# Patient Record
Sex: MALE | Race: BLACK OR AFRICAN AMERICAN | Employment: FULL TIME | URBAN - METROPOLITAN AREA
[De-identification: names, ages, dates, MRNs, and addresses within clinical notes are randomized per-mention and may not be internally consistent; named-entity substitution may affect disease eponyms.]

---

## 2018-09-20 ENCOUNTER — ANESTHESIA EVENT (OUTPATIENT)
Dept: SURGERY | Age: 48
End: 2018-09-20
Payer: COMMERCIAL

## 2018-09-21 ENCOUNTER — ANESTHESIA (OUTPATIENT)
Dept: SURGERY | Age: 48
End: 2018-09-21
Payer: COMMERCIAL

## 2018-09-21 ENCOUNTER — HOSPITAL ENCOUNTER (OUTPATIENT)
Age: 48
Setting detail: OUTPATIENT SURGERY
Discharge: HOME | End: 2018-09-21
Attending: ORTHOPAEDIC SURGERY | Admitting: ORTHOPAEDIC SURGERY
Payer: COMMERCIAL

## 2018-09-21 VITALS
BODY MASS INDEX: 34.59 KG/M2 | SYSTOLIC BLOOD PRESSURE: 119 MMHG | DIASTOLIC BLOOD PRESSURE: 65 MMHG | RESPIRATION RATE: 14 BRPM | OXYGEN SATURATION: 93 % | HEART RATE: 68 BPM | WEIGHT: 248 LBS | TEMPERATURE: 97.6 F

## 2018-09-21 DIAGNOSIS — L76.82 PAIN AT SURGICAL INCISION: Primary | ICD-10-CM

## 2018-09-21 LAB — POTASSIUM BLD-SCNC: 3.4 MMOL/L (ref 3.5–5.1)

## 2018-09-21 PROCEDURE — 77030027384 HC PRB ARTHSCP SERFAS STRY -C: Performed by: ORTHOPAEDIC SURGERY

## 2018-09-21 PROCEDURE — 77030006868 HC BLD SHV AUG STRY -B: Performed by: ORTHOPAEDIC SURGERY

## 2018-09-21 PROCEDURE — 76060000033 HC ANESTHESIA 1 TO 1.5 HR: Performed by: ORTHOPAEDIC SURGERY

## 2018-09-21 PROCEDURE — 77030035253 HC BIT DRL ICONIX DISP STRY -B: Performed by: ORTHOPAEDIC SURGERY

## 2018-09-21 PROCEDURE — 77030019908 HC STETH ESOPH SIMS -A: Performed by: ANESTHESIOLOGY

## 2018-09-21 PROCEDURE — 74011250636 HC RX REV CODE- 250/636: Performed by: ORTHOPAEDIC SURGERY

## 2018-09-21 PROCEDURE — 77030020143 HC AIRWY LARYN INTUB CGAS -A: Performed by: ANESTHESIOLOGY

## 2018-09-21 PROCEDURE — 77030006891 HC BLD SHV RESECT STRY -B: Performed by: ORTHOPAEDIC SURGERY

## 2018-09-21 PROCEDURE — 77030003666 HC NDL SPINAL BD -A: Performed by: ORTHOPAEDIC SURGERY

## 2018-09-21 PROCEDURE — 77030002916 HC SUT ETHLN J&J -A: Performed by: ORTHOPAEDIC SURGERY

## 2018-09-21 PROCEDURE — 74011000250 HC RX REV CODE- 250

## 2018-09-21 PROCEDURE — 76942 ECHO GUIDE FOR BIOPSY: CPT | Performed by: ORTHOPAEDIC SURGERY

## 2018-09-21 PROCEDURE — 77030032490 HC SLV COMPR SCD KNE COVD -B: Performed by: ORTHOPAEDIC SURGERY

## 2018-09-21 PROCEDURE — 74011250636 HC RX REV CODE- 250/636

## 2018-09-21 PROCEDURE — 77030033073 HC TBNG ARTHSC PMP OUTFLO STRY -B: Performed by: ORTHOPAEDIC SURGERY

## 2018-09-21 PROCEDURE — 76010000161 HC OR TIME 1 TO 1.5 HR INTENSV-TIER 1: Performed by: ORTHOPAEDIC SURGERY

## 2018-09-21 PROCEDURE — 77030018836 HC SOL IRR NACL ICUM -A: Performed by: ORTHOPAEDIC SURGERY

## 2018-09-21 PROCEDURE — 76210000006 HC OR PH I REC 0.5 TO 1 HR: Performed by: ORTHOPAEDIC SURGERY

## 2018-09-21 PROCEDURE — 74011250636 HC RX REV CODE- 250/636: Performed by: ANESTHESIOLOGY

## 2018-09-21 PROCEDURE — 76210000020 HC REC RM PH II FIRST 0.5 HR: Performed by: ORTHOPAEDIC SURGERY

## 2018-09-21 PROCEDURE — 77030003602 HC NDL NRV BLK BBMI -B: Performed by: ANESTHESIOLOGY

## 2018-09-21 PROCEDURE — 76010010054 HC POST OP PAIN BLOCK: Performed by: ORTHOPAEDIC SURGERY

## 2018-09-21 PROCEDURE — 84132 ASSAY OF SERUM POTASSIUM: CPT

## 2018-09-21 PROCEDURE — 77030033005 HC TBNG ARTHSC PMP STRY -B: Performed by: ORTHOPAEDIC SURGERY

## 2018-09-21 RX ORDER — EPINEPHRINE 1 MG/ML
INJECTION, SOLUTION, CONCENTRATE INTRAVENOUS AS NEEDED
Status: DISCONTINUED | OUTPATIENT
Start: 2018-09-21 | End: 2018-09-21 | Stop reason: HOSPADM

## 2018-09-21 RX ORDER — MIDAZOLAM HYDROCHLORIDE 1 MG/ML
2 INJECTION, SOLUTION INTRAMUSCULAR; INTRAVENOUS ONCE
Status: COMPLETED | OUTPATIENT
Start: 2018-09-21 | End: 2018-09-21

## 2018-09-21 RX ORDER — GLYCOPYRROLATE 0.2 MG/ML
INJECTION INTRAMUSCULAR; INTRAVENOUS AS NEEDED
Status: DISCONTINUED | OUTPATIENT
Start: 2018-09-21 | End: 2018-09-21 | Stop reason: HOSPADM

## 2018-09-21 RX ORDER — OXYCODONE HYDROCHLORIDE 5 MG/1
5 TABLET ORAL
Status: DISCONTINUED | OUTPATIENT
Start: 2018-09-21 | End: 2018-09-21 | Stop reason: HOSPADM

## 2018-09-21 RX ORDER — FLUMAZENIL 0.1 MG/ML
0.2 INJECTION INTRAVENOUS AS NEEDED
Status: DISCONTINUED | OUTPATIENT
Start: 2018-09-21 | End: 2018-09-21 | Stop reason: HOSPADM

## 2018-09-21 RX ORDER — FENTANYL CITRATE 50 UG/ML
100 INJECTION, SOLUTION INTRAMUSCULAR; INTRAVENOUS ONCE
Status: COMPLETED | OUTPATIENT
Start: 2018-09-21 | End: 2018-09-21

## 2018-09-21 RX ORDER — ONDANSETRON 2 MG/ML
INJECTION INTRAMUSCULAR; INTRAVENOUS AS NEEDED
Status: DISCONTINUED | OUTPATIENT
Start: 2018-09-21 | End: 2018-09-21 | Stop reason: HOSPADM

## 2018-09-21 RX ORDER — MIDAZOLAM HYDROCHLORIDE 1 MG/ML
2 INJECTION, SOLUTION INTRAMUSCULAR; INTRAVENOUS
Status: DISCONTINUED | OUTPATIENT
Start: 2018-09-21 | End: 2018-09-21 | Stop reason: HOSPADM

## 2018-09-21 RX ORDER — OXYCODONE HYDROCHLORIDE 5 MG/1
10 TABLET ORAL
Status: DISCONTINUED | OUTPATIENT
Start: 2018-09-21 | End: 2018-09-21 | Stop reason: HOSPADM

## 2018-09-21 RX ORDER — SODIUM CHLORIDE, SODIUM LACTATE, POTASSIUM CHLORIDE, CALCIUM CHLORIDE 600; 310; 30; 20 MG/100ML; MG/100ML; MG/100ML; MG/100ML
75 INJECTION, SOLUTION INTRAVENOUS CONTINUOUS
Status: DISCONTINUED | OUTPATIENT
Start: 2018-09-21 | End: 2018-09-21 | Stop reason: HOSPADM

## 2018-09-21 RX ORDER — PROPOFOL 10 MG/ML
INJECTION, EMULSION INTRAVENOUS AS NEEDED
Status: DISCONTINUED | OUTPATIENT
Start: 2018-09-21 | End: 2018-09-21 | Stop reason: HOSPADM

## 2018-09-21 RX ORDER — OXYCODONE AND ACETAMINOPHEN 5; 325 MG/1; MG/1
1 TABLET ORAL
Qty: 24 TAB | Refills: 0 | Status: SHIPPED | OUTPATIENT
Start: 2018-09-21 | End: 2019-06-04

## 2018-09-21 RX ORDER — DEXAMETHASONE SODIUM PHOSPHATE 4 MG/ML
INJECTION, SOLUTION INTRA-ARTICULAR; INTRALESIONAL; INTRAMUSCULAR; INTRAVENOUS; SOFT TISSUE AS NEEDED
Status: DISCONTINUED | OUTPATIENT
Start: 2018-09-21 | End: 2018-09-21 | Stop reason: HOSPADM

## 2018-09-21 RX ORDER — HYDROMORPHONE HYDROCHLORIDE 2 MG/ML
0.5 INJECTION, SOLUTION INTRAMUSCULAR; INTRAVENOUS; SUBCUTANEOUS
Status: DISCONTINUED | OUTPATIENT
Start: 2018-09-21 | End: 2018-09-21 | Stop reason: HOSPADM

## 2018-09-21 RX ORDER — DIPHENHYDRAMINE HYDROCHLORIDE 50 MG/ML
12.5 INJECTION, SOLUTION INTRAMUSCULAR; INTRAVENOUS
Status: DISCONTINUED | OUTPATIENT
Start: 2018-09-21 | End: 2018-09-21 | Stop reason: HOSPADM

## 2018-09-21 RX ORDER — NALOXONE HYDROCHLORIDE 0.4 MG/ML
0.1 INJECTION, SOLUTION INTRAMUSCULAR; INTRAVENOUS; SUBCUTANEOUS
Status: DISCONTINUED | OUTPATIENT
Start: 2018-09-21 | End: 2018-09-21 | Stop reason: HOSPADM

## 2018-09-21 RX ORDER — FENTANYL CITRATE 50 UG/ML
INJECTION, SOLUTION INTRAMUSCULAR; INTRAVENOUS AS NEEDED
Status: DISCONTINUED | OUTPATIENT
Start: 2018-09-21 | End: 2018-09-21 | Stop reason: HOSPADM

## 2018-09-21 RX ORDER — LIDOCAINE HYDROCHLORIDE 10 MG/ML
0.1 INJECTION INFILTRATION; PERINEURAL AS NEEDED
Status: DISCONTINUED | OUTPATIENT
Start: 2018-09-21 | End: 2018-09-21 | Stop reason: HOSPADM

## 2018-09-21 RX ORDER — LIDOCAINE HYDROCHLORIDE 20 MG/ML
INJECTION, SOLUTION EPIDURAL; INFILTRATION; INTRACAUDAL; PERINEURAL AS NEEDED
Status: DISCONTINUED | OUTPATIENT
Start: 2018-09-21 | End: 2018-09-21 | Stop reason: HOSPADM

## 2018-09-21 RX ORDER — CEFAZOLIN SODIUM/WATER 2 G/20 ML
2 SYRINGE (ML) INTRAVENOUS
Status: COMPLETED | OUTPATIENT
Start: 2018-09-21 | End: 2018-09-21

## 2018-09-21 RX ADMIN — GLYCOPYRROLATE 0.2 MG: 0.2 INJECTION INTRAMUSCULAR; INTRAVENOUS at 09:06

## 2018-09-21 RX ADMIN — MIDAZOLAM HYDROCHLORIDE 2 MG: 1 INJECTION, SOLUTION INTRAMUSCULAR; INTRAVENOUS at 06:58

## 2018-09-21 RX ADMIN — Medication 2 G: at 08:09

## 2018-09-21 RX ADMIN — DEXAMETHASONE SODIUM PHOSPHATE 4 MG: 4 INJECTION, SOLUTION INTRA-ARTICULAR; INTRALESIONAL; INTRAMUSCULAR; INTRAVENOUS; SOFT TISSUE at 08:43

## 2018-09-21 RX ADMIN — LIDOCAINE HYDROCHLORIDE 60 MG: 20 INJECTION, SOLUTION EPIDURAL; INFILTRATION; INTRACAUDAL; PERINEURAL at 08:13

## 2018-09-21 RX ADMIN — LIDOCAINE HYDROCHLORIDE 20 MG: 20 INJECTION, SOLUTION EPIDURAL; INFILTRATION; INTRACAUDAL; PERINEURAL at 08:58

## 2018-09-21 RX ADMIN — FENTANYL CITRATE 50 MCG: 50 INJECTION INTRAMUSCULAR; INTRAVENOUS at 06:58

## 2018-09-21 RX ADMIN — PROPOFOL 50 MG: 10 INJECTION, EMULSION INTRAVENOUS at 08:23

## 2018-09-21 RX ADMIN — SODIUM CHLORIDE, SODIUM LACTATE, POTASSIUM CHLORIDE, AND CALCIUM CHLORIDE 75 ML/HR: 600; 310; 30; 20 INJECTION, SOLUTION INTRAVENOUS at 07:00

## 2018-09-21 RX ADMIN — PROPOFOL 50 MG: 10 INJECTION, EMULSION INTRAVENOUS at 08:21

## 2018-09-21 RX ADMIN — PROPOFOL 200 MG: 10 INJECTION, EMULSION INTRAVENOUS at 08:13

## 2018-09-21 RX ADMIN — FENTANYL CITRATE 50 MCG: 50 INJECTION, SOLUTION INTRAMUSCULAR; INTRAVENOUS at 08:25

## 2018-09-21 RX ADMIN — ONDANSETRON 4 MG: 2 INJECTION INTRAMUSCULAR; INTRAVENOUS at 08:43

## 2018-09-21 NOTE — ANESTHESIA POSTPROCEDURE EVALUATION
Post-Anesthesia Evaluation and Assessment Patient: Janice Rojo MRN: [id number]  SSN: [id number] YOB: 1970  Age: 53 y.o. Sex: male Cardiovascular Function/Vital Signs Visit Vitals  /62  Pulse 63  Temp 36.4 °C (97.6 °F)  Resp 14  Wt 112.5 kg (248 lb)  SpO2 93%  BMI 34.59 kg/m2 Patient is status post general anesthesia for Procedure(s): LEFT SHOULDER ARTHROSCOPY SUBACROMIAL DECOMPRESSION  / DISTAL CLAVICEL RESECTION /. 
 
Nausea/Vomiting: None Postoperative hydration reviewed and adequate. Pain: 
Pain Scale 1: Numeric (0 - 10) (09/21/18 0911) Pain Intensity 1: 0 (09/21/18 0911) Managed Neurological Status:  
Neuro (WDL): Exceptions to WDL (09/21/18 0911) Neuro Neurologic State: Drowsy (09/21/18 1396) LUE Motor Response: Numbness; Pharmacologically paralyzed (09/21/18 0911) LLE Motor Response: Purposeful (09/21/18 0911) RUE Motor Response: Purposeful (09/21/18 0911) RLE Motor Response: Purposeful (09/21/18 0911) At baseline Mental Status and Level of Consciousness: Arousable Pulmonary Status:  
O2 Device: Nasal cannula (09/21/18 0929) Adequate oxygenation and airway patent Complications related to anesthesia: None Post-anesthesia assessment completed. No concerns Signed By: Anton Arce MD   
 September 21, 2018

## 2018-09-21 NOTE — IP AVS SNAPSHOT
Agustín Shipley 
 
 
 [address] [facility] [address] [facility] 
[phone] Patient: Veronica Raman MRN: [id number] :1970 About your hospitalization You were admitted on:  2018 You last received care in the:  [facility] OP PACU You were discharged on:  2018 Why you were hospitalized Your primary diagnosis was:  Not on File Follow-up Information Follow up With Details Comments Contact Info Kassi Hemphill MD  Keep your follow up appointment as scheduled. [address] [facility], [place] [address] 
[phone] MD Sully Godinez [phone]  Manuel Tavarez 
[phone] Discharge Orders None A check judith indicates which time of day the medication should be taken. My Medications START taking these medications Instructions Each Dose to Equal  
 Morning Noon Evening Bedtime  
 oxyCODONE-acetaminophen 5-325 mg per tablet Commonly known as:  PERCOCET Your last dose was: Your next dose is: Take 1 Tab by mouth every four (4) hours as needed for Pain. Max Daily Amount: 6 Tabs. Indications: Pain 1 Tab CHANGE how you take these medications Instructions Each Dose to Equal  
 Morning Noon Evening Bedtime  
 cyclobenzaprine 10 mg tablet Commonly known as:  FLEXERIL What changed:  additional instructions Your last dose was: Your next dose is: Take 1 Tab by mouth nightly. 10 mg  
    
   
   
   
  
 pravastatin 40 mg tablet Commonly known as:  PRAVACHOL What changed:  when to take this Your last dose was: Your next dose is: Take 1 Tab by mouth nightly. 40 mg CONTINUE taking these medications Instructions Each Dose to Equal  
 Morning Noon Evening Bedtime  
 amLODIPine-benazepril 10-20 mg per capsule Commonly known as:  Trinidad Guerrero Your last dose was: Your next dose is: Take 1 Cap by mouth daily. 1 Cap  
    
   
   
   
  
 chlorthalidone 25 mg tablet Commonly known as:  Mariela Jones Your last dose was: Your next dose is: Take 1 Tab by mouth daily. 25 mg DULoxetine 30 mg capsule Commonly known as:  CYMBALTA Your last dose was: Your next dose is: Take 30 mg by mouth daily. 30 mg  
    
   
   
   
  
 lansoprazole 30 mg capsule Commonly known as:  PREVACID Your last dose was: Your next dose is:    
   
   
 Takes every morning. Indications: gastroesophageal reflux disease  
     
   
   
   
  
 meloxicam 15 mg tablet Commonly known as:  MOBIC Your last dose was: Your next dose is: Take 15 mg by mouth daily. Last dose end of August  
 15 mg VIAGRA PO Your last dose was: Your next dose is: Take  by mouth. Where to Get Your Medications Information on where to get these meds will be given to you by the nurse or doctor. ! Ask your nurse or doctor about these medications  
  oxyCODONE-acetaminophen 5-325 mg per tablet Opioid Education Prescription Opioids: What You Need to Know: 
 
Prescription opioids can be used to help relieve moderate-to-severe pain and are often prescribed following a surgery or injury, or for certain health conditions. These medications can be an important part of treatment but also come with serious risks. Opioids are strong pain medicines. Examples include hydrocodone, oxycodone, fentanyl, and morphine. Heroin is an example of an illegal opioid. It is important to work with your health care provider to make sure you are getting the safest, most effective care. WHAT ARE THE RISKS AND SIDE EFFECTS OF OPIOID USE? Prescription opioids carry serious risks of addiction and overdose, especially with prolonged use. An opioid overdose, often marked by slow breathing, can cause sudden death. The use of prescription opioids can have a number of side effects as well, even when taken as directed. · Tolerance-meaning you might need to take more of a medication for the same pain relief · Physical dependence-meaning you have symptoms of withdrawal when the medication is stopped. Withdrawal symptoms can include nausea, sweating, chills, diarrhea, stomach cramps, and muscle aches. Withdrawal can last up to several weeks, depending on which drug you took and how long you took it. · Increased sensitivity to pain · Constipation · Nausea, vomiting, and dry mouth · Sleepiness and dizziness · Confusion · Depression · Low levels of testosterone that can result in lower sex drive, energy, and strength · Itching and sweating RISKS ARE GREATER WITH:      
· History of drug misuse, substance use disorder, or overdose · Mental health conditions (such as depression or anxiety) · Sleep apnea · Older age (72 years or older) · Pregnancy Avoid alcohol while taking prescription opioids. Also, unless specifically advised by your health care provider, medications to avoid include: · Benzodiazepines (such as Xanax or Valium) · Muscle relaxants (such as Soma or Flexeril) · Hypnotics (such as Ambien or Lunesta) · Other prescription opioids KNOW YOUR OPTIONS Talk to your health care provider about ways to manage your pain that don't involve prescription opioids. Some of these options may actually work better and have fewer risks and side effects. Consult your physician before adding or stopping any medications, treatments, or physical activity. Options may include: 
· Pain relievers such as acetaminophen, ibuprofen, and naproxen · Some medications that are also used for depression or seizures · Physical therapy and exercise · Counseling to help patients learn how to cope better with triggers of pain and stress. · Application of heat or cold compress · Massage therapy · Relaxation techniques Be Informed Make sure you know the name of your medication, how much and how often to take it, and its potential risks & side effects. IF YOU ARE PRESCRIBED OPIOIDS FOR PAIN: 
· Never take opioids in greater amounts or more often than prescribed. Remember the goal is not to be pain-free but to manage your pain at a tolerable level. · Follow up with your primary care provider to: · Work together to create a plan on how to manage your pain. · Talk about ways to help manage your pain that don't involve prescription opioids. · Talk about any and all concerns and side effects. · Help prevent misuse and abuse. · Never sell or share prescription opioids · Help prevent misuse and abuse. · Store prescription opioids in a secure place and out of reach of others (this may include visitors, children, friends, and family). · Safely dispose of unused/unwanted prescription opioids: Find your community drug take-back program or your pharmacy mail-back program, or flush them down the toilet, following guidance from the Food and Drug Administration (www.fda.gov/Drugs/ResourcesForYou). · Visit www.cdc.gov/drugoverdose to learn about the risks of opioid abuse and overdose. · If you believe you may be struggling with addiction, tell your health care provider and ask for guidance or call [address] [place], [place] [address] at [phone]-KXMO. Discharge Instructions Post-Operative Instructions For   Gabby Shape Shoulder Arthroscopy Phone:  [phone] 1. Apply ice to the shoulder as needed. 2.   Keep dressings in place for the first two days.    After two days you may remove the dressings. Keep the stab wounds clean. The small stitches will be removed when you come to the office. You can cover them with small band aids. 3. You may discontinue use of your sling and begin active range of motion of the shoulder as tolerated by pain, unless you are instructed otherwise. 4. You may shower after the dressing comes off. Cover the small wounds with  waterproof band aids. 5. Use  pain medication as instructed on the bottle. If you have pain medication from another physcian do not use it with this medication. 6. You may have some side effects from your pain medication. If you have nausea, try taking your medication with food. For itching, you may take over the counter Benadryl. 7. If you have a problem, please call [address] [place] [address] at [phone] Nitish Jim M.D. [employer] ACTIVITY · As tolerated and as directed by your doctor. · Bathe or shower as directed by your doctor. DIET · Clear liquids until no nausea or vomiting; then light diet for the first day. · Advance to regular diet on second day, unless your doctor orders otherwise. · If nausea and vomiting continues, call your doctor. PAIN 
· Take pain medication as directed by your doctor. · Call your doctor if pain is NOT relieved by medication. · DO NOT take aspirin of blood thinners unless directed by your doctor. DRESSING CARE  
 
 
 
APPOINTMENT DATE/ TIME 
 
YOUR DOCTOR'S PHONE NUMBER  
 
 
 DISCHARGE SUMMARY from Nurse PATIENT INSTRUCTIONS: 
 
After general anesthesia or intravenous sedation, for 24 hours or while taking prescription Narcotics: · Limit your activities · Do not drive and operate hazardous machinery · Do not make important personal or business decisions · Do  not drink alcoholic beverages · If you have not urinated within 8 hours after discharge, please contact your surgeon on call. *  Please give a list of your current medications to your Primary Care Provider. *  Please update this list whenever your medications are discontinued, doses are 
    changed, or new medications (including over-the-counter products) are added. *  Please carry medication information at all times in case of emergency situations. These are general instructions for a healthy lifestyle: No smoking/ No tobacco products/ Avoid exposure to second hand smoke Surgeon General's Warning:  Quitting smoking now greatly reduces serious risk to your health. Obesity, smoking, and sedentary lifestyle greatly increases your risk for illness A healthy diet, regular physical exercise & weight monitoring are important for maintaining a healthy lifestyle You may be retaining fluid if you have a history of heart failure or if you experience any of the following symptoms:  Weight gain of 3 pounds or more overnight or 5 pounds in a week, increased swelling in our hands or feet or shortness of breath while lying flat in bed. Please call your doctor as soon as you notice any of these symptoms; do not wait until your next office visit. Recognize signs and symptoms of STROKE: 
 
F-face looks uneven A-arms unable to move or move unevenly S-speech slurred or non-existent T-time-call 911 as soon as signs and symptoms begin-DO NOT go Back to bed or wait to see if you get better-TIME IS BRAIN. Introducing [facility]! Dear Misha Sanchez: Thank you for requesting a [employer] account. Our records indicate that you already have an active [employer] account. You can access your account anytime at [email or web address] Did you know that you can access your hospital and ER discharge instructions at any time in [employer]? You can also review all of your test results from your hospital stay or ER visit. Additional Information If you have questions, please visit the Frequently Asked Questions section of the [employer] website at [email or web address] Remember, [employer] is NOT to be used for urgent needs. For medical emergencies, dial 911. Now available from your iPhone and Android! Introducing Luis Green As a [employer] patient, I wanted to make you aware of our electronic visit tool called Luis Green. [employer] 24/7 allows you to connect within minutes with a medical provider 24 hours a day, seven days a week via a mobile device or tablet or logging into a secure website from your computer. You can access Luis Jacksonfin from anywhere in the [place]. A virtual visit might be right for you when you have a simple condition and feel like you just dont want to get out of bed, or cant get away from work for an appointment, when your regular [employer] provider is not available (evenings, weekends or holidays), or when youre out of town and need minor care. Electronic visits cost only $49 and if the [employer] 24/7 provider determines a prescription is needed to treat your condition, one can be electronically transmitted to a nearby pharmacy*. Please take a moment to enroll today if you have not already done so. The enrollment process is free and takes just a few minutes. To enroll, please download the [employer] 24/7 avelina to your tablet or phone, or visit [email or web address] to enroll on your computer. And, as an [address] [place] [address] patient with a [employer] account, the results of your visits will be scanned into your electronic medical record and your primary care provider will be able to view the scanned results. We urge you to continue to see your regular Lili Guzman provider for your ongoing medical care. And while your primary care provider may not be the one available when you seek a Luis Talbotsamantafin virtual visit, the peace of mind you get from getting a real diagnosis real time can be priceless. For more information on Luis Jacksonfin, view our Frequently Asked Questions (FAQs) at [email or web address]. Sincerely, 
 
Alee Cardozo MD 
Chief Medical Officer Laura Urbano *:  certain medications cannot be prescribed via Luis Talbotjessica Providers Seen During Your Hospitalization Provider Specialty Primary office phone Jojo Moralez MD Orthopedic Surgery [phone] Your Primary Care Physician (PCP) Primary Care Physician Office Phone Office Fax [address] [facility], [address] [place] [address] [phone] You are allergic to the following No active allergies Recent Documentation Weight BMI Smoking Status 112.5 kg 34.59 kg/m2 Never Smoker Emergency Contacts Name Discharge Info Relation Home Work Mobile Mateo Isaac  Spouse [3] [phone] Patient Belongings The following personal items are in your possession at time of discharge: 
  Dental Appliances: None         Home Medications: None   Jewelry: None  Clothing: Footwear, Pants, Shirt    Other Valuables: None Please provide this summary of care documentation to your next provider. Signatures-by signing, you are acknowledging that this After Visit Summary has been reviewed with you and you have received a copy. Patient Signature:  ____________________________________________________________ Date:  ____________________________________________________________  
  
Josph Perfect Provider Signature:  ____________________________________________________________ Date:  ____________________________________________________________

## 2018-09-21 NOTE — ANESTHESIA PREPROCEDURE EVALUATION
Anesthetic History No history of anesthetic complications Review of Systems / Medical History Patient summary reviewed and pertinent labs reviewed Pulmonary Sleep apnea: CPAP Neuro/Psych Psychiatric history Cardiovascular Hypertension: well controlled Exercise tolerance: >4 METS 
  
GI/Hepatic/Renal 
  
GERD: well controlled Endo/Other Obesity Other Findings Physical Exam 
 
Airway Mallampati: II 
TM Distance: > 6 cm Neck ROM: normal range of motion Mouth opening: Normal 
 
 Cardiovascular Rhythm: regular Rate: normal 
 
 
 
 Dental 
No notable dental hx Pulmonary Breath sounds clear to auscultation Abdominal 
 
 
 
 Other Findings Anesthetic Plan ASA: 2 Anesthesia type: general 
 
 
Post-op pain plan if not by surgeon: peripheral nerve block single Anesthetic plan and risks discussed with: Patient

## 2018-09-21 NOTE — H&P
Outpatient Surgery History and Physical: 
Thang Lutz was seen and examined. CHIEF COMPLAINT:   Left shoulder. PE: 
  
Visit Vitals  /72 (BP 1 Location: Right arm, BP Patient Position: At rest)  Pulse 77  Temp 98.9 °F (37.2 °C)  Resp 16  Wt 112.5 kg (248 lb)  SpO2 98%  BMI 34.59 kg/m2 Heart:   Regular rhythm Lungs:  Are clear Past Medical History:   
Patient Active Problem List  
 Diagnosis  Hypertension, uncontrolled  Hyperlipemia, mixed  Chronic lower back pain  Chronic mid back pain  Depression, major, in remission ([facility] [address]  Erectile dysfunction  Hypertension  
  controlled with medication  GERD (gastroesophageal reflux disease)  
  controlled with medication Surgical History:  
Past Surgical History:  
Procedure Laterality Date  ABDOMEN SURGERY PROC UNLISTED  2012  
 umb hernia Social History: Patient  reports that he has never smoked. He has never used smokeless tobacco. He reports that he does not drink alcohol or use illicit drugs. Family History:  
Family History Problem Relation Age of Onset  Hypertension Mother  Hypertension Father  Hypertension Sister  Malignant Hyperthermia Neg Hx  Pseudocholinesterase Deficiency Neg Hx  Delayed Awakening Neg Hx  Post-op Nausea/Vomiting Neg Hx  Emergence Delirium Neg Hx  Post-op Cognitive Dysfunction Neg Hx   
 Other Neg Hx Allergies: Reviewed per EMR No Known Allergies Medications: No current facility-administered medications on file prior to encounter. Current Outpatient Prescriptions on File Prior to Encounter Medication Sig  
 lansoprazole (PREVACID) 30 mg capsule Takes every morning. Indications: gastroesophageal reflux disease  pravastatin (PRAVACHOL) 40 mg tablet Take 1 Tab by mouth nightly. (Patient taking differently: Take 40 mg by mouth daily.)  chlorthalidone (HYGROTEN) 25 mg tablet Take 1 Tab by mouth daily. (Patient taking differently: Take 25 mg by mouth daily. Indications: hypertension)  cyclobenzaprine (FLEXERIL) 10 mg tablet Take 1 Tab by mouth nightly. (Patient taking differently: Take 10 mg by mouth nightly. Pt takes prn) The surgery is planned for the left shoulder. The patient is here today for outpatient surgery. I have examined the patient, no changes are noted in the patient's medical status. Necessity for the procedure/care is still present and the history and physical above is current. See the office notes for the full long term history of the problem. Please see the recent office notes for the musculoskeletal examination.  
 
Signed By: Brittany Nance MD   
 September 21, 2018 7:49 AM

## 2018-09-21 NOTE — BRIEF OP NOTE
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 
Surgeon(s) and Role: * Alma Maria III, MD - Primary Surgical Assistant:  
 
Surgical Staff: 
Circ-1: Bryon Reese RN Scrub Tech-1: Idalmis Mejia Event Time In Incision Start 6828 Incision Close 0724 Anesthesia: General  
Estimated Blood Loss:  
Specimens: * No specimens in log * Findings:   
Complications:  
Implants: * No implants in log *

## 2018-09-21 NOTE — OP NOTES
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 The arthroscope was inserted through a posterior portal  and the interior of the joint was examined. The labrum was intact. The biceps tendon was normal as well. The articular surfaces were normal. There was not a tear of the rotator cuff. The scope was then removed from the shoulder joint and then placed into the subacromial space. The bursal tissue was removed and visualization was obtained. The anterior acromion was prominent. A subacromial decompression was performed through the posterior portal using a cutting block technique. This provided a good decompression of the subacromial space. The rotator cuff was examined and was normal.          The distal clavicle was very degenerative. A distal clavicle resection was done through the anterior portal. The arthroscopic aron was used to remove approximately 1 centimeter of the bone. The stab wounds were then closed with simple nylon sutures. A sterile dressing was applied. A sling  was placed as well. The patient was then taken to the recovery room in satisfactory condition.           Alberto Wade M.D.

## 2018-09-21 NOTE — ANESTHESIA PROCEDURE NOTES
Peripheral Block Start time: 9/21/2018 6:59 AM 
End time: 9/21/2018 7:03 AM 
Performed by: Mario Maciel Authorized by: Gurdeep Macario: Indications: at surgeon's request and post-op pain management Preanesthetic Checklist: patient identified, risks and benefits discussed, site marked, timeout performed, anesthesia consent given and patient being monitored Timeout Time: 06:58 Block Type:  
Block Type: Interscalene Laterality:  Left Monitoring:  Standard ASA monitoring, responsive to questions, oxygen, continuous pulse ox, frequent vital sign checks and heart rate Injection Technique:  Single shot Procedures: ultrasound guided and nerve stimulator Patient Position: supine Prep: chlorhexidine Location:  Interscalene Needle Type:  Stimuplex Needle Gauge:  22 G Needle Localization:  Nerve stimulator and ultrasound guidance Motor Response: minimal motor response >0.4 mA Medication Injected:  0.375% 
ropivacaine Adds:  Epi 1:200K Volume (mL):  35 
 
Assessment: 
Number of attempts:  1 Injection Assessment:  Incremental injection every 5 mL, negative aspiration for CSF, no paresthesia, ultrasound image on chart, local visualized surrounding nerve on ultrasound, negative aspiration for blood and no intravascular symptoms Patient tolerance:  Patient tolerated the procedure well with no immediate complications

## 2018-10-01 ENCOUNTER — HOSPITAL ENCOUNTER (OUTPATIENT)
Dept: PHYSICAL THERAPY | Age: 48
Discharge: HOME | End: 2018-10-01
Payer: COMMERCIAL

## 2018-10-01 PROCEDURE — 97110 THERAPEUTIC EXERCISES: CPT

## 2018-10-01 PROCEDURE — 97162 PT EVAL MOD COMPLEX 30 MIN: CPT

## 2018-10-01 NOTE — THERAPY EVALUATION
Mick Stinson  : 1970      Payor: Gregoria Smith / Plan: [facility] / Product Type: PPO /    [address] at [address] [facility]. [address] [place] [address], [facility], [address] [place] [address]  [phone]   [phone]              OUTPATIENT PHYSICAL THERAPY:Initial Assessment 10/1/2018  Visit # 1   ICD-10: Treatment Diagnosis: Pain in left shoulder (M25.512)     Bursitis of left shoulder (M75.52)      Stiffness of the left shoulder (M25.512)     Effusion, left shoulder (M25.412)              Precautions/Allergies:   Review of patient's allergies indicates no known allergies. Fall Risk Score: 1 (? 5 = High Risk)  MD Orders: Eval and Treat  MEDICAL/REFERRING DIAGNOSIS:  S/p left shoulder acromioplasty   DATE OF ONSET: 18  REFERRING PHYSICIAN: Jnuior Rosa MD  RETURN PHYSICIAN APPOINTMENT: TBD by patient      INITIAL ASSESSMENT:  Mr. Mick Stinson has attended 1 physical therapy session including initial evaluation. Mick Stinson presents with S/S of s/p acromioplasty, presents with post-op swelling, pain and stiffness. Mick Stinson will benefit from skilled PT (medically necessary) to address above deficits affecting participation in basic ADLs and overall functional tolerance. PROBLEM LIST (Impacting functional limitations):  1. Decreased Strength  2. Decreased ADL/Functional Activities  3. Increased Pain  4. Decreased Activity Tolerance  5. Decreased Flexibility/Joint Mobility  6. Decreased [place] with Home Exercise Program INTERVENTIONS PLANNED:  1. Cryotherapy  2. Family Education  3. Gait Training  4. Home Exercise Program (HEP)  5. Manual Therapy  6. Neuromuscular Re-education/Strengthening  7. Range of Motion (ROM)  8. Therapeutic Activites  9. Therapeutic Exercise/Strengthening     TREATMENT PLAN:  Effective Dates: 10/1/18 TO 10/31/2018 (30 days).   Frequency/Duration: 2 times a week for 30 Days  GOALS: (Goals have been discussed and agreed upon with patient.)  SHORT-TERM FUNCTIONAL GOALS: Time Frame: 4 weeks  1. Sascha Epstein will report <=3/10 pain with don/doffing clothing as well as minimal/no difficulty. 2. Sascha Epstein will demonstrate improvement in active shoulder flexionto >130 degrees to increase UE function and participation in ADLs. 3. Sascha Epstein will demonstrate demonstrate improvement in active shoulder external roation to >70 degrees to increase UE function and participation in ADLs. [address] will show a greater than 8 point decrease on the DASH in order to show an increase in upper extremity function. [address] will be independent in all HEP    DISCHARGE GOALS: Time Frame: 8 weeks    1. Sascha Epstein will show full ROM of the UE in order to return to full functional mobility   2. Sascha Epstein will show a greater than 15 point decrease on the DASH in order to show an increase in upper extremity function  3. Sascha Epstein will report doing hair/bathing without difficulty and <=2/10 pain in order to be independent with ADL's  4. Sascha Epstein will be independent in all advanced HEP     Rehabilitation Potential For Stated Goals: Good  Regarding Melisa Isaac's therapy, I certify that the treatment plan above will be carried out by a therapist or under their direction. Thank you for this referral,  RT Simon     Referring Physician Signature: Rock Irma MD              Date                    HISTORY:   History of Present Injury/Illness (Reason for Referral):  Patient reports chronic left shoulder pain starting in March 2018, no specific injury. According to Mr Faiza Long an MRI showed bones spurs. Acromioplasty performed 10 days ago. He states that he wore a sling for comfort for 5 days. Has not been doing any post -op ex at home yet. Using Motrin, ice and heat for sx control.  Having difficulty sleeping at night wakes up 3-4 times and sleeps for a total of 5 hours. Patient is currently out of work due to post-op status. He also states that he is currently having similar sx in his right shoulder. · Aggravating factors: lifting arm, bathing, dressing   · Relieving factors: rest, ice, heat       Past Medical History/Comorbidities:   Mr. Shellie Hurt  has a past medical history of Borderline high cholesterol; Chronic pain; Depression, major, in remission ([facility] [address] (8/25/2015); GERD (gastroesophageal reflux disease); GERD (gastroesophageal reflux disease); Hypercholesterolemia; Hypertension; Morbid obesity ([address] and Sleep apnea. He also has no past medical history of Aneurysm ([address] Arrhythmia; Arthritis; Asthma; Autoimmune disease ([address] CAD (coronary artery disease); Cancer ([address] Chronic kidney disease; Coagulation defects; COPD; DEMENTIA; Diabetes ([address] Difficult intubation; Endocrine disease; Heart failure ([address] Infectious disease; Liver disease; Malignant hyperthermia due to anesthesia; Nausea & vomiting; Neurological disorder; Other ill-defined conditions(799.89); Pseudocholinesterase deficiency; PUD (peptic ulcer disease); Seizures ([facility] [address] Sleep disorder; Stroke [facility]); Thromboembolus ([address] Thyroid disease; or Unspecified adverse effect of anesthesia. Mr. Shellie Hurt  has a past surgical history that includes pr abdomen surgery proc unlisted (2012). 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 Plans to return to work end of October. Prior Level of Function/Work/Activity:  Full time work    Current Medications:    Current Outpatient Prescriptions:     oxyCODONE-acetaminophen (PERCOCET) 5-325 mg per tablet, Take 1 Tab by mouth every four (4) hours as needed for Pain. Max Daily Amount: 6 Tabs. Indications: Pain, Disp: 24 Tab, Rfl: 0    amLODIPine-benazepril (LOTREL) 10-20 mg per capsule, Take 1 Cap by mouth daily. , Disp: , Rfl:     DULoxetine (CYMBALTA) 30 mg capsule, Take 30 mg by mouth daily. , Disp: , Rfl:     meloxicam (MOBIC) 15 mg tablet, Take 15 mg by mouth daily. Last dose end of August, Disp: , Rfl:     sildenafil citrate (VIAGRA PO), Take  by mouth., Disp: , Rfl:     cyclobenzaprine (FLEXERIL) 10 mg tablet, Take 1 Tab by mouth nightly. (Patient taking differently: Take 10 mg by mouth nightly. Pt takes prn), Disp: 20 Tab, Rfl: 0    lansoprazole (PREVACID) 30 mg capsule, Takes every morning. Indications: gastroesophageal reflux disease, Disp: 90 Cap, Rfl: 1    pravastatin (PRAVACHOL) 40 mg tablet, Take 1 Tab by mouth nightly. (Patient taking differently: Take 40 mg by mouth daily.), Disp: 90 Tab, Rfl: 1    chlorthalidone (HYGROTEN) 25 mg tablet, Take 1 Tab by mouth daily. (Patient taking differently: Take 25 mg by mouth daily.  Indications: hypertension), Disp: 30 Tab, Rfl: 6     Date Last Reviewed:  10/1/2018   Number of Personal Factors/Comorbidities that affect the Plan of Care: 1-2: MODERATE COMPLEXITY   EXAMINATION:   Observation/Orthostatic Postural Assessment:          Rounded shoulders, FHRS  Palpation:          Subacromial space, distal clavicle tenderness  ROM:    AROM/PROM         Joint: Eval Date: 10/2/18 Re-Assess Date:  Re-Assess Date:    ACTIVE ROM (standing) RIGHT LEFT RIGHT LEFT RIGHT LEFT   Shoulder Flexion WNL 60       Shoulder Abduction WNL 40       Shoulder Internal Rotation (Apley's) WNL Mid buttock       Shoulder External Rotation (Apley's) WNL To cheek       Elbow ROM WNL WNL       PASSIVE ROM (supine)         Shoulder Flexion  90       Shoulder Abduction  70       Shoulder Internal Rotation  35       Shoulder External Rotation  40                                      Strength:    Joint: Eval Date: 10/2/18  Re-Assess Date:  Re-Assess Date:     RIGHT LEFT RIGHT LEFT RIGHT LEFT   Shoulder Flexion 5/5 3-/5       Shoulder Abduction  (C5) 5/5 3-/5       Shoulder Internal Rotation 5/5 3-/5       Shoulder External Rotation 5/5 3-/5       Elbow Flexion  (C6) 5/5 4/5       Elbow Extension (C7)         Wrist Flexion (C7)         Wrist Extension (C6)         Resisted Thumb Extension/Finger Abduction (C8/T1)         Resisted Cervical Rotation (C1):         Resisted Shoulder Shrug (C2, 3, 4):  5- 4        Strength                                      Neurological Screen: Assessed @ Initial Visit    Radiating symptoms? no  Functional Mobility:  Assessed @ Initial Visit   Balance: NA     Body Structures Involved:  1. Bones  2. Joints  3. Muscles  4. Ligaments Body Functions Affected:  1. Sensory/Pain  2. Neuromusculoskeletal  3. Movement Related Activities and Participation Affected:  1. Mobility  2. Self Care   Number of elements that affect the Plan of Care: 3: MODERATE COMPLEXITY   CLINICAL PRESENTATION:   Presentation: Evolving clinical presentation with changing clinical characteristics: MODERATE COMPLEXITY   CLINICAL DECISION MAKING:   Outcome Measure: Tool Used: Disabilities of the Arm, Shoulder and Hand (DASH) Questionnaire - Quick Version  Score:  Initial: 41/55  Most Recent: X/55 (Date: -- )   Interpretation of Score: The DASH is designed to measure the activities of daily living in person's with upper extremity dysfunction or pain. Each section is scored on a 1-5 scale, 5 representing the greatest disability. The scores of each section are added together for a total score of 55. Score 11 12-19 20-28 29-37 38-45 46-54 55   Modifier CH CI CJ CK CL CM CN     ? Carrying, Moving, and Handling Objects:     - CURRENT STATUS: CL - 60%-79% impaired, limited or restricted    - GOAL STATUS: CI - 1%-19% impaired, limited or restricted    - D/C STATUS:  ---------------To be determined---------------    Medical Necessity:   · Skilled intervention continues to be required due to deficits and impairments seen upon initial evaluation affecting patient's participation in ADLs and functional tasks.   *  ·   Reason for Services/Other Comments:  · Patient continues to require skilled intervention due to deficits and impairments seen upon initial evaluation affecting patient's participation in ADLs and functional tasks. Use of outcome tool(s) and clinical judgement create a POC that gives a: Questionable prediction of patient's progress: MODERATE COMPLEXITY   TREATMENT:   (In addition to Assessment/Re-Assessment sessions the following treatments were rendered)  THERAPEUTIC EXERCISE: (15 minutes):  Exercises per grid below to improve mobility, strength and balance. Required minimal visual and verbal cues to promote proper body alignment, promote proper body posture and promote proper body mechanics. Progressed resistance, range and repetitions as indicated. Date:  10/1/18 Date:   Date:     Activity/Exercise Parameters Parameters Parameters   pendulum 20 x 2     Table slide 10 x flexion     ER supine with cane 20 x     Shoulder shrugs 10 x     Scapular retraction 10 x     Patient education Treatment rational, expectations, pain control, HEP             Manual Therapy Interventions: (5 minutes): Manual interventions performed to improve joint/soft tissue mobility and ROM to improve ability to perform ADLs. Patient responded well to all manual interventions with no significant increase in pain/symptoms. Improved pain reported below. Technique Used Grade  Level # Time(s) Effect while being performed   occilations 1-2 GHJ 30 x  Pain relief          (Used abbreviations: MET - muscle energy technique; PNF - proprioceptive neuromuscular facilitation; NMR - neuromuscular re-education; AP - anterior to posterior; PA - posterior to anterior)      Treatment/Session Assessment: Patient verbalized and demonstrated understanding of HEP. · Pain/ Symptoms: Initial:   3/10 Post Session:  2/10 ·   Compliance with Program/Exercises: noncompliant some of the time. · Recommendations/Intent for next treatment session:  \"Next visit will focus on advancements to more challenging activities\".     Future Appointments  Date Time Provider Paramjit Oly   10/4/2018 8:15 AM Alanna Tovar, RT [facility]   10/8/2018 8:15 AM Christian[facility] Gore D Papenfuss[facility]   10/11/2018 8:15 AM Christiano Schooling D Papenfuss, [facility]   10/15/2018 8:15 AM Christiano Schooling D Papenfuss, [facility]   10/18/2018 8:15 AM Christiano Schooling D Papenfuss[facility]   10/22/2018 8:15 AM Christiano Schooling D Papenfuss, [facility]   12/4/2018 10:00 AM Viki Rueda MD [facility]       Total Treatment Duration: 15 min therapeutic ex, 30 min evaluation  PT Patient Time In/Time Out  Time In: 0815  Time Out: 0900    Alanna Tovar, PT

## 2018-10-01 NOTE — PROGRESS NOTES
Ambulatory/Rehab Services H2 Model Falls Risk Assessment    Risk Factor Pts. ·   Confusion/Disorientation/Impulsivity  []    4 ·   Symptomatic Depression  []   2 ·   Altered Elimination  []   1 ·   Dizziness/Vertigo  []   1 ·   Gender (Male)  [x]   1 ·   Any administered antiepileptics (anticonvulsants):  []   2 ·   Any administered benzodiazepines:  []   1 ·   Visual Impairment (specify):  []   1 ·   Portable Oxygen Use  []   1 ·   Orthostatic ? BP  []   1 ·   History of Recent Falls (within 3 mos.)  []   5     Ability to Rise from Chair (choose one) Pts. ·   Ability to rise in a single movement  []   0 ·   Pushes up, successful in one attempt  []   1 ·   Multiple attempts, but successful  []   3 ·   Unable to rise without assistance  []   4   Total: (5 or greater = High Risk) 1     Falls Prevention Plan:   []                Physical Limitations to Exercise (specify):   []                Mobility Assistance Device (type):   []                Exercise/Equipment Adaptation (specify):    ©2010 [facility] [address] Patent #[id number].  Federal Law prohibits the replication, distribution or use without written permission from [facility] [employer]

## 2018-10-04 ENCOUNTER — HOSPITAL ENCOUNTER (OUTPATIENT)
Dept: PHYSICAL THERAPY | Age: 48
Discharge: HOME | End: 2018-10-04
Payer: COMMERCIAL

## 2018-10-04 PROCEDURE — 97110 THERAPEUTIC EXERCISES: CPT

## 2018-10-04 PROCEDURE — 97140 MANUAL THERAPY 1/> REGIONS: CPT

## 2018-10-04 NOTE — PROGRESS NOTES
Dhara Adler : 1970 Payor: Essence Wei / Plan: [place] [employer] / Product Type: PPO /  
 [address] at [facility] [address] [facility], [address] [place] [address], [facility], [address] [place] [address] [phone]   [phone] OUTPATIENT PHYSICAL THERAPY:Initial Assessment 10/4/2018  Visit # 2 ICD-10: Treatment Diagnosis: Pain in left shoulder (M25.512) Bursitis of left shoulder (M75.52) Stiffness of the left shoulder (M25.512) Effusion, left shoulder (M25.412) Precautions/Allergies:  
Review of patient's allergies indicates no known allergies. Fall Risk Score: 1 (? 5 = High Risk) MD Orders: Eval and Treat  MEDICAL/REFERRING DIAGNOSIS: 
S/p left shoulder acromioplasty DATE OF ONSET: 18 REFERRING PHYSICIAN: Terence Rosa MD 
RETURN PHYSICIAN APPOINTMENT: TBD by patient INITIAL ASSESSMENT:  Mr. Dhara Adler has attended 1 physical therapy session including initial evaluation. Dhara Adler presents with S/S of s/p acromioplasty, presents with post-op swelling, pain and stiffness. Dhara Adler will benefit from skilled PT (medically necessary) to address above deficits affecting participation in basic ADLs and overall functional tolerance. PROBLEM LIST (Impacting functional limitations): 1. Decreased Strength 2. Decreased ADL/Functional Activities 3. Increased Pain 4. Decreased Activity Tolerance 5. Decreased Flexibility/Joint Mobility 6. Decreased [place] with Home Exercise Program INTERVENTIONS PLANNED: 
1. Cryotherapy 2. Family Education 3. Gait Training 4. Home Exercise Program (HEP) 5. Manual Therapy 6. Neuromuscular Re-education/Strengthening 7. Range of Motion (ROM) 8. Therapeutic Activites 9. Therapeutic Exercise/Strengthening TREATMENT PLAN: 
Effective Dates: 10/1/18 TO 10/31/2018 (30 days). Frequency/Duration: 2 times a week for 30 Days GOALS: (Goals have been discussed and agreed upon with patient.) SHORT-TERM FUNCTIONAL GOALS: Time Frame: 4 weeks 1. Noris Easton will report <=3/10 pain with don/doffing clothing as well as minimal/no difficulty. 2. Noris Easton will demonstrate improvement in active shoulder flexionto >130 degrees to increase UE function and participation in ADLs. 3. Noris Eastno will demonstrate demonstrate improvement in active shoulder external roation to >70 degrees to increase UE function and participation in ADLs. Vianey Narayanan will show a greater than 8 point decrease on the DASH in order to show an increase in upper extremity function. Vianey Narayanan will be independent in all HEP DISCHARGE GOALS: Time Frame: 8 weeks 1. Noris Easton will show full ROM of the UE in order to return to full functional mobility 2. Noris Easton will show a greater than 15 point decrease on the DASH in order to show an increase in upper extremity function 3. Noris Easton will report doing hair/bathing without difficulty and <=2/10 pain in order to be independent with ADL's 
4. Noris Easton will be independent in all advanced HEP Rehabilitation Potential For Stated Goals: Good Regarding Verenice Isaac's therapy, I certify that the treatment plan above will be carried out by a therapist or under their direction. Thank you for this referral, 
RT Darryn Referring Physician Signature: Panfilo Villarreal MD              Date HISTORY:  
History of Present Injury/Illness (Reason for Referral): 
Patient reports chronic left shoulder pain starting in March 2018, no specific injury. According to Mr Fady Peralta an MRI showed bones spurs. Acromioplasty performed 10 days ago. He states that he wore a sling for comfort for 5 days. Has not been doing any post -op ex at home yet.  Using Motrin, ice and heat for sx control. Having difficulty sleeping at night wakes up 3-4 times and sleeps for a total of 5 hours. Patient is currently out of work due to post-op status. He also states that he is currently having similar sx in his right shoulder. · Aggravating factors: lifting arm, bathing, dressing · Relieving factors: rest, ice, heat Past Medical History/Comorbidities:  
Mr. Liliane Luo  has a past medical history of Borderline high cholesterol; Chronic pain; Depression, major, in remission ([address] (8/25/2015); GERD (gastroesophageal reflux disease); GERD (gastroesophageal reflux disease); Hypercholesterolemia; Hypertension; Morbid obesity ([address] and Sleep apnea. He also has no past medical history of Aneurysm ([address] Arrhythmia; Arthritis; Asthma; Autoimmune disease ([address] CAD (coronary artery disease); Cancer ([address] Chronic kidney disease; Coagulation defects; COPD; DEMENTIA; Diabetes ([address] Difficult intubation; Endocrine disease; Heart failure ([address] Infectious disease; Liver disease; Malignant hyperthermia due to anesthesia; Nausea & vomiting; Neurological disorder; Other ill-defined conditions(799.89); Pseudocholinesterase deficiency; PUD (peptic ulcer disease); Seizures ([address] Sleep disorder; Stroke [facility]); Thromboembolus ([address] Thyroid disease; or Unspecified adverse effect of anesthesia. Mr. Liliane Luo  has a past surgical history that includes pr abdomen surgery proc unlisted (2012). Social History/Living Environment:  
  , works for [employer], assembly line and [address]. Plans to return to work end of October. Prior Level of Function/Work/Activity: 
Full time work Current Medications:   
Current Outpatient Prescriptions:  
  oxyCODONE-acetaminophen (PERCOCET) 5-325 mg per tablet, Take 1 Tab by mouth every four (4) hours as needed for Pain. Max Daily Amount: 6 Tabs.  Indications: Pain, Disp: 24 Tab, Rfl: 0 
  amLODIPine-benazepril (LOTREL) 10-20 mg per capsule, Take 1 Cap by mouth daily. , Disp: , Rfl:  
  DULoxetine (CYMBALTA) 30 mg capsule, Take 30 mg by mouth daily. , Disp: , Rfl:  
  meloxicam (MOBIC) 15 mg tablet, Take 15 mg by mouth daily. Last dose end of August, Disp: , Rfl:  
  sildenafil citrate (VIAGRA PO), Take  by mouth., Disp: , Rfl:  
  cyclobenzaprine (FLEXERIL) 10 mg tablet, Take 1 Tab by mouth nightly. (Patient taking differently: Take 10 mg by mouth nightly. Pt takes prn), Disp: 20 Tab, Rfl: 0 
  lansoprazole (PREVACID) 30 mg capsule, Takes every morning. Indications: gastroesophageal reflux disease, Disp: 90 Cap, Rfl: 1   pravastatin (PRAVACHOL) 40 mg tablet, Take 1 Tab by mouth nightly. (Patient taking differently: Take 40 mg by mouth daily.), Disp: 90 Tab, Rfl: 1   chlorthalidone (HYGROTEN) 25 mg tablet, Take 1 Tab by mouth daily. (Patient taking differently: Take 25 mg by mouth daily. Indications: hypertension), Disp: 30 Tab, Rfl: 6 Date Last Reviewed:  10/4/2018 Number of Personal Factors/Comorbidities that affect the Plan of Care: 1-2: MODERATE COMPLEXITY EXAMINATION:  
Observation/Orthostatic Postural Assessment:   
      Rounded shoulders, FHRS Palpation:   
      Subacromial space, distal clavicle tenderness ROM:   
AROM/PROM Joint: Eval Date: 10/2/18 Re-Assess Date:  Re-Assess Date:   
ACTIVE ROM (standing) RIGHT LEFT RIGHT LEFT RIGHT LEFT Shoulder Flexion WNL 60 Shoulder Abduction WNL 40 Shoulder Internal Rotation (Apley's) WNL Mid buttock Shoulder External Rotation (Apley's) WNL To cheek Elbow ROM WNL WNL PASSIVE ROM (supine) Shoulder Flexion  90 Shoulder Abduction  70 Shoulder Internal Rotation  35 Shoulder External Rotation  40 Strength:   
Joint: Eval Date: 10/2/18  Re-Assess Date:  Re-Assess Date:   
 RIGHT LEFT RIGHT LEFT RIGHT LEFT Shoulder Flexion 5/5 3-/5 Shoulder Abduction  (C5) 5/5 3-/5 Shoulder Internal Rotation 5/5 3-/5 Shoulder External Rotation 5/5 3-/5 Elbow Flexion  (C6) 5/5 4/5 Elbow Extension (C7) Wrist Flexion (C7) Wrist Extension (C6) Resisted Thumb Extension/Finger Abduction (C8/T1) Resisted Cervical Rotation (C1):        
Resisted Shoulder Shrug (C2, 3, 4):  5- 4  Strength Neurological Screen: Assessed @ Initial Visit Radiating symptoms? no 
Functional Mobility:  Assessed @ Initial Visit Balance: NA Body Structures Involved: 1. Bones 2. Joints 3. Muscles 4. Ligaments Body Functions Affected: 1. Sensory/Pain 2. Neuromusculoskeletal 
3. Movement Related Activities and Participation Affected: 1. Mobility 2. Self Care Number of elements that affect the Plan of Care: 3: MODERATE COMPLEXITY CLINICAL PRESENTATION:  
Presentation: Evolving clinical presentation with changing clinical characteristics: MODERATE COMPLEXITY CLINICAL DECISION MAKING:  
Outcome Measure: Tool Used: Disabilities of the Arm, Shoulder and Hand (DASH) Questionnaire - Quick Version Score:  Initial: 41/55  Most Recent: X/55 (Date: -- ) Interpretation of Score: The DASH is designed to measure the activities of daily living in person's with upper extremity dysfunction or pain. Each section is scored on a 1-5 scale, 5 representing the greatest disability. The scores of each section are added together for a total score of 55. Score 11 12-19 20-28 29-37 38-45 46-54 55 Modifier CH CI CJ CK CL CM CN ? Carrying, Moving, and Handling Objects:  
  - CURRENT STATUS: CL - 60%-79% impaired, limited or restricted  - GOAL STATUS: CI - 1%-19% impaired, limited or restricted  - D/C STATUS:  ---------------To be determined--------------- Medical Necessity:  
· Skilled intervention continues to be required due to deficits and impairments seen upon initial evaluation affecting patient's participation in ADLs and functional tasks. * 
· Reason for Services/Other Comments: 
· Patient continues to require skilled intervention due to deficits and impairments seen upon initial evaluation affecting patient's participation in ADLs and functional tasks. Use of outcome tool(s) and clinical judgement create a POC that gives a: Questionable prediction of patient's progress: MODERATE COMPLEXITY  
TREATMENT:  
(In addition to Assessment/Re-Assessment sessions the following treatments were rendered) THERAPEUTIC EXERCISE: (15 minutes):  Exercises per grid below to improve mobility, strength and balance. Required minimal visual and verbal cues to promote proper body alignment, promote proper body posture and promote proper body mechanics. Progressed resistance, range and repetitions as indicated. Date: 
10/1/18 Date: 
 Date: Activity/Exercise Parameters Parameters Parameters  
pendulum 20 x 2 Table slide 10 x flexion ER supine with cane 20 x Shoulder shrugs 10 x Scapular retraction 10 x Patient education Treatment rational, expectations, pain control, HEP Manual Therapy Interventions: (5 minutes): Manual interventions performed to improve joint/soft tissue mobility and ROM to improve ability to perform ADLs. Patient responded well to all manual interventions with no significant increase in pain/symptoms. Improved pain reported below. Technique Used Grade  Level # Time(s) Effect while being performed  
occilations 1-2 GHJ 30 x  Pain relief  
      
(Used abbreviations: MET - muscle energy technique; PNF - proprioceptive neuromuscular facilitation; NMR - neuromuscular re-education; AP - anterior to posterior; PA - posterior to anterior) Treatment/Session Assessment: Patient verbalized and demonstrated understanding of HEP. · Pain/ Symptoms: Initial:   3/10 Post Session:  2/10 · Compliance with Program/Exercises: noncompliant some of the time. · Recommendations/Intent for next treatment session: \"Next visit will focus on advancements to more challenging activities\". Future Appointments Date Time Provider Paramjit Aldridge 10/4/2018 8:15 AM Abrahan Liter D [place]  
10/8/2018 8:15 AM Paulene Liter D Papenfuss, [facility]  
10/11/2018 8:15 AM Paulene Liter D Papenfuss, [facility]  
10/15/2018 8:15 AM Paulene Liter D Papenfuss, [facility]  
10/18/2018 8:15 AM Paulene Liter D Papenfuss, [facility]  
10/22/2018 8:15 AM Paulene Liter D Papenfuss, RT [facility]  
12/4/2018 10:00 AM Chaz Han MD [facility] Total Treatment Duration: 15 min therapeutic ex, 30 min evaluation PT Patient Time In/Time Out Time In: [address], PT

## 2018-10-04 NOTE — THERAPY EVALUATION
Jamin Olivo  : 1970      Payor: Emma John / Plan: [employer] / Product Type: PPO /    [facility] at [address] [place] [address] [address] [place] [address], [facility], [address] [place] [address]  [phone]   [phone]              OUTPATIENT PHYSICAL THERAPY:Daily Note 10/4/2018  Visit # 2   ICD-10: Treatment Diagnosis: Pain in left shoulder (M25.512)     Bursitis of left shoulder (M75.52)      Stiffness of the left shoulder (M25.512)     Effusion, left shoulder (M25.412)              Precautions/Allergies:   Review of patient's allergies indicates no known allergies. Fall Risk Score: 1 (? 5 = High Risk)  MD Orders: Eval and Treat  MEDICAL/REFERRING DIAGNOSIS:  S/p left shoulder acromioplasty   DATE OF ONSET: 18  REFERRING PHYSICIAN: Leeanna Rosa MD  RETURN PHYSICIAN APPOINTMENT: TBD by patient      INITIAL ASSESSMENT:  Mr. Jamin Olivo has attended 1 physical therapy session including initial evaluation. Jamin Olivo presents with S/S of s/p acromioplasty, presents with post-op swelling, pain and stiffness. Jamin Olivo will benefit from skilled PT (medically necessary) to address above deficits affecting participation in basic ADLs and overall functional tolerance. PROBLEM LIST (Impacting functional limitations):  1. Decreased Strength  2. Decreased ADL/Functional Activities  3. Increased Pain  4. Decreased Activity Tolerance  5. Decreased Flexibility/Joint Mobility  6. Decreased [place] with Home Exercise Program INTERVENTIONS PLANNED:  1. Cryotherapy  2. Family Education  3. Gait Training  4. Home Exercise Program (HEP)  5. Manual Therapy  6. Neuromuscular Re-education/Strengthening  7. Range of Motion (ROM)  8. Therapeutic Activites  9. Therapeutic Exercise/Strengthening     TREATMENT PLAN:  Effective Dates: 10/1/18 TO 10/31/2018 (30 days).   Frequency/Duration: 2 times a week for 30 Days  GOALS: (Goals have been discussed and agreed upon with patient.)  SHORT-TERM FUNCTIONAL GOALS: Time Frame: 4 weeks  1. Jamin Olivo will report <=3/10 pain with don/doffing clothing as well as minimal/no difficulty. 2. Jamin Olivo will demonstrate improvement in active shoulder flexionto >130 degrees to increase UE function and participation in ADLs. 3. Jamin Olivo will demonstrate demonstrate improvement in active shoulder external roation to >70 degrees to increase UE function and participation in ADLs. [address] will show a greater than 8 point decrease on the DASH in order to show an increase in upper extremity function. [address] will be independent in all HEP    DISCHARGE GOALS: Time Frame: 8 weeks    1. Jamin Olivo will show full ROM of the UE in order to return to full functional mobility   2. Jamin Olivo will show a greater than 15 point decrease on the DASH in order to show an increase in upper extremity function  3. Jamin Olivo will report doing hair/bathing without difficulty and <=2/10 pain in order to be independent with ADL's  4. Jamin Olivo will be independent in all advanced HEP     Rehabilitation Potential For Stated Goals: Good  Regarding Carlitosroberto Tyrone Isaac's therapy, I certify that the treatment plan above will be carried out by a therapist or under their direction. Thank you for this referral,  Anabelle Camarena, RT     Referring Physician Signature: Jessy Brasher MD              Date                    HISTORY:   History of Present Injury/Illness (Reason for Referral):  Patient reports chronic left shoulder pain starting in March 2018, no specific injury. According to Mr Suleman Muir an MRI showed bones spurs. Acromioplasty performed 10 days ago. He states that he wore a sling for comfort for 5 days. Has not been doing any post -op ex at home yet. Using Motrin, ice and heat for sx control.  Having difficulty sleeping at night wakes up 3-4 times and sleeps for a total of 5 hours. Patient is currently out of work due to post-op status. He also states that he is currently having similar sx in his right shoulder. · Aggravating factors: lifting arm, bathing, dressing   · Relieving factors: rest, ice, heat       Past Medical History/Comorbidities:   Mr. Alcira Hernández  has a past medical history of Borderline high cholesterol; Chronic pain; Depression, major, in remission ([facility] [address] (8/25/2015); GERD (gastroesophageal reflux disease); GERD (gastroesophageal reflux disease); Hypercholesterolemia; Hypertension; Morbid obesity ([address] and Sleep apnea. He also has no past medical history of Aneurysm ([address] Arrhythmia; Arthritis; Asthma; Autoimmune disease ([address] CAD (coronary artery disease); Cancer ([address] Chronic kidney disease; Coagulation defects; COPD; DEMENTIA; Diabetes ([address] Difficult intubation; Endocrine disease; Heart failure ([address] Infectious disease; Liver disease; Malignant hyperthermia due to anesthesia; Nausea & vomiting; Neurological disorder; Other ill-defined conditions(799.89); Pseudocholinesterase deficiency; PUD (peptic ulcer disease); Seizures ([facility] [address] Sleep disorder; Stroke [facility]); Thromboembolus ([address] Thyroid disease; or Unspecified adverse effect of anesthesia. Mr. Alcira Hernández  has a past surgical history that includes pr abdomen surgery proc unlisted (2012). 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 Plans to return to work end of October. Prior Level of Function/Work/Activity:  Full time work    Current Medications:    Current Outpatient Prescriptions:     oxyCODONE-acetaminophen (PERCOCET) 5-325 mg per tablet, Take 1 Tab by mouth every four (4) hours as needed for Pain. Max Daily Amount: 6 Tabs. Indications: Pain, Disp: 24 Tab, Rfl: 0    amLODIPine-benazepril (LOTREL) 10-20 mg per capsule, Take 1 Cap by mouth daily. , Disp: , Rfl:     DULoxetine (CYMBALTA) 30 mg capsule, Take 30 mg by mouth daily., Disp: , Rfl:     meloxicam (MOBIC) 15 mg tablet, Take 15 mg by mouth daily. Last dose end of August, Disp: , Rfl:     sildenafil citrate (VIAGRA PO), Take  by mouth., Disp: , Rfl:     cyclobenzaprine (FLEXERIL) 10 mg tablet, Take 1 Tab by mouth nightly. (Patient taking differently: Take 10 mg by mouth nightly. Pt takes prn), Disp: 20 Tab, Rfl: 0    lansoprazole (PREVACID) 30 mg capsule, Takes every morning. Indications: gastroesophageal reflux disease, Disp: 90 Cap, Rfl: 1    pravastatin (PRAVACHOL) 40 mg tablet, Take 1 Tab by mouth nightly. (Patient taking differently: Take 40 mg by mouth daily.), Disp: 90 Tab, Rfl: 1    chlorthalidone (HYGROTEN) 25 mg tablet, Take 1 Tab by mouth daily. (Patient taking differently: Take 25 mg by mouth daily.  Indications: hypertension), Disp: 30 Tab, Rfl: 6     Date Last Reviewed:  10/4/2018   Number of Personal Factors/Comorbidities that affect the Plan of Care: 1-2: MODERATE COMPLEXITY   EXAMINATION:   Observation/Orthostatic Postural Assessment:          Rounded shoulders, FHRS  Palpation:          Subacromial space, distal clavicle tenderness  ROM:    AROM/PROM         Joint: Eval Date: 10/2/18 Re-Assess Date:  Re-Assess Date:    ACTIVE ROM (standing) RIGHT LEFT RIGHT LEFT RIGHT LEFT   Shoulder Flexion WNL 60       Shoulder Abduction WNL 40       Shoulder Internal Rotation (Apley's) WNL Mid buttock       Shoulder External Rotation (Apley's) WNL To cheek       Elbow ROM WNL WNL       PASSIVE ROM (supine)         Shoulder Flexion  90       Shoulder Abduction  70       Shoulder Internal Rotation  35       Shoulder External Rotation  40                                      Strength:    Joint: Eval Date: 10/2/18  Re-Assess Date:  Re-Assess Date:     RIGHT LEFT RIGHT LEFT RIGHT LEFT   Shoulder Flexion 5/5 3-/5       Shoulder Abduction  (C5) 5/5 3-/5       Shoulder Internal Rotation 5/5 3-/5       Shoulder External Rotation 5/5 3-/5       Elbow Flexion  (C6) 5/5 4/5 Elbow Extension (C7)         Wrist Flexion (C7)         Wrist Extension (C6)         Resisted Thumb Extension/Finger Abduction (C8/T1)         Resisted Cervical Rotation (C1):         Resisted Shoulder Shrug (C2, 3, 4):  5- 4        Strength                                      Neurological Screen: Assessed @ Initial Visit    Radiating symptoms? no  Functional Mobility:  Assessed @ Initial Visit   Balance: NA     Body Structures Involved:  1. Bones  2. Joints  3. Muscles  4. Ligaments Body Functions Affected:  1. Sensory/Pain  2. Neuromusculoskeletal  3. Movement Related Activities and Participation Affected:  1. Mobility  2. Self Care   Number of elements that affect the Plan of Care: 3: MODERATE COMPLEXITY   CLINICAL PRESENTATION:   Presentation: Evolving clinical presentation with changing clinical characteristics: MODERATE COMPLEXITY   CLINICAL DECISION MAKING:   Outcome Measure: Tool Used: Disabilities of the Arm, Shoulder and Hand (DASH) Questionnaire - Quick Version  Score:  Initial: 41/55  Most Recent: X/55 (Date: -- )   Interpretation of Score: The DASH is designed to measure the activities of daily living in person's with upper extremity dysfunction or pain. Each section is scored on a 1-5 scale, 5 representing the greatest disability. The scores of each section are added together for a total score of 55. Score 11 12-19 20-28 29-37 38-45 46-54 55   Modifier CH CI CJ CK CL CM CN     ? Carrying, Moving, and Handling Objects:     - CURRENT STATUS: CL - 60%-79% impaired, limited or restricted    - GOAL STATUS: CI - 1%-19% impaired, limited or restricted    - D/C STATUS:  ---------------To be determined---------------    Medical Necessity:   · Skilled intervention continues to be required due to deficits and impairments seen upon initial evaluation affecting patient's participation in ADLs and functional tasks.   *  ·   Reason for Services/Other Comments:  · Patient continues to require skilled intervention due to deficits and impairments seen upon initial evaluation affecting patient's participation in ADLs and functional tasks. TREATMENT:   (In addition to Assessment/Re-Assessment sessions the following treatments were rendered)  THERAPEUTIC EXERCISE: (30 minutes):  Exercises per grid below to improve mobility, strength and balance. Required minimal visual and verbal cues to promote proper body alignment, promote proper body posture and promote proper body mechanics. Progressed resistance, range and repetitions as indicated. Date:  10/1/18 Date:  10/4/18 Date:     Activity/Exercise Parameters Parameters Parameters   pendulum 20 x 2     Table slide 10 x flexion 10 x focus on breathing pattern    ER supine with cane 20 x 30 x    Shoulder shrugs 10 x 2 lbs 2     Scapular retraction 10 x 10 x    Patient education Treatment rational, expectations, pain control, HEP Pain management    Isometric ER with yellow band  5 x 30 sec hold 30 sec relax    Therapy ball rolling standing  5 min    Diaphragmatic breathing  15 x       Manual Therapy Interventions: (15 minutes): Manual interventions performed to improve joint/soft tissue mobility and ROM to improve ability to perform ADLs. Patient responded well to all manual interventions with no significant increase in pain/symptoms. Improved pain reported below. Technique Used Grade  Level # Time(s) Effect while being performed   occilations 1-2 GHJ 30 x 3 Pain relief   Passive physiologic flex/ER   30 x ea Pain relief increase movement   (Used abbreviations: MET - muscle energy technique; PNF - proprioceptive neuromuscular facilitation; NMR - neuromuscular re-education; AP - anterior to posterior; PA - posterior to anterior)        Treatment/Session Assessment: Patient verbalized and demonstrated understanding of HEP.   · Pain/ Symptoms: Initial:   3/10 patient reported difficulty sleeping at night, has been using pillow under his upper arm as instructed. Post Session:  3/10, complaints of \"catching sensation\" with movement ·   Compliance with Program/Exercises: performing as directed. · Recommendations/Intent for next treatment session: \"Next visit will focus on advancements to more challenging activities\".     Future Appointments  Date Time Provider Paramjit Aldridge   10/8/2018 8:15 AM Juana Tovar, RT [facility]   10/11/2018 8:15 AM [facility] D [facility]enfuss[facility]   10/15/2018 8:15 AM Nayla Laughter D Papenfuss[facility]   10/18/2018 8:15 AM Nayla Laughter D Papenfuss, [facility]   10/22/2018 8:15 AM Nay[facility] D Papenfuss[facility]   12/4/2018 10:00 AM Gina Mullen MD [facility]       Total Treatment Duration: 15 min therapeutic ex, 30 min evaluation  PT Patient Time In/Time Out  Time In: 0812  Time Out: 0901    Juana Tovar, PT

## 2018-10-08 ENCOUNTER — HOSPITAL ENCOUNTER (OUTPATIENT)
Dept: PHYSICAL THERAPY | Age: 48
Discharge: HOME | End: 2018-10-08
Payer: COMMERCIAL

## 2018-10-08 PROCEDURE — 97140 MANUAL THERAPY 1/> REGIONS: CPT

## 2018-10-08 PROCEDURE — 97110 THERAPEUTIC EXERCISES: CPT

## 2018-10-08 NOTE — THERAPY EVALUATION
Dhruv Dominguez  : 1970      Payor: Alvaro Sicard / Plan: [facility] [employer] [facility] / Product Type: PPO /    [address] at [address] [facility]. [address] [place] [address], [facility], [address] [place] [address]  [phone]   [phone]              OUTPATIENT PHYSICAL THERAPY:Daily Note 10/8/2018  Visit # 3   ICD-10: Treatment Diagnosis: Pain in left shoulder (M25.512)     Bursitis of left shoulder (M75.52)      Stiffness of the left shoulder (M25.512)     Effusion, left shoulder (M25.412)              Precautions/Allergies:   Review of patient's allergies indicates no known allergies. Fall Risk Score: 1 (? 5 = High Risk)  MD Orders: Eval and Treat  MEDICAL/REFERRING DIAGNOSIS:  S/p left shoulder acromioplasty   DATE OF ONSET: 18  REFERRING PHYSICIAN: Paylor, Emmett Barthel, MD  RETURN PHYSICIAN APPOINTMENT: TBD by patient      INITIAL ASSESSMENT:  Mr. Dhruv Dominguez has attended 1 physical therapy session including initial evaluation. Dhruv Dominguez presents with S/S of s/p acromioplasty, presents with post-op swelling, pain and stiffness. Dhruv Dominguez will benefit from skilled PT (medically necessary) to address above deficits affecting participation in basic ADLs and overall functional tolerance. PROBLEM LIST (Impacting functional limitations):  1. Decreased Strength  2. Decreased ADL/Functional Activities  3. Increased Pain  4. Decreased Activity Tolerance  5. Decreased Flexibility/Joint Mobility  6. Decreased [place] with Home Exercise Program INTERVENTIONS PLANNED:  1. Cryotherapy  2. Family Education  3. Gait Training  4. Home Exercise Program (HEP)  5. Manual Therapy  6. Neuromuscular Re-education/Strengthening  7. Range of Motion (ROM)  8. Therapeutic Activites  9. Therapeutic Exercise/Strengthening     TREATMENT PLAN:  Effective Dates: 10/1/18 TO 10/31/2018 (30 days).   Frequency/Duration: 2 times a week for 30 Days  GOALS: (Goals have been discussed and agreed upon with patient.)  SHORT-TERM FUNCTIONAL GOALS: Time Frame: 4 weeks  1. Mick Stinson will report <=3/10 pain with don/doffing clothing as well as minimal/no difficulty. 2. Mick Stinson will demonstrate improvement in active shoulder flexionto >130 degrees to increase UE function and participation in ADLs. 3. Mick Stinson will demonstrate demonstrate improvement in active shoulder external roation to >70 degrees to increase UE function and participation in ADLs. [address] will show a greater than 8 point decrease on the DASH in order to show an increase in upper extremity function. [address] will be independent in all HEP    DISCHARGE GOALS: Time Frame: 8 weeks    1. Mick Stinson will show full ROM of the UE in order to return to full functional mobility   2. Mick Stinson will show a greater than 15 point decrease on the DASH in order to show an increase in upper extremity function  3. Mick Stinson will report doing hair/bathing without difficulty and <=2/10 pain in order to be independent with ADL's  4. Mick Stinson will be independent in all advanced HEP     Rehabilitation Potential For Stated Goals: Good  Regarding Sabrina Isaac's therapy, I certify that the treatment plan above will be carried out by a therapist or under their direction. Thank you for this referral,  RT Min     Referring Physician Signature: Fidel Orlando MD              Date                    HISTORY:   History of Present Injury/Illness (Reason for Referral):  Patient reports chronic left shoulder pain starting in March 2018, no specific injury. According to Mr Marshall Askew an MRI showed bones spurs. Acromioplasty performed 10 days ago. He states that he wore a sling for comfort for 5 days. Has not been doing any post -op ex at home yet. Using Motrin, ice and heat for sx control.  Having difficulty sleeping at night wakes up 3-4 times and sleeps for a total of 5 hours. Patient is currently out of work due to post-op status. He also states that he is currently having similar sx in his right shoulder. · Aggravating factors: lifting arm, bathing, dressing   · Relieving factors: rest, ice, heat       Past Medical History/Comorbidities:   Mr. Radha Knott  has a past medical history of Borderline high cholesterol; Chronic pain; Depression, major, in remission ([facility] [address] (8/25/2015); GERD (gastroesophageal reflux disease); GERD (gastroesophageal reflux disease); Hypercholesterolemia; Hypertension; Morbid obesity ([address] and Sleep apnea. He also has no past medical history of Aneurysm ([facility] [address] Arrhythmia; Arthritis; Asthma; Autoimmune disease ([facility] [address] CAD (coronary artery disease); Cancer ([address] Chronic kidney disease; Coagulation defects; COPD; DEMENTIA; Diabetes ([facility] [address] Difficult intubation; Endocrine disease; Heart failure ([address] Infectious disease; Liver disease; Malignant hyperthermia due to anesthesia; Nausea & vomiting; Neurological disorder; Other ill-defined conditions(799.89); Pseudocholinesterase deficiency; PUD (peptic ulcer disease); Seizures ([facility] [address] [facility] Sleep disorder; Stroke [facility]); Thromboembolus ([facility] [address] [facility] Thyroid disease; or Unspecified adverse effect of anesthesia. Mr. Radha Knott  has a past surgical history that includes pr abdomen surgery proc unlisted (2012). 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 Plans to return to work end of October. Prior Level of Function/Work/Activity:  Full time work    Current Medications:    Current Outpatient Prescriptions:     oxyCODONE-acetaminophen (PERCOCET) 5-325 mg per tablet, Take 1 Tab by mouth every four (4) hours as needed for Pain. Max Daily Amount: 6 Tabs. Indications: Pain, Disp: 24 Tab, Rfl: 0    amLODIPine-benazepril (LOTREL) 10-20 mg per capsule, Take 1 Cap by mouth daily. , Disp: , Rfl:     DULoxetine (CYMBALTA) 30 mg capsule, Take 30 mg by mouth daily., Disp: , Rfl:     meloxicam (MOBIC) 15 mg tablet, Take 15 mg by mouth daily. Last dose end of August, Disp: , Rfl:     sildenafil citrate (VIAGRA PO), Take  by mouth., Disp: , Rfl:     cyclobenzaprine (FLEXERIL) 10 mg tablet, Take 1 Tab by mouth nightly. (Patient taking differently: Take 10 mg by mouth nightly. Pt takes prn), Disp: 20 Tab, Rfl: 0    lansoprazole (PREVACID) 30 mg capsule, Takes every morning. Indications: gastroesophageal reflux disease, Disp: 90 Cap, Rfl: 1    pravastatin (PRAVACHOL) 40 mg tablet, Take 1 Tab by mouth nightly. (Patient taking differently: Take 40 mg by mouth daily.), Disp: 90 Tab, Rfl: 1    chlorthalidone (HYGROTEN) 25 mg tablet, Take 1 Tab by mouth daily. (Patient taking differently: Take 25 mg by mouth daily.  Indications: hypertension), Disp: 30 Tab, Rfl: 6     Date Last Reviewed:  10/8/2018   Number of Personal Factors/Comorbidities that affect the Plan of Care: 1-2: MODERATE COMPLEXITY   EXAMINATION:   Observation/Orthostatic Postural Assessment:          Rounded shoulders, FHRS  Palpation:          Subacromial space, distal clavicle tenderness  ROM:    AROM/PROM         Joint: Eval Date: 10/2/18 Re-Assess Date:  Re-Assess Date:    ACTIVE ROM (standing) RIGHT LEFT RIGHT LEFT RIGHT LEFT   Shoulder Flexion WNL 60       Shoulder Abduction WNL 40       Shoulder Internal Rotation (Apley's) WNL Mid buttock       Shoulder External Rotation (Apley's) WNL To cheek       Elbow ROM WNL WNL       PASSIVE ROM (supine)         Shoulder Flexion  90       Shoulder Abduction  70       Shoulder Internal Rotation  35       Shoulder External Rotation  40                                      Strength:    Joint: Eval Date: 10/2/18  Re-Assess Date:  Re-Assess Date:     RIGHT LEFT RIGHT LEFT RIGHT LEFT   Shoulder Flexion 5/5 3-/5       Shoulder Abduction  (C5) 5/5 3-/5       Shoulder Internal Rotation 5/5 3-/5       Shoulder External Rotation 5/5 3-/5       Elbow Flexion  (C6) 5/5 4/5 Elbow Extension (C7)         Wrist Flexion (C7)         Wrist Extension (C6)         Resisted Thumb Extension/Finger Abduction (C8/T1)         Resisted Cervical Rotation (C1):         Resisted Shoulder Shrug (C2, 3, 4):  5- 4        Strength                                      Neurological Screen: Assessed @ Initial Visit    Radiating symptoms? no  Functional Mobility:  Assessed @ Initial Visit   Balance: NA     Body Structures Involved:  1. Bones  2. Joints  3. Muscles  4. Ligaments Body Functions Affected:  1. Sensory/Pain  2. Neuromusculoskeletal  3. Movement Related Activities and Participation Affected:  1. Mobility  2. Self Care   Number of elements that affect the Plan of Care: 3: MODERATE COMPLEXITY   CLINICAL PRESENTATION:   Presentation: Evolving clinical presentation with changing clinical characteristics: MODERATE COMPLEXITY   CLINICAL DECISION MAKING:   Outcome Measure: Tool Used: Disabilities of the Arm, Shoulder and Hand (DASH) Questionnaire - Quick Version  Score:  Initial: 41/55  Most Recent: X/55 (Date: -- )   Interpretation of Score: The DASH is designed to measure the activities of daily living in person's with upper extremity dysfunction or pain. Each section is scored on a 1-5 scale, 5 representing the greatest disability. The scores of each section are added together for a total score of 55. Score 11 12-19 20-28 29-37 38-45 46-54 55   Modifier CH CI CJ CK CL CM CN     ? Carrying, Moving, and Handling Objects:     - CURRENT STATUS: CL - 60%-79% impaired, limited or restricted    - GOAL STATUS: CI - 1%-19% impaired, limited or restricted    - D/C STATUS:  ---------------To be determined---------------    Medical Necessity:   · Skilled intervention continues to be required due to deficits and impairments seen upon initial evaluation affecting patient's participation in ADLs and functional tasks.   *  ·   Reason for Services/Other Comments:  · Patient continues to require skilled intervention due to deficits and impairments seen upon initial evaluation affecting patient's participation in ADLs and functional tasks. TREATMENT:   (In addition to Assessment/Re-Assessment sessions the following treatments were rendered)  THERAPEUTIC EXERCISE: (30 minutes):  Exercises per grid below to improve mobility, strength and balance. Required minimal visual and verbal cues to promote proper body alignment, promote proper body posture and promote proper body mechanics. Progressed resistance, range and repetitions as indicated. Date:  10/1/18 Date:  10/4/18 Date:  10/8/18   Activity/Exercise Parameters Parameters Parameters   pendulum 20 x 2     Table slide 10 x flexion 10 x focus on breathing pattern    ER supine with cane 20 x 30 x    Shoulder shrugs 10 x 2 lbs 2  3lbs 2 x 10   Scapular retraction 10 x 10 x 10 x   Patient education Treatment rational, expectations, pain control, HEP Pain management Progression of HEP   Isometric ER with  band  5 x 30 sec hold 30 sec relax 5 x 30 sec hol    Therapy ball rolling standing  5 min    Diaphragmatic breathing  15 x    Wall slide   Assisted by slider and right hand 10 x   Rows    Heavy red 30 lb band 3 x 10   Hip hinge stretch at bars   5 x   Shoulder extension with pole standing   10 x 2   Shoulder IR with strap    6 x      Manual Therapy Interventions: (15 minutes): Manual interventions performed to improve joint/soft tissue mobility and ROM to improve ability to perform ADLs. Patient responded well to all manual interventions with no significant increase in pain/symptoms. Improved pain reported below.        Technique Used Grade  Level # Time(s) Effect while being performed   occilations 2-3 GHJ 30 x 3 Pain relief increase in ROM   Passive physiologic flex/ER   30 x ea Pain relief increase movement   (Used abbreviations: MET - muscle energy technique; PNF - proprioceptive neuromuscular facilitation; NMR - neuromuscular re-education; AP - anterior to posterior; PA - posterior to anterior)        Treatment/Session Assessment: Patient verbalized and demonstrated understanding of HEP. AROM beginning of treatment 125 flexion in standing, passive flexion supine 140. · Pain/ Symptoms: Initial:   3/10 patient reported sleeping through night. Post Session:  2/10, complaints of \"catching sensation\" with movement ·   Compliance with Program/Exercises: performing as directed. · Recommendations/Intent for next treatment session: \"Next visit will focus on advancements to more challenging activities\".     Future Appointments  Date Time Provider Paramjit Aldridge   10/8/2018 8:15 AM Leeanna Tovar, RT [facility]   10/11/2018 8:15 AM La Yordan Tovar, [facility]   10/15/2018 8:15 AM [facility]   10/18/2018 8:15 AM La Yordan Tovar[facility]   10/22/2018 8:15 AM La Yordan Tovar, RT [facility]   12/4/2018 10:00 AM Aniya Raymond MD [facility]       Total Treatment Duration: 15 min therapeutic ex, 30 min evaluation  PT Patient Time In/Time Out  Time In: 0811  Time Out: 0900    Jennifer Tovar, PT

## 2018-10-11 ENCOUNTER — APPOINTMENT (OUTPATIENT)
Dept: PHYSICAL THERAPY | Age: 48
End: 2018-10-11
Payer: COMMERCIAL

## 2018-10-15 ENCOUNTER — HOSPITAL ENCOUNTER (OUTPATIENT)
Dept: PHYSICAL THERAPY | Age: 48
Discharge: HOME | End: 2018-10-15
Payer: COMMERCIAL

## 2018-10-15 PROCEDURE — 97140 MANUAL THERAPY 1/> REGIONS: CPT

## 2018-10-15 PROCEDURE — 97110 THERAPEUTIC EXERCISES: CPT

## 2018-10-15 NOTE — THERAPY EVALUATION
Dhara Adler  : 1970      Payor: Essence Wei / Plan: [employer] 2000 [facility] / Product Type: PPO /    Ana Javed at [facility]. [address] [place] [address] [address] [place] [address], [facility], [address] [place] [address]  [phone]   [phone]              OUTPATIENT PHYSICAL THERAPY:Daily Note 10/15/2018  Visit # 4   ICD-10: Treatment Diagnosis: Pain in left shoulder (M25.512)     Bursitis of left shoulder (M75.52)      Stiffness of the left shoulder (M25.512)     Effusion, left shoulder (M25.412)              Precautions/Allergies:   Review of patient's allergies indicates no known allergies. Fall Risk Score: 1 (? 5 = High Risk)  MD Orders: Eval and Treat  MEDICAL/REFERRING DIAGNOSIS:  S/p left shoulder acromioplasty   DATE OF ONSET: 18  REFERRING PHYSICIAN: Terence Rosa MD  RETURN PHYSICIAN APPOINTMENT: TBD by patient      INITIAL ASSESSMENT:  Mr. Dhara Adler has attended 1 physical therapy session including initial evaluation. Dhara Adler presents with S/S of s/p acromioplasty, presents with post-op swelling, pain and stiffness. Dhara Adler will benefit from skilled PT (medically necessary) to address above deficits affecting participation in basic ADLs and overall functional tolerance. PROBLEM LIST (Impacting functional limitations):  1. Decreased Strength  2. Decreased ADL/Functional Activities  3. Increased Pain  4. Decreased Activity Tolerance  5. Decreased Flexibility/Joint Mobility  6. Decreased Graham with Home Exercise Program INTERVENTIONS PLANNED:  1. Cryotherapy  2. Family Education  3. Gait Training  4. Home Exercise Program (HEP)  5. Manual Therapy  6. Neuromuscular Re-education/Strengthening  7. Range of Motion (ROM)  8. Therapeutic Activites  9. Therapeutic Exercise/Strengthening     TREATMENT PLAN:  Effective Dates: 10/1/18 TO 10/31/2018 (30 days).   Frequency/Duration: 2 times a week for 30 Days  GOALS: (Goals have been discussed and agreed upon with patient.)  SHORT-TERM FUNCTIONAL GOALS: Time Frame: 4 weeks  1. Joselyn Wu will report <=3/10 pain with don/doffing clothing as well as minimal/no difficulty. 2. Joselyn Wu will demonstrate improvement in active shoulder flexionto >130 degrees to increase UE function and participation in ADLs. 3. Joselyn Wu will demonstrate demonstrate improvement in active shoulder external roation to >70 degrees to increase UE function and participation in ADLs. Kattarmäe Oracio will show a greater than 8 point decrease on the DASH in order to show an increase in upper extremity function. Kattarmämandeep 6 will be independent in all HEP    DISCHARGE GOALS: Time Frame: 8 weeks    1. Joselyn Wu will show full ROM of the UE in order to return to full functional mobility   2. Joselyn Wu will show a greater than 15 point decrease on the DASH in order to show an increase in upper extremity function  3. Joselyn Wu will report doing hair/bathing without difficulty and <=2/10 pain in order to be independent with ADL's  4. Joselyn Wu will be independent in all advanced HEP     Rehabilitation Potential For Stated Goals: Good  Regarding Ashley Isaac's therapy, I certify that the treatment plan above will be carried out by a therapist or under their direction. Thank you for this referral,  Faby Shen,      Referring Physician Signature: Letty Lujan MD              Date                    HISTORY:   History of Present Injury/Illness (Reason for Referral):  Patient reports chronic left shoulder pain starting in March 2018, no specific injury. According to Mr Lachelle iLnda an MRI showed bones spurs. Acromioplasty performed 10 days ago. He states that he wore a sling for comfort for 5 days. Has not been doing any post -op ex at home yet. Using Motrin, ice and heat for sx control.  Having difficulty sleeping at night wakes up 3-4 times and sleeps for a total of 5 hours. Patient is currently out of work due to post-op status. He also states that he is currently having similar sx in his right shoulder. · Aggravating factors: lifting arm, bathing, dressing   · Relieving factors: rest, ice, heat       Past Medical History/Comorbidities:   Mr. Christian Vivas  has a past medical history of Borderline high cholesterol; Chronic pain; Depression, major, in remission ([facility] [address] (8/25/2015); GERD (gastroesophageal reflux disease); GERD (gastroesophageal reflux disease); Hypercholesterolemia; Hypertension; Morbid obesity ([address] and Sleep apnea. He also has no past medical history of Aneurysm ([address] Arrhythmia; Arthritis; Asthma; Autoimmune disease ([address] CAD (coronary artery disease); Cancer ([address] Chronic kidney disease; Coagulation defects; COPD; DEMENTIA; Diabetes ([address] Difficult intubation; Endocrine disease; Heart failure ([address] Infectious disease; Liver disease; Malignant hyperthermia due to anesthesia; Nausea & vomiting; Neurological disorder; Other ill-defined conditions(799.89); Pseudocholinesterase deficiency; PUD (peptic ulcer disease); Seizures ([facility] [address] Sleep disorder; Stroke [facility]); Thromboembolus ([address] Thyroid disease; or Unspecified adverse effect of anesthesia. Mr. Christian Vivas  has a past surgical history that includes pr abdomen surgery proc unlisted (2012). Social History/Living Environment:     , works for [employer], assembly line and TRA. Plans to return to work end of October. Prior Level of Function/Work/Activity:  Full time work    Current Medications:    Current Outpatient Prescriptions:     oxyCODONE-acetaminophen (PERCOCET) 5-325 mg per tablet, Take 1 Tab by mouth every four (4) hours as needed for Pain. Max Daily Amount: 6 Tabs. Indications: Pain, Disp: 24 Tab, Rfl: 0    amLODIPine-benazepril (LOTREL) 10-20 mg per capsule, Take 1 Cap by mouth daily. , Disp: , Rfl:     DULoxetine (CYMBALTA) 30 mg capsule, Take 30 mg by mouth daily., Disp: , Rfl:     meloxicam (MOBIC) 15 mg tablet, Take 15 mg by mouth daily. Last dose end of August, Disp: , Rfl:     sildenafil citrate (VIAGRA PO), Take  by mouth., Disp: , Rfl:     cyclobenzaprine (FLEXERIL) 10 mg tablet, Take 1 Tab by mouth nightly. (Patient taking differently: Take 10 mg by mouth nightly. Pt takes prn), Disp: 20 Tab, Rfl: 0    lansoprazole (PREVACID) 30 mg capsule, Takes every morning. Indications: gastroesophageal reflux disease, Disp: 90 Cap, Rfl: 1    pravastatin (PRAVACHOL) 40 mg tablet, Take 1 Tab by mouth nightly. (Patient taking differently: Take 40 mg by mouth daily.), Disp: 90 Tab, Rfl: 1    chlorthalidone (HYGROTEN) 25 mg tablet, Take 1 Tab by mouth daily. (Patient taking differently: Take 25 mg by mouth daily.  Indications: hypertension), Disp: 30 Tab, Rfl: 6     Date Last Reviewed:  10/15/2018   Number of Personal Factors/Comorbidities that affect the Plan of Care: 1-2: MODERATE COMPLEXITY   EXAMINATION:   Observation/Orthostatic Postural Assessment:          Rounded shoulders, FHRS  Palpation:          Subacromial space, distal clavicle tenderness  ROM:    AROM/PROM         Joint: Eval Date: 10/2/18 Re-Assess Date:  Re-Assess Date:    ACTIVE ROM (standing) RIGHT LEFT RIGHT LEFT RIGHT LEFT   Shoulder Flexion WNL 60       Shoulder Abduction WNL 40       Shoulder Internal Rotation (Apley's) WNL Mid buttock       Shoulder External Rotation (Apley's) WNL To cheek       Elbow ROM WNL WNL       PASSIVE ROM (supine)         Shoulder Flexion  90       Shoulder Abduction  70       Shoulder Internal Rotation  35       Shoulder External Rotation  40                                      Strength:    Joint: Eval Date: 10/2/18  Re-Assess Date:  Re-Assess Date:     RIGHT LEFT RIGHT LEFT RIGHT LEFT   Shoulder Flexion 5/5 3-/5       Shoulder Abduction  (C5) 5/5 3-/5       Shoulder Internal Rotation 5/5 3-/5       Shoulder External Rotation 5/5 3-/5       Elbow Flexion  (C6) 5/5 4/5 Elbow Extension (C7)         Wrist Flexion (C7)         Wrist Extension (C6)         Resisted Thumb Extension/Finger Abduction (C8/T1)         Resisted Cervical Rotation (C1):         Resisted Shoulder Shrug (C2, 3, 4):  5- 4        Strength                                      Neurological Screen: Assessed @ Initial Visit    Radiating symptoms? no  Functional Mobility:  Assessed @ Initial Visit   Balance: NA     Body Structures Involved:  1. Bones  2. Joints  3. Muscles  4. Ligaments Body Functions Affected:  1. Sensory/Pain  2. Neuromusculoskeletal  3. Movement Related Activities and Participation Affected:  1. Mobility  2. Self Care   Number of elements that affect the Plan of Care: 3: MODERATE COMPLEXITY   CLINICAL PRESENTATION:   Presentation: Evolving clinical presentation with changing clinical characteristics: MODERATE COMPLEXITY   CLINICAL DECISION MAKING:   Outcome Measure: Tool Used: Disabilities of the Arm, Shoulder and Hand (DASH) Questionnaire - Quick Version  Score:  Initial: 41/55  Most Recent: X/55 (Date: -- )   Interpretation of Score: The DASH is designed to measure the activities of daily living in person's with upper extremity dysfunction or pain. Each section is scored on a 1-5 scale, 5 representing the greatest disability. The scores of each section are added together for a total score of 55. Score 11 12-19 20-28 29-37 38-45 46-54 55   Modifier CH CI CJ CK CL CM CN     ? Carrying, Moving, and Handling Objects:     - CURRENT STATUS: CL - 60%-79% impaired, limited or restricted    - GOAL STATUS: CI - 1%-19% impaired, limited or restricted    - D/C STATUS:  ---------------To be determined---------------    Medical Necessity:   · Skilled intervention continues to be required due to deficits and impairments seen upon initial evaluation affecting patient's participation in ADLs and functional tasks.   *  ·   Reason for Services/Other Comments:  · Patient continues to require skilled intervention due to deficits and impairments seen upon initial evaluation affecting patient's participation in ADLs and functional tasks. TREATMENT:   (In addition to Assessment/Re-Assessment sessions the following treatments were rendered)  THERAPEUTIC EXERCISE: (30 minutes):  Exercises per grid below to improve mobility, strength and balance. Required minimal visual and verbal cues to promote proper body alignment, promote proper body posture and promote proper body mechanics. Progressed resistance, range and repetitions as indicated. Date:  10/1/18 Date:  10/4/18 Date:  10/8/18 Date  10/15/18   Activity/Exercise Parameters Parameters Parameters parameters   pendulum 20 x 2      Table slide 10 x flexion 10 x focus on breathing pattern     ER supine with cane 20 x 30 x     Shoulder shrugs 10 x 2 lbs 2  3lbs 2 x 10 2 x 10 5 lbs   Scapular retraction 10 x 10 x 10 x    Patient education Treatment rational, expectations, pain control, HEP Pain management Progression of HEP Progression of HE    Isometric ER with  band  5 x 30 sec hold 30 sec relax 5 x 30 sec hol  2 x 10  To 45 ER red band   Therapy ball rolling standing  5 min     Diaphragmatic breathing  15 x     Wall slide   Assisted by slider and right hand 10 x 2 x 10 with lift off end range flexion     Rows    Heavy red 30 lb band 3 x 10 Heavy black 50 lb band 2 x 10   Hip hinge stretch at bars   5 x 6 x   Shoulder extension with pole standing   10 x 2 10 x   Shoulder IR with strap    6 x Sleeper stretch   UBE    5 min level 3   Wall pushups    2 x 10    pect minor stretch in standing    10 x hold 3 sec ea      Manual Therapy Interventions: (15 minutes): Manual interventions performed to improve joint/soft tissue mobility and ROM to improve ability to perform ADLs. Patient responded well to all manual interventions with no significant increase in pain/symptoms. Improved pain reported below.        Technique Used Grade  Level # Time(s) Effect while being performed   occilations 2-3 GHJ 30 x 3 Pain relief increase in ROM   Passive physiologic flex/ER   30 x ea Pain relief increase movement   A/P distraction 3 GHJ  Inc movement   (Used abbreviations: MET - muscle energy technique; PNF - proprioceptive neuromuscular facilitation; NMR - neuromuscular re-education; AP - anterior to posterior; PA - posterior to anterior)        Treatment/Session Assessment: Patient verbalized and demonstrated understanding of HEP. AROM beginning of treatment 175 flexion in standing  · Pain/ Symptoms: Initial:   2/10 at worst Post Session:   complaints of \"catching sensation\" with movement ·   Compliance with Program/Exercises: performing as directed. · Recommendations/Intent for next treatment session: \"Next visit will focus on advancements to more challenging activities\".     Future Appointments  Date Time Provider Paramjit Aldridge   10/18/2018 8:15 AM Halina Tovar, RT [facility]   10/22/2018 8:15 AM Emir Tovar,  [facility]   12/4/2018 10:00 AM Nancy Serrato MD [facility]       Total Treatment Duration: 15 min therapeutic ex, 30 min evaluation  PT Patient Time In/Time Out  Time In: 0815  Time Out: 0900    Halina Tovar, PT

## 2018-10-18 ENCOUNTER — HOSPITAL ENCOUNTER (OUTPATIENT)
Dept: PHYSICAL THERAPY | Age: 48
Discharge: HOME | End: 2018-10-18
Payer: COMMERCIAL

## 2018-10-18 PROCEDURE — 97110 THERAPEUTIC EXERCISES: CPT

## 2018-10-18 PROCEDURE — 97140 MANUAL THERAPY 1/> REGIONS: CPT

## 2018-10-18 NOTE — THERAPY EVALUATION
Elisa Guillermo  : 1970      Payor: German Fernandez / Plan: [facility]ly [facility] / Product Type: PPO /    Ritchie Kee at [facility]. [address] [place] [address], [address]  [phone]   [phone]              OUTPATIENT PHYSICAL THERAPY:Daily Note 10/18/2018  Visit # 5   ICD-10: Treatment Diagnosis: Pain in left shoulder (M25.512)     Bursitis of left shoulder (M75.52)      Stiffness of the left shoulder (M25.512)     Effusion, left shoulder (M25.412)              Precautions/Allergies:   Patient has no known allergies. Fall Risk Score: 1 (? 5 = High Risk)  MD Orders: Eval and Treat  MEDICAL/REFERRING DIAGNOSIS:  S/p left shoulder acromioplasty   DATE OF ONSET: 18  REFERRING PHYSICIAN: Susan Rosa MD  RETURN PHYSICIAN APPOINTMENT: TBD by patient      INITIAL ASSESSMENT:  Mr. Elisa Guillermo has attended 1 physical therapy session including initial evaluation. Elisa Guillermo presents with S/S of s/p acromioplasty, presents with post-op swelling, pain and stiffness. Elisa Guillermo will benefit from skilled PT (medically necessary) to address above deficits affecting participation in basic ADLs and overall functional tolerance. PROBLEM LIST (Impacting functional limitations):  1. Decreased Strength  2. Decreased ADL/Functional Activities  3. Increased Pain  4. Decreased Activity Tolerance  5. Decreased Flexibility/Joint Mobility  6. Decreased [place] with Home Exercise Program INTERVENTIONS PLANNED:  1. Cryotherapy  2. Family Education  3. Gait Training  4. Home Exercise Program (HEP)  5. Manual Therapy  6. Neuromuscular Re-education/Strengthening  7. Range of Motion (ROM)  8. Therapeutic Activites  9. Therapeutic Exercise/Strengthening     TREATMENT PLAN:  Effective Dates: 10/1/18 TO 10/31/2018 (30 days).   Frequency/Duration: 2 times a week for 30 Days  GOALS: (Goals have been discussed and agreed upon with patient.)  SHORT-TERM FUNCTIONAL GOALS: Time Frame: 4 weeks  1. Moose Marie will report <=3/10 pain with don/doffing clothing as well as minimal/no difficulty. 2. Moose Marie will demonstrate improvement in active shoulder flexionto >130 degrees to increase UE function and participation in ADLs. 3. Moose Marie will demonstrate demonstrate improvement in active shoulder external roation to >70 degrees to increase UE function and participation in ADLs. [address] will show a greater than 8 point decrease on the DASH in order to show an increase in upper extremity function. [address] will be independent in all HEP    DISCHARGE GOALS: Time Frame: 8 weeks    1. Moose Marie will show full ROM of the UE in order to return to full functional mobility   2. Moose Marie will show a greater than 15 point decrease on the DASH in order to show an increase in upper extremity function  3. Moose Marie will report doing hair/bathing without difficulty and <=2/10 pain in order to be independent with ADL's  4. Moose Marie will be independent in all advanced HEP     Rehabilitation Potential For Stated Goals: Good  Regarding Hanna Isaac's therapy, I certify that the treatment plan above will be carried out by a therapist or under their direction. Thank you for this referral,  Meryle Pain, RT     Referring Physician Signature: Marta Hines MD              Date                    HISTORY:   History of Present Injury/Illness (Reason for Referral):  Patient reports chronic left shoulder pain starting in March 2018, no specific injury. According to Mr Vianca Whatley an MRI showed bones spurs. Acromioplasty performed 10 days ago. He states that he wore a sling for comfort for 5 days. Has not been doing any post -op ex at home yet. Using Motrin, ice and heat for sx control.  Having difficulty sleeping at night wakes up 3-4 times and sleeps for a total of 5 hours. Patient is currently out of work due to post-op status. He also states that he is currently having similar sx in his right shoulder. · Aggravating factors: lifting arm, bathing, dressing   · Relieving factors: rest, ice, heat       Past Medical History/Comorbidities:   Mr. Shirin Rutledge  has a past medical history of Borderline high cholesterol, Chronic pain, Depression, major, in remission ([facility] [address] GERD (gastroesophageal reflux disease), GERD (gastroesophageal reflux disease), Hypercholesterolemia, Hypertension, Morbid obesity ([address] and Sleep apnea. Mr. Shirin Rutledge  has a past surgical history that includes pr abdomen surgery proc unlisted (2012); LEFT SHOULDER ARTHROSCOPY SUBACROMIAL DECOMPRESSION  / DISTAL CLAVICEL RESECTION / (Left, 9/21/2018); and HERNIA VENTRAL REPAIR (N/A, 7/16/2012). 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 Plans to return to work end of October. Prior Level of Function/Work/Activity:  Full time work    Current Medications:    Current Outpatient Medications:     oxyCODONE-acetaminophen (PERCOCET) 5-325 mg per tablet, Take 1 Tab by mouth every four (4) hours as needed for Pain. Max Daily Amount: 6 Tabs. Indications: Pain, Disp: 24 Tab, Rfl: 0    amLODIPine-benazepril (LOTREL) 10-20 mg per capsule, Take 1 Cap by mouth daily. , Disp: , Rfl:     DULoxetine (CYMBALTA) 30 mg capsule, Take 30 mg by mouth daily. , Disp: , Rfl:     meloxicam (MOBIC) 15 mg tablet, Take 15 mg by mouth daily. Last dose end of August, Disp: , Rfl:     sildenafil citrate (VIAGRA PO), Take  by mouth., Disp: , Rfl:     cyclobenzaprine (FLEXERIL) 10 mg tablet, Take 1 Tab by mouth nightly. (Patient taking differently: Take 10 mg by mouth nightly. Pt takes prn), Disp: 20 Tab, Rfl: 0    lansoprazole (PREVACID) 30 mg capsule, Takes every morning.   Indications: gastroesophageal reflux disease, Disp: 90 Cap, Rfl: 1    pravastatin (PRAVACHOL) 40 mg tablet, Take 1 Tab by mouth nightly. (Patient taking differently: Take 40 mg by mouth daily.), Disp: 90 Tab, Rfl: 1    chlorthalidone (HYGROTEN) 25 mg tablet, Take 1 Tab by mouth daily. (Patient taking differently: Take 25 mg by mouth daily. Indications: hypertension), Disp: 30 Tab, Rfl: 6     Date Last Reviewed:  10/18/2018   Number of Personal Factors/Comorbidities that affect the Plan of Care: 1-2: MODERATE COMPLEXITY   EXAMINATION:   Observation/Orthostatic Postural Assessment:          Rounded shoulders, FHRS  Palpation:          Subacromial space, distal clavicle tenderness  ROM:    AROM/PROM         Joint: Eval Date: 10/2/18 Re-Assess Date:  Re-Assess Date:    ACTIVE ROM (standing) RIGHT LEFT RIGHT LEFT RIGHT LEFT   Shoulder Flexion WNL 60       Shoulder Abduction WNL 40       Shoulder Internal Rotation (Apley's) WNL Mid buttock       Shoulder External Rotation (Apley's) WNL To cheek       Elbow ROM WNL WNL       PASSIVE ROM (supine)         Shoulder Flexion  90       Shoulder Abduction  70       Shoulder Internal Rotation  35       Shoulder External Rotation  40                                      Strength:    Joint: Eval Date: 10/2/18  Re-Assess Date:  Re-Assess Date:     RIGHT LEFT RIGHT LEFT RIGHT LEFT   Shoulder Flexion 5/5 3-/5       Shoulder Abduction  (C5) 5/5 3-/5       Shoulder Internal Rotation 5/5 3-/5       Shoulder External Rotation 5/5 3-/5       Elbow Flexion  (C6) 5/5 4/5       Elbow Extension (C7)         Wrist Flexion (C7)         Wrist Extension (C6)         Resisted Thumb Extension/Finger Abduction (C8/T1)         Resisted Cervical Rotation (C1):         Resisted Shoulder Shrug (C2, 3, 4):  5- 4        Strength                                      Neurological Screen: Assessed @ Initial Visit    Radiating symptoms? no  Functional Mobility:  Assessed @ Initial Visit   Balance: NA     Body Structures Involved:  1. Bones  2. Joints  3. Muscles  4.  Ligaments Body Functions Affected:  1. Sensory/Pain  2. Neuromusculoskeletal  3. Movement Related Activities and Participation Affected:  1. Mobility  2. Self Care   Number of elements that affect the Plan of Care: 3: MODERATE COMPLEXITY   CLINICAL PRESENTATION:   Presentation: Evolving clinical presentation with changing clinical characteristics: MODERATE COMPLEXITY   CLINICAL DECISION MAKING:   Outcome Measure: Tool Used: Disabilities of the Arm, Shoulder and Hand (DASH) Questionnaire - Quick Version  Score:  Initial: 41/55  Most Recent: X/55 (Date: -- )   Interpretation of Score: The DASH is designed to measure the activities of daily living in person's with upper extremity dysfunction or pain. Each section is scored on a 1-5 scale, 5 representing the greatest disability. The scores of each section are added together for a total score of 55. Score 11 12-19 20-28 29-37 38-45 46-54 55   Modifier CH CI CJ CK CL CM CN     ? Carrying, Moving, and Handling Objects:     - CURRENT STATUS: CL - 60%-79% impaired, limited or restricted    - GOAL STATUS: CI - 1%-19% impaired, limited or restricted    - D/C STATUS:  ---------------To be determined---------------    Medical Necessity:   · Skilled intervention continues to be required due to deficits and impairments seen upon initial evaluation affecting patient's participation in ADLs and functional tasks. *  ·   Reason for Services/Other Comments:  · Patient continues to require skilled intervention due to deficits and impairments seen upon initial evaluation affecting patient's participation in ADLs and functional tasks. TREATMENT:   (In addition to Assessment/Re-Assessment sessions the following treatments were rendered)  THERAPEUTIC EXERCISE: (30 minutes):  Exercises per grid below to improve mobility, strength and balance. Required minimal visual and verbal cues to promote proper body alignment, promote proper body posture and promote proper body mechanics.   Progressed resistance, range and repetitions as indicated. Date:  10/1/18 Date:  10/4/18 Date:  10/8/18 Date  10/15/18 Date  10/18/18   Activity/Exercise Parameters Parameters Parameters parameters parameters   pendulum 20 x 2       Table slide 10 x flexion 10 x focus on breathing pattern      ER supine with cane 20 x 30 x      Shoulder shrugs 10 x 2 lbs 2  3lbs 2 x 10 2 x 10 5 lbs    Scapular retraction 10 x 10 x 10 x  In prone W position 2 x 10    Patient education Treatment rational, expectations, pain control, HEP Pain management Progression of HEP Progression of HE     Isometric ER with  band  5 x 30 sec hold 30 sec relax 5 x 30 sec hol  2 x 10  To 45 ER red band 2 x 10 ER red band   Therapy ball rolling standing  5 min      Diaphragmatic breathing  15 x      Wall slide   Assisted by slider and right hand 10 x 2 x 10 with lift off end range flexion   2 x 10   Rows    Heavy red 30 lb band 3 x 10 Heavy black 50 lb band 2 x 10 Heavy red 3 x 10   Hip hinge stretch at bars   5 x 6 x 6 x   Shoulder extension with pole standing   10 x 2 10 x 3 x 10 with 5 lb pole   Shoulder IR with strap    6 x Sleeper stretch Standing 6 x    UBE    5 min level 3 6 min level 3.5    Wall pushups    2 x 10     pect minor stretch in standing    10 x hold 3 sec ea    Horizontal abduction     2 x 10 prone    Serratus activation in supine     2 x 10               Manual Therapy Interventions: (15 minutes): Manual interventions performed to improve joint/soft tissue mobility and ROM to improve ability to perform ADLs. Patient responded well to all manual interventions with no significant increase in pain/symptoms. Improved pain reported below.        Technique Used Grade  Level # Time(s) Effect while being performed   occilations 2-3 GHJ 30 x 3 Pain relief increase in ROM   Passive physiologic flex/ER   30 x ea Pain relief increase movement   A/P, distraction, inf glides 3 GHJ  Inc movement   (Used abbreviations: MET - muscle energy technique; PNF - proprioceptive neuromuscular facilitation; NMR - neuromuscular re-education; AP - anterior to posterior; PA - posterior to anterior)        Treatment/Session Assessment: Patient verbalized and demonstrated understanding of HEP. AROM beginning of treatment 175 flexion in standing  · Pain/ Symptoms: Initial:   States that his shoulder has been aching over the last few days. Remains a 2/10 Post Session:   complaints of \"catching sensation\" with movement, but no increase in pain complaints ·   Compliance with Program/Exercises: performing as directed. · Recommendations/Intent for next treatment session: \"Next visit will focus on scapular stabilization, and gradual RC strengthening.     Future Appointments   Date Time Provider Paramjit Aldridge   10/22/2018  8:15 AM Lily Tovar , RT [facility]   12/4/2018 10:00 AM Dean Olson MD [facility]       Total Treatment Duration: 15 min therapeutic ex, 30 min evaluation  PT Patient Time In/Time Out  Time In: 46 Citlalli Mathews, PT

## 2018-10-22 ENCOUNTER — HOSPITAL ENCOUNTER (OUTPATIENT)
Dept: PHYSICAL THERAPY | Age: 48
Discharge: HOME | End: 2018-10-22
Payer: COMMERCIAL

## 2018-10-22 PROCEDURE — 97140 MANUAL THERAPY 1/> REGIONS: CPT

## 2018-10-22 PROCEDURE — 97110 THERAPEUTIC EXERCISES: CPT

## 2018-10-22 NOTE — THERAPY EVALUATION
Jojo Giles  : 1970      Payor: Bhanu Jimenez / Plan: [place] [employer] [facility] / Product Type: PPO /    [address] at [address] [place] [address] [address] [place] [address]  [phone]   [phone]              OUTPATIENT PHYSICAL THERAPY:Daily Note 10/22/2018  Visit # 6   ICD-10: Treatment Diagnosis: Pain in left shoulder (M25.512)     Bursitis of left shoulder (M75.52)      Stiffness of the left shoulder (M25.512)     Effusion, left shoulder (M25.412)              Precautions/Allergies:   Patient has no known allergies. Fall Risk Score: 1 (? 5 = High Risk)  MD Orders: Eval and Treat  MEDICAL/REFERRING DIAGNOSIS:  S/p left shoulder acromioplasty   DATE OF ONSET: 18  REFERRING PHYSICIAN: Tayler Rosa MD  RETURN PHYSICIAN APPOINTMENT: TBD by patient      INITIAL ASSESSMENT:  Mr. Jojo Giles has attended 1 physical therapy session including initial evaluation. Jojo Giles presents with S/S of s/p acromioplasty, presents with post-op swelling, pain and stiffness. Jojo Giles will benefit from skilled PT (medically necessary) to address above deficits affecting participation in basic ADLs and overall functional tolerance. PROBLEM LIST (Impacting functional limitations):  1. Decreased Strength  2. Decreased ADL/Functional Activities  3. Increased Pain  4. Decreased Activity Tolerance  5. Decreased Flexibility/Joint Mobility  6. Decreased [place] with Home Exercise Program INTERVENTIONS PLANNED:  1. Cryotherapy  2. Family Education  3. Gait Training  4. Home Exercise Program (HEP)  5. Manual Therapy  6. Neuromuscular Re-education/Strengthening  7. Range of Motion (ROM)  8. Therapeutic Activites  9. Therapeutic Exercise/Strengthening     TREATMENT PLAN:  Effective Dates: 10/1/18 TO 10/31/2018 (30 days).   Frequency/Duration: 2 times a week for 30 Days  GOALS: (Goals have been discussed and agreed upon with patient.)  SHORT-TERM FUNCTIONAL GOALS: Time Frame: 4 weeks  1. Jose C Holland will report <=3/10 pain with don/doffing clothing as well as minimal/no difficulty. 2. Jose C Holland will demonstrate improvement in active shoulder flexionto >130 degrees to increase UE function and participation in ADLs. 3. Jose C Holland will demonstrate demonstrate improvement in active shoulder external roation to >70 degrees to increase UE function and participation in ADLs. Vianey Narayanan will show a greater than 8 point decrease on the DASH in order to show an increase in upper extremity function. Vianey 6 will be independent in all HEP    DISCHARGE GOALS: Time Frame: 8 weeks    1. Jose C Holland will show full ROM of the UE in order to return to full functional mobility   2. Jose C Holland will show a greater than 15 point decrease on the DASH in order to show an increase in upper extremity function  3. Jose C Holland will report doing hair/bathing without difficulty and <=2/10 pain in order to be independent with ADL's  4. Jose C Holland will be independent in all advanced HEP     Rehabilitation Potential For Stated Goals: Good  Regarding Melissa Isaac's therapy, I certify that the treatment plan above will be carried out by a therapist or under their direction. Thank you for this referral,  RT [place]     Referring Physician Signature: Ally Stewart MD              Date                    HISTORY:   History of Present Injury/Illness (Reason for Referral):  Patient reports chronic left shoulder pain starting in March 2018, no specific injury. According to Mr Theoplis Plenty an MRI showed bones spurs. Acromioplasty performed 10 days ago. He states that he wore a sling for comfort for 5 days. Has not been doing any post -op ex at home yet. Using Motrin, ice and heat for sx control.  Having difficulty sleeping at night wakes up 3-4 times and sleeps for a total of 5 hours. Patient is currently out of work due to post-op status. He also states that he is currently having similar sx in his right shoulder. · Aggravating factors: lifting arm, bathing, dressing   · Relieving factors: rest, ice, heat       Past Medical History/Comorbidities:   Mr. Judith Avila  has a past medical history of Borderline high cholesterol, Chronic pain, Depression, major, in remission ([facility] [address] GERD (gastroesophageal reflux disease), GERD (gastroesophageal reflux disease), Hypercholesterolemia, Hypertension, Morbid obesity ([address] and Sleep apnea. Mr. Judith Avila  has a past surgical history that includes pr abdomen surgery proc unlisted (2012); LEFT SHOULDER ARTHROSCOPY SUBACROMIAL DECOMPRESSION  / DISTAL CLAVICEL RESECTION / (Left, 9/21/2018); and HERNIA VENTRAL REPAIR (N/A, 7/16/2012). 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 Plans to return to work end of October. Prior Level of Function/Work/Activity:  Full time work    Current Medications:    Current Outpatient Medications:     oxyCODONE-acetaminophen (PERCOCET) 5-325 mg per tablet, Take 1 Tab by mouth every four (4) hours as needed for Pain. Max Daily Amount: 6 Tabs. Indications: Pain, Disp: 24 Tab, Rfl: 0    amLODIPine-benazepril (LOTREL) 10-20 mg per capsule, Take 1 Cap by mouth daily. , Disp: , Rfl:     DULoxetine (CYMBALTA) 30 mg capsule, Take 30 mg by mouth daily. , Disp: , Rfl:     meloxicam (MOBIC) 15 mg tablet, Take 15 mg by mouth daily. Last dose end of August, Disp: , Rfl:     sildenafil citrate (VIAGRA PO), Take  by mouth., Disp: , Rfl:     cyclobenzaprine (FLEXERIL) 10 mg tablet, Take 1 Tab by mouth nightly. (Patient taking differently: Take 10 mg by mouth nightly. Pt takes prn), Disp: 20 Tab, Rfl: 0    lansoprazole (PREVACID) 30 mg capsule, Takes every morning.   Indications: gastroesophageal reflux disease, Disp: 90 Cap, Rfl: 1    pravastatin (PRAVACHOL) 40 mg tablet, Take 1 Tab by mouth nightly. (Patient taking differently: Take 40 mg by mouth daily.), Disp: 90 Tab, Rfl: 1    chlorthalidone (HYGROTEN) 25 mg tablet, Take 1 Tab by mouth daily. (Patient taking differently: Take 25 mg by mouth daily. Indications: hypertension), Disp: 30 Tab, Rfl: 6     Date Last Reviewed:  10/22/2018   Number of Personal Factors/Comorbidities that affect the Plan of Care: 1-2: MODERATE COMPLEXITY   EXAMINATION:   Observation/Orthostatic Postural Assessment:          Rounded shoulders, FHRS  Palpation:          Subacromial space, distal clavicle tenderness  ROM:    AROM/PROM         Joint: Eval Date: 10/2/18 Re-Assess Date:  Re-Assess Date:    ACTIVE ROM (standing) RIGHT LEFT RIGHT LEFT RIGHT LEFT   Shoulder Flexion WNL 60       Shoulder Abduction WNL 40       Shoulder Internal Rotation (Apley's) WNL Mid buttock       Shoulder External Rotation (Apley's) WNL To cheek       Elbow ROM WNL WNL       PASSIVE ROM (supine)         Shoulder Flexion  90       Shoulder Abduction  70       Shoulder Internal Rotation  35       Shoulder External Rotation  40                                      Strength:    Joint: Eval Date: 10/2/18  Re-Assess Date:  Re-Assess Date:     RIGHT LEFT RIGHT LEFT RIGHT LEFT   Shoulder Flexion 5/5 3-/5       Shoulder Abduction  (C5) 5/5 3-/5       Shoulder Internal Rotation 5/5 3-/5       Shoulder External Rotation 5/5 3-/5       Elbow Flexion  (C6) 5/5 4/5       Elbow Extension (C7)         Wrist Flexion (C7)         Wrist Extension (C6)         Resisted Thumb Extension/Finger Abduction (C8/T1)         Resisted Cervical Rotation (C1):         Resisted Shoulder Shrug (C2, 3, 4):  5- 4        Strength                                      Neurological Screen: Assessed @ Initial Visit    Radiating symptoms? no  Functional Mobility:  Assessed @ Initial Visit   Balance: NA     Body Structures Involved:  1. Bones  2. Joints  3. Muscles  4.  Ligaments Body Functions Affected:  1. Sensory/Pain  2. Neuromusculoskeletal  3. Movement Related Activities and Participation Affected:  1. Mobility  2. Self Care   Number of elements that affect the Plan of Care: 3: MODERATE COMPLEXITY   CLINICAL PRESENTATION:   Presentation: Evolving clinical presentation with changing clinical characteristics: MODERATE COMPLEXITY   CLINICAL DECISION MAKING:   Outcome Measure: Tool Used: Disabilities of the Arm, Shoulder and Hand (DASH) Questionnaire - Quick Version  Score:  Initial: 41/55  Most Recent: X/55 (Date: -- )   Interpretation of Score: The DASH is designed to measure the activities of daily living in person's with upper extremity dysfunction or pain. Each section is scored on a 1-5 scale, 5 representing the greatest disability. The scores of each section are added together for a total score of 55. Score 11 12-19 20-28 29-37 38-45 46-54 55   Modifier CH CI CJ CK CL CM CN     ? Carrying, Moving, and Handling Objects:     - CURRENT STATUS: CL - 60%-79% impaired, limited or restricted    - GOAL STATUS: CI - 1%-19% impaired, limited or restricted    - D/C STATUS:  ---------------To be determined---------------    Medical Necessity:   · Skilled intervention continues to be required due to deficits and impairments seen upon initial evaluation affecting patient's participation in ADLs and functional tasks. *  ·   Reason for Services/Other Comments:  · Patient continues to require skilled intervention due to deficits and impairments seen upon initial evaluation affecting patient's participation in ADLs and functional tasks. TREATMENT:   (In addition to Assessment/Re-Assessment sessions the following treatments were rendered)  THERAPEUTIC EXERCISE: 45 minutes):  Exercises per grid below to improve mobility, strength and balance. Required minimal visual and verbal cues to promote proper body alignment, promote proper body posture and promote proper body mechanics.   Progressed resistance, range and repetitions as indicated. Date:  10/1/18 Date:  10/4/18 Date:  10/8/18 Date  10/15/18 Date  10/18/18 Date  10/22/18   Activity/Exercise Parameters Parameters Parameters parameters parameters parameters   pendulum 20 x 2        Table slide 10 x flexion 10 x focus on breathing pattern       ER supine with cane 20 x 30 x       Shoulder shrugs 10 x 2 lbs 2  3lbs 2 x 10 2 x 10 5 lbs  3 x 10 10 lbs   Scapular retraction 10 x 10 x 10 x  In prone W position 2 x 10  W 2 x 10   Patient education Treatment rational, expectations, pain control, HEP Pain management Progression of HEP Progression of HE      Isometric ER with  band  5 x 30 sec hold 30 sec relax 5 x 30 sec hol  2 x 10  To 45 ER red band 2 x 10 ER red band Con/eccentric heavy red band 3 x 5   Therapy ball rolling standing  5 min       Diaphragmatic breathing  15 x       Wall slide   Assisted by slider and right hand 10 x 2 x 10 with lift off end range flexion   2 x 10 With lift off 2 x 10    Rows    Heavy red 30 lb band 3 x 10 Heavy black 50 lb band 2 x 10 Heavy red 3 x 10    Hip hinge stretch at bars   5 x 6 x 6 x 6 x   Shoulder extension with pole standing   10 x 2 10 x 3 x 10 with 5 lb pole 2 x 10   Shoulder IR with strap    6 x Sleeper stretch Standing 6 x  6 x   UBE    5 min level 3 6 min level 3.5  6 min level 4   Wall pushups    2 x 10      pect minor stretch in standing    10 x hold 3 sec ea     Horizontal abduction     2 x 10 prone  2 x 10   Serratus activation in supine     2 x 10     Ball bounce on wall at 90/90       5 x 30 sec   High rows       2 x 10 with 17 lbs on cables   Quadriped push back into ball on wall       5 sec hold x 10       Manual Therapy Interventions: (15 minutes): Manual interventions performed to improve joint/soft tissue mobility and ROM to improve ability to perform ADLs. Patient responded well to all manual interventions with no significant increase in pain/symptoms. Improved pain reported below. Technique Used Grade  Level # Time(s) Effect while being performed   occilations 2-3 GHJ 30 x 3 Pain relief increase in ROM   Passive physiologic flex/ER   30 x ea Pain relief increase movement   A/P, distraction, inf glides 3 GHJ  Inc movement   (Used abbreviations: MET - muscle energy technique; PNF - proprioceptive neuromuscular facilitation; NMR - neuromuscular re-education; AP - anterior to posterior; PA - posterior to anterior)        Treatment/Session Assessment: Patient verbalized and demonstrated understanding of HEP. AROM beginning of treatment 175 flexion in standing, strength flexion 4/5, ER 4+,abduction 3+/5  · Pain/ Symptoms: Initial:   States that he is seeing his MD on 10/24/18 wants to continue therapy. States that his job is quite physical and he does not think he is ready to return. Main complaints are weakness and stiffness in the morning. ·   Compliance with Program/Exercises: performing as directed. · Recommendations/Intent for next treatment session: \"Next visit will focus on scapular stabilization, and gradual RC strengthening.     Future Appointments   Date Time Provider Paramjit [place]   10/25/2018  8:00 AM Juana Tovar, RT [facility]   10/29/2018  8:15 AM Juana Tovar, RT [facility]   11/1/2018  9:00 AM Juana Tovar, RT [facility]   11/5/2018  8:15 AM Juana Tovar, RT [facility]   11/8/2018  8:15 AM Juana Tovar, RT [facility]   12/4/2018 10:00 AM Larisa Olson MD [facility]       Total Treatment Duration: 15 min therapeutic ex, 30 min evaluation  PT Patient Time In/Time Out  Time In: 0820    Jennifer Tovar PT

## 2018-10-25 ENCOUNTER — HOSPITAL ENCOUNTER (OUTPATIENT)
Dept: PHYSICAL THERAPY | Age: 48
Discharge: HOME | End: 2018-10-25
Payer: COMMERCIAL

## 2018-10-25 PROCEDURE — 97110 THERAPEUTIC EXERCISES: CPT

## 2018-10-25 NOTE — THERAPY EVALUATION
Marcos Veliz  : 1970      Payor: Kirk Trinidad / Plan: [facility] / Product Type: PPO /    [facility] at [address] [place] [address] [address] [place] [address], [address] [place] [address]  [phone]   [phone]              OUTPATIENT PHYSICAL THERAPY:Daily Note 10/25/2018  Visit # 7   ICD-10: Treatment Diagnosis: Pain in left shoulder (M25.512)     Bursitis of left shoulder (M75.52)      Stiffness of the left shoulder (M25.512)     Effusion, left shoulder (M25.412)              Precautions/Allergies:   Patient has no known allergies. Fall Risk Score: 1 (? 5 = High Risk)  MD Orders: Eval and Treat  MEDICAL/REFERRING DIAGNOSIS:  S/p left shoulder acromioplasty   DATE OF ONSET: 18  REFERRING PHYSICIAN: Man Rosa MD  RETURN PHYSICIAN APPOINTMENT: TBD by patient      INITIAL ASSESSMENT:  Mr. Marcos Veliz has attended 1 physical therapy session including initial evaluation. Marcos Veliz presents with S/S of s/p acromioplasty, presents with post-op swelling, pain and stiffness. Marcos Veliz will benefit from skilled PT (medically necessary) to address above deficits affecting participation in basic ADLs and overall functional tolerance. PROBLEM LIST (Impacting functional limitations):  1. Decreased Strength  2. Decreased ADL/Functional Activities  3. Increased Pain  4. Decreased Activity Tolerance  5. Decreased Flexibility/Joint Mobility  6. Decreased Centre with Home Exercise Program INTERVENTIONS PLANNED:  1. Cryotherapy  2. Family Education  3. Gait Training  4. Home Exercise Program (HEP)  5. Manual Therapy  6. Neuromuscular Re-education/Strengthening  7. Range of Motion (ROM)  8. Therapeutic Activites  9. Therapeutic Exercise/Strengthening     TREATMENT PLAN:  Effective Dates: 10/1/18 TO 10/31/2018 (30 days).   Frequency/Duration: 2 times a week for 30 Days  GOALS: (Goals have been discussed and agreed upon with patient.)  SHORT-TERM FUNCTIONAL GOALS: Time Frame: 4 weeks  1. Dhara Adler will report <=3/10 pain with don/doffing clothing as well as minimal/no difficulty. 2. Dhara Adler will demonstrate improvement in active shoulder flexionto >130 degrees to increase UE function and participation in ADLs. 3. Dhara Adler will demonstrate demonstrate improvement in active shoulder external roation to >70 degrees to increase UE function and participation in ADLs. Kattarmämandeep Narayanan will show a greater than 8 point decrease on the DASH in order to show an increase in upper extremity function. Vianey 6 will be independent in all HEP    DISCHARGE GOALS: Time Frame: 8 weeks    1. Dhara Adler will show full ROM of the UE in order to return to full functional mobility   2. Dhara Adler will show a greater than 15 point decrease on the DASH in order to show an increase in upper extremity function  3. Dhara Adler will report doing hair/bathing without difficulty and <=2/10 pain in order to be independent with ADL's  4. Dhara Adler will be independent in all advanced HEP     Rehabilitation Potential For Stated Goals: Good  Regarding Olga Isaac's therapy, I certify that the treatment plan above will be carried out by a therapist or under their direction. Thank you for this referral,  RT Ella     Referring Physician Signature: Versie Denver, MD              Date                    HISTORY:   History of Present Injury/Illness (Reason for Referral):  Patient reports chronic left shoulder pain starting in March 2018, no specific injury. According to Mr Shirin Rutledge an MRI showed bones spurs. Acromioplasty performed 10 days ago. He states that he wore a sling for comfort for 5 days. Has not been doing any post -op ex at home yet. Using Motrin, ice and heat for sx control.  Having difficulty sleeping at night wakes up 3-4 times and sleeps for a total of 5 hours. Patient is currently out of work due to post-op status. He also states that he is currently having similar sx in his right shoulder. · Aggravating factors: lifting arm, bathing, dressing   · Relieving factors: rest, ice, heat       Past Medical History/Comorbidities:   Mr. Robin Ellington  has a past medical history of Borderline high cholesterol, Chronic pain, Depression, major, in remission ([facility] [address] GERD (gastroesophageal reflux disease), GERD (gastroesophageal reflux disease), Hypercholesterolemia, Hypertension, Morbid obesity ([address] and Sleep apnea. Mr. Robin Ellington  has a past surgical history that includes pr abdomen surgery proc unlisted (2012); LEFT SHOULDER ARTHROSCOPY SUBACROMIAL DECOMPRESSION  / DISTAL CLAVICEL RESECTION / (Left, 9/21/2018); and HERNIA VENTRAL REPAIR (N/A, 7/16/2012). 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 Plans to return to work end of October. Prior Level of Function/Work/Activity:  Full time work    Current Medications:    Current Outpatient Medications:     oxyCODONE-acetaminophen (PERCOCET) 5-325 mg per tablet, Take 1 Tab by mouth every four (4) hours as needed for Pain. Max Daily Amount: 6 Tabs. Indications: Pain, Disp: 24 Tab, Rfl: 0    amLODIPine-benazepril (LOTREL) 10-20 mg per capsule, Take 1 Cap by mouth daily. , Disp: , Rfl:     DULoxetine (CYMBALTA) 30 mg capsule, Take 30 mg by mouth daily. , Disp: , Rfl:     meloxicam (MOBIC) 15 mg tablet, Take 15 mg by mouth daily. Last dose end of August, Disp: , Rfl:     sildenafil citrate (VIAGRA PO), Take  by mouth., Disp: , Rfl:     cyclobenzaprine (FLEXERIL) 10 mg tablet, Take 1 Tab by mouth nightly. (Patient taking differently: Take 10 mg by mouth nightly. Pt takes prn), Disp: 20 Tab, Rfl: 0    lansoprazole (PREVACID) 30 mg capsule, Takes every morning.   Indications: gastroesophageal reflux disease, Disp: 90 Cap, Rfl: 1    pravastatin (PRAVACHOL) 40 mg tablet, Take 1 Tab by mouth nightly. (Patient taking differently: Take 40 mg by mouth daily.), Disp: 90 Tab, Rfl: 1    chlorthalidone (HYGROTEN) 25 mg tablet, Take 1 Tab by mouth daily. (Patient taking differently: Take 25 mg by mouth daily. Indications: hypertension), Disp: 30 Tab, Rfl: 6     Date Last Reviewed:  10/25/2018   Number of Personal Factors/Comorbidities that affect the Plan of Care: 1-2: MODERATE COMPLEXITY   EXAMINATION:   Observation/Orthostatic Postural Assessment:          Rounded shoulders, FHRS  Palpation:          Subacromial space, distal clavicle tenderness  ROM:    AROM/PROM         Joint: Eval Date: 10/2/18 Re-Assess Date:  Re-Assess Date:    ACTIVE ROM (standing) RIGHT LEFT RIGHT LEFT RIGHT LEFT   Shoulder Flexion WNL 60       Shoulder Abduction WNL 40       Shoulder Internal Rotation (Apley's) WNL Mid buttock       Shoulder External Rotation (Apley's) WNL To cheek       Elbow ROM WNL WNL       PASSIVE ROM (supine)         Shoulder Flexion  90       Shoulder Abduction  70       Shoulder Internal Rotation  35       Shoulder External Rotation  40                                      Strength:    Joint: Eval Date: 10/2/18  Re-Assess Date:  Re-Assess Date:     RIGHT LEFT RIGHT LEFT RIGHT LEFT   Shoulder Flexion 5/5 3-/5       Shoulder Abduction  (C5) 5/5 3-/5       Shoulder Internal Rotation 5/5 3-/5       Shoulder External Rotation 5/5 3-/5       Elbow Flexion  (C6) 5/5 4/5       Elbow Extension (C7)         Wrist Flexion (C7)         Wrist Extension (C6)         Resisted Thumb Extension/Finger Abduction (C8/T1)         Resisted Cervical Rotation (C1):         Resisted Shoulder Shrug (C2, 3, 4):  5- 4        Strength                                      Neurological Screen: Assessed @ Initial Visit    Radiating symptoms? no  Functional Mobility:  Assessed @ Initial Visit   Balance: NA     Body Structures Involved:  1. Bones  2. Joints  3. Muscles  4.  Ligaments Body Functions Affected:  1. Sensory/Pain  2. Neuromusculoskeletal  3. Movement Related Activities and Participation Affected:  1. Mobility  2. Self Care   Number of elements that affect the Plan of Care: 3: MODERATE COMPLEXITY   CLINICAL PRESENTATION:   Presentation: Evolving clinical presentation with changing clinical characteristics: MODERATE COMPLEXITY   CLINICAL DECISION MAKING:   Outcome Measure: Tool Used: Disabilities of the Arm, Shoulder and Hand (DASH) Questionnaire - Quick Version  Score:  Initial: 41/55  Most Recent: X/55 (Date: -- )   Interpretation of Score: The DASH is designed to measure the activities of daily living in person's with upper extremity dysfunction or pain. Each section is scored on a 1-5 scale, 5 representing the greatest disability. The scores of each section are added together for a total score of 55. Score 11 12-19 20-28 29-37 38-45 46-54 55   Modifier CH CI CJ CK CL CM CN     ? Carrying, Moving, and Handling Objects:     - CURRENT STATUS: CL - 60%-79% impaired, limited or restricted    - GOAL STATUS: CI - 1%-19% impaired, limited or restricted    - D/C STATUS:  ---------------To be determined---------------    Medical Necessity:   · Skilled intervention continues to be required due to deficits and impairments seen upon initial evaluation affecting patient's participation in ADLs and functional tasks. *  ·   Reason for Services/Other Comments:  · Patient continues to require skilled intervention due to deficits and impairments seen upon initial evaluation affecting patient's participation in ADLs and functional tasks. TREATMENT:   (In addition to Assessment/Re-Assessment sessions the following treatments were rendered)  THERAPEUTIC EXERCISE: 60 minutes):  Exercises per grid below to improve mobility, strength and balance. Required minimal visual and verbal cues to promote proper body alignment, promote proper body posture and promote proper body mechanics.   Progressed resistance, range and repetitions as indicated.      Date:  10/1/18 Date:  10/4/18 Date:  10/8/18 Date  10/15/18 Date  10/18/18 Date  10/22/18 Date  10/25/18   Activity/Exercise Parameters Parameters Parameters parameters parameters parameters parameters   pendulum 20 x 2         Table slide 10 x flexion 10 x focus on breathing pattern        ER supine with cane 20 x 30 x        Shoulder shrugs 10 x 2 lbs 2  3lbs 2 x 10 2 x 10 5 lbs  3 x 10 10 lbs 3 x 10 10 lbs   Scapular retraction 10 x 10 x 10 x  In prone W position 2 x 10  W 2 x 10 Prone Ws 2 x 10 with 2 lbs   Patient education Treatment rational, expectations, pain control, HEP Pain management Progression of HEP Progression of HE       Isometric ER with  band  5 x 30 sec hold 30 sec relax 5 x 30 sec hol  2 x 10  To 45 ER red band 2 x 10 ER red band Con/eccentric heavy red band 3 x 5    Therapy ball rolling standing  5 min        Diaphragmatic breathing  15 x        Wall slide   Assisted by slider and right hand 10 x 2 x 10 with lift off end range flexion   2 x 10 With lift off 2 x 10  2 x 10 with lift off   Rows    Heavy red 30 lb band 3 x 10 Heavy black 50 lb band 2 x 10 Heavy red 3 x 10     Hip hinge stretch at bars   5 x 6 x 6 x 6 x 6 x   Shoulder extension with pole standing   10 x 2 10 x 3 x 10 with 5 lb pole 2 x 10    Shoulder IR with strap    6 x Sleeper stretch Standing 6 x  6 x 6 x   UBE    5 min level 3 6 min level 3.5  6 min level 4 7 min level 4   Wall pushups    2 x 10       pect minor stretch in standing    10 x hold 3 sec ea      Horizontal abduction prone     2 x 10 prone  2 x 10 2 x 10 with 2 lbs   Serratus activation      2 x 10   Standing with black heavy band 2 x 10    Ball bounce on wall at 90/90       5 x 30 sec 5 x 30 sec bilateral   High rows       2 x 10 with 17 lbs on cables    Quadriped push back into ball on wall       5 sec hold x 10  10 sec hold x 10   Scaption standing       10 x  5 lbs 2 sets   Floor to waist lift        30 lbs in box 10 x 2 sets   Box carry       30 lbs 1 min x 2    Foam roll pect and end range flexion stretching       6 min total                                    Manual Therapy Interventions: (0 minutes): Manual interventions performed to improve joint/soft tissue mobility and ROM to improve ability to perform ADLs. Patient responded well to all manual interventions with no significant increase in pain/symptoms. Improved pain reported below. Technique Used Grade  Level # Time(s) Effect while being performed   occilations 2-3 GHJ 30 x 3 Pain relief increase in ROM   Passive physiologic flex/ER   30 x ea Pain relief increase movement   A/P, distraction, inf glides 3 GHJ  Inc movement   (Used abbreviations: MET - muscle energy technique; PNF - proprioceptive neuromuscular facilitation; NMR - neuromuscular re-education; AP - anterior to posterior; PA - posterior to anterior)        Treatment/Session Assessment: Patient verbalized and demonstrated understanding of HEP. AROM beginning of treatment 175 flexion in standing, strength flexion 4/5, ER 4+,abduction 3+/5. Patient saw MD yesterday received a new script for three more weeks, RTW date 11/12/18. Occasional lifting overhead max 25 lbs, floor to waist 50 lbs. · Pain/ Symptoms: Initial:Main complaints are weakness and stiffness in the morning. ·   Compliance with Program/Exercises: performing as directed. · Recommendations/Intent for next treatment session: \"Next visit will focus on functional strengthening and work conditioning with goal in mind of RTW on 11/12/18.     Future Appointments   Date Time Provider Paramjit Aldridge   10/29/2018  8:15 AM Carlie Tovar, RT [facility]   11/1/2018  9:00 AM [facility]   11/5/2018  8:15 AM Carlie Tovar, [facility]   11/8/2018  8:15 AM Carlie Tovar, [facility]   12/4/2018 10:00 AM McCraw, Lander Bamberger, MD [facility]       Total Treatment Duration: 15 min therapeutic ex, 30 min evaluation  PT Patient Time In/Time Out  Time In: 0802    Jennifer Tovar, PT              . P

## 2018-10-29 ENCOUNTER — HOSPITAL ENCOUNTER (OUTPATIENT)
Dept: PHYSICAL THERAPY | Age: 48
Discharge: HOME | End: 2018-10-29
Payer: COMMERCIAL

## 2018-10-29 PROCEDURE — 97110 THERAPEUTIC EXERCISES: CPT

## 2018-10-29 NOTE — PROGRESS NOTES
Shannan Dutta  : 1970      Payor: Flakito Horn / Plan: [place] [employer] Formerly [facility] / Product Type: PPO /    [employer] [facility] [address]. [address] [place] [address], [facility], [address] [place] [address]  [phone]   [phone]              OUTPATIENT PHYSICAL THERAPY:Daily Note 10/29/2018  Visit # 8   ICD-10: Treatment Diagnosis: Pain in left shoulder (M25.512)     Bursitis of left shoulder (M75.52)      Stiffness of the left shoulder (M25.512)     Effusion, left shoulder (M25.412)              Precautions/Allergies:   Patient has no known allergies. Fall Risk Score: 1 (? 5 = High Risk)  MD Orders: Eval and Treat  MEDICAL/REFERRING DIAGNOSIS:  S/p left shoulder acromioplasty   DATE OF ONSET: 18  REFERRING PHYSICIAN: Belkys Rosa MD  RETURN PHYSICIAN APPOINTMENT: TBD by patient      INITIAL ASSESSMENT:  Mr. Shannan Dutta has attended 1 physical therapy session including initial evaluation. Shannan Dutta presents with S/S of s/p acromioplasty, presents with post-op swelling, pain and stiffness. Shannan Dutta will benefit from skilled PT (medically necessary) to address above deficits affecting participation in basic ADLs and overall functional tolerance. PROBLEM LIST (Impacting functional limitations):  1. Decreased Strength  2. Decreased ADL/Functional Activities  3. Increased Pain  4. Decreased Activity Tolerance  5. Decreased Flexibility/Joint Mobility  6. Decreased [place] with Home Exercise Program INTERVENTIONS PLANNED:  1. Cryotherapy  2. Family Education  3. Gait Training  4. Home Exercise Program (HEP)  5. Manual Therapy  6. Neuromuscular Re-education/Strengthening  7. Range of Motion (ROM)  8. Therapeutic Activites  9. Therapeutic Exercise/Strengthening     TREATMENT PLAN:  Effective Dates: 10/1/18 TO 10/31/2018 (30 days).   Frequency/Duration: 2 times a week for 30 Days  GOALS: (Goals have been discussed and agreed upon with patient.)  SHORT-TERM FUNCTIONAL GOALS: Time Frame: 4 weeks  1. Sascha Epstein will report <=3/10 pain with don/doffing clothing as well as minimal/no difficulty. 2. Sascha Epstein will demonstrate improvement in active shoulder flexionto >130 degrees to increase UE function and participation in ADLs. 3. Sascha Epstein will demonstrate demonstrate improvement in active shoulder external roation to >70 degrees to increase UE function and participation in ADLs. [address] 6 will show a greater than 8 point decrease on the DASH in order to show an increase in upper extremity function. Kattarm[address]mandeep 6 will be independent in all HEP    DISCHARGE GOALS: Time Frame: 8 weeks    1. Sascha Epstein will show full ROM of the UE in order to return to full functional mobility   2. Sascha Epstein will show a greater than 15 point decrease on the DASH in order to show an increase in upper extremity function  3. Sascha Epstein will report doing hair/bathing without difficulty and <=2/10 pain in order to be independent with ADL's  4. Sascha Epstein will be independent in all advanced HEP     Rehabilitation Potential For Stated Goals: Good  Regarding Melisa Skinny Isaac's therapy, I certify that the treatment plan above will be carried out by a therapist or under their direction. Thank you for this referral,  RT Simon     Referring Physician Signature: Rock Irma MD              Date                    HISTORY:   History of Present Injury/Illness (Reason for Referral):  Patient reports chronic left shoulder pain starting in March 2018, no specific injury. According to Mr Faiza Long an MRI showed bones spurs. Acromioplasty performed 10 days ago. He states that he wore a sling for comfort for 5 days. Has not been doing any post -op ex at home yet. Using Motrin, ice and heat for sx control.  Having difficulty sleeping at night wakes up 3-4 times and sleeps for a total of 5 hours. Patient is currently out of work due to post-op status. He also states that he is currently having similar sx in his right shoulder. · Aggravating factors: lifting arm, bathing, dressing   · Relieving factors: rest, ice, heat       Past Medical History/Comorbidities:   Mr. Duyen Aldana  has a past medical history of Borderline high cholesterol, Chronic pain, Depression, major, in remission ([facility] [address] GERD (gastroesophageal reflux disease), GERD (gastroesophageal reflux disease), Hypercholesterolemia, Hypertension, Morbid obesity ([address] and Sleep apnea. Mr. Duyen Aldana  has a past surgical history that includes pr abdomen surgery proc unlisted (2012); LEFT SHOULDER ARTHROSCOPY SUBACROMIAL DECOMPRESSION  / DISTAL CLAVICEL RESECTION / (Left, 9/21/2018); and HERNIA VENTRAL REPAIR (N/A, 7/16/2012). 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 Plans to return to work end of October. Prior Level of Function/Work/Activity:  Full time work    Current Medications:    Current Outpatient Medications:     oxyCODONE-acetaminophen (PERCOCET) 5-325 mg per tablet, Take 1 Tab by mouth every four (4) hours as needed for Pain. Max Daily Amount: 6 Tabs. Indications: Pain, Disp: 24 Tab, Rfl: 0    amLODIPine-benazepril (LOTREL) 10-20 mg per capsule, Take 1 Cap by mouth daily. , Disp: , Rfl:     DULoxetine (CYMBALTA) 30 mg capsule, Take 30 mg by mouth daily. , Disp: , Rfl:     meloxicam (MOBIC) 15 mg tablet, Take 15 mg by mouth daily. Last dose end of August, Disp: , Rfl:     sildenafil citrate (VIAGRA PO), Take  by mouth., Disp: , Rfl:     cyclobenzaprine (FLEXERIL) 10 mg tablet, Take 1 Tab by mouth nightly. (Patient taking differently: Take 10 mg by mouth nightly. Pt takes prn), Disp: 20 Tab, Rfl: 0    lansoprazole (PREVACID) 30 mg capsule, Takes every morning.   Indications: gastroesophageal reflux disease, Disp: 90 Cap, Rfl: 1    pravastatin (PRAVACHOL) 40 mg tablet, Take 1 Tab by mouth nightly. (Patient taking differently: Take 40 mg by mouth daily.), Disp: 90 Tab, Rfl: 1    chlorthalidone (HYGROTEN) 25 mg tablet, Take 1 Tab by mouth daily. (Patient taking differently: Take 25 mg by mouth daily. Indications: hypertension), Disp: 30 Tab, Rfl: 6     Date Last Reviewed:  10/29/2018   Number of Personal Factors/Comorbidities that affect the Plan of Care: 1-2: MODERATE COMPLEXITY   EXAMINATION:   Observation/Orthostatic Postural Assessment:          Rounded shoulders, FHRS  Palpation:          Subacromial space, distal clavicle tenderness  ROM:    AROM/PROM         Joint: Eval Date: 10/2/18 Re-Assess Date:  Re-Assess Date:    ACTIVE ROM (standing) RIGHT LEFT RIGHT LEFT RIGHT LEFT   Shoulder Flexion WNL 60       Shoulder Abduction WNL 40       Shoulder Internal Rotation (Apley's) WNL Mid buttock       Shoulder External Rotation (Apley's) WNL To cheek       Elbow ROM WNL WNL       PASSIVE ROM (supine)         Shoulder Flexion  90       Shoulder Abduction  70       Shoulder Internal Rotation  35       Shoulder External Rotation  40                                      Strength:    Joint: Eval Date: 10/2/18  Re-Assess Date:  Re-Assess Date:     RIGHT LEFT RIGHT LEFT RIGHT LEFT   Shoulder Flexion 5/5 3-/5       Shoulder Abduction  (C5) 5/5 3-/5       Shoulder Internal Rotation 5/5 3-/5       Shoulder External Rotation 5/5 3-/5       Elbow Flexion  (C6) 5/5 4/5       Elbow Extension (C7)         Wrist Flexion (C7)         Wrist Extension (C6)         Resisted Thumb Extension/Finger Abduction (C8/T1)         Resisted Cervical Rotation (C1):         Resisted Shoulder Shrug (C2, 3, 4):  5- 4        Strength                                      Neurological Screen: Assessed @ Initial Visit    Radiating symptoms? no  Functional Mobility:  Assessed @ Initial Visit   Balance: NA     Body Structures Involved:  1. Bones  2. Joints  3. Muscles  4.  Ligaments Body Functions Affected:  1. Sensory/Pain  2. Neuromusculoskeletal  3. Movement Related Activities and Participation Affected:  1. Mobility  2. Self Care   Number of elements that affect the Plan of Care: 3: MODERATE COMPLEXITY   CLINICAL PRESENTATION:   Presentation: Evolving clinical presentation with changing clinical characteristics: MODERATE COMPLEXITY   CLINICAL DECISION MAKING:   Outcome Measure: Tool Used: Disabilities of the Arm, Shoulder and Hand (DASH) Questionnaire - Quick Version  Score:  Initial: 41/55  Most Recent: X/55 (Date: -- )   Interpretation of Score: The DASH is designed to measure the activities of daily living in person's with upper extremity dysfunction or pain. Each section is scored on a 1-5 scale, 5 representing the greatest disability. The scores of each section are added together for a total score of 55. Score 11 12-19 20-28 29-37 38-45 46-54 55   Modifier CH CI CJ CK CL CM CN     ? Carrying, Moving, and Handling Objects:     - CURRENT STATUS: CL - 60%-79% impaired, limited or restricted    - GOAL STATUS: CI - 1%-19% impaired, limited or restricted    - D/C STATUS:  ---------------To be determined---------------    Medical Necessity:   · Skilled intervention continues to be required due to deficits and impairments seen upon initial evaluation affecting patient's participation in ADLs and functional tasks. *  ·   Reason for Services/Other Comments:  · Patient continues to require skilled intervention due to deficits and impairments seen upon initial evaluation affecting patient's participation in ADLs and functional tasks. TREATMENT:   (In addition to Assessment/Re-Assessment sessions the following treatments were rendered)  THERAPEUTIC EXERCISE:45 minutes):  Exercises per grid below to improve mobility, strength and balance. Required minimal visual and verbal cues to promote proper body alignment, promote proper body posture and promote proper body mechanics.   Progressed resistance, range and repetitions as indicated.      Date:  10/1/18 Date:  10/4/18 Date:  10/8/18 Date  10/15/18 Date  10/18/18 Date  10/22/18 Date  10/25/18 Date  10/29/18   Activity/Exercise Parameters Parameters Parameters parameters parameters parameters parameters    pendulum 20 x 2          Table slide 10 x flexion 10 x focus on breathing pattern         ER supine with cane 20 x 30 x         Shoulder shrugs 10 x 2 lbs 2  3lbs 2 x 10 2 x 10 5 lbs  3 x 10 10 lbs 3 x 10 10 lbs 3 x 10 10 lbs   Scapular retraction 10 x 10 x 10 x  In prone W position 2 x 10  W 2 x 10 Prone Ws 2 x 10 with 2 lbs 2 x 10 3 lbs   Patient education Treatment rational, expectations, pain control, HEP Pain management Progression of HEP Progression of HE        Isometric ER with  band  5 x 30 sec hold 30 sec relax 5 x 30 sec hol  2 x 10  To 45 ER red band 2 x 10 ER red band Con/eccentric heavy red band 3 x 5     Therapy ball rolling standing  5 min         Diaphragmatic breathing  15 x         Wall slide   Assisted by slider and right hand 10 x 2 x 10 with lift off end range flexion   2 x 10 With lift off 2 x 10  2 x 10 with lift off 2 x 10    Rows    Heavy red 30 lb band 3 x 10 Heavy black 50 lb band 2 x 10 Heavy red 3 x 10      Hip hinge stretch at bars   5 x 6 x 6 x 6 x 6 x 6 x    Shoulder extension with pole standing   10 x 2 10 x 3 x 10 with 5 lb pole 2 x 10     Shoulder IR with strap    6 x Sleeper stretch Standing 6 x  6 x 6 x    UBE    5 min level 3 6 min level 3.5  6 min level 4 7 min level 4 8 min level 5   Wall pushups    2 x 10        pect minor stretch in standing    10 x hold 3 sec ea       Horizontal abduction prone     2 x 10 prone  2 x 10 2 x 10 with 2 lbs 2 x 10 3 lbs   Serratus activation      2 x 10   Standing with black heavy band 2 x 10     Ball bounce on wall at 90/90       5 x 30 sec 5 x 30 sec bilateral 1 min x 2 ea   High rows       2 x 10 with 17 lbs on cables  40 lbs 3 x 10   Quadriped push back into ball on wall 5 sec hold x 10  10 sec hold x 10 Walk outs from kneeling two steps forward with hands 2 min   Scaption standing       10 x  5 lbs 2 sets    Floor to waist lift        30 lbs in box 10 x 2 sets 40 lbs 10 x 2   Box carry       30 lbs 1 min x 2  40 lbs one min    Foam roll pect and end range flexion stretching       6 min total    Prone planks         Hold 5 sec 10 x                            Manual Therapy Interventions: (0 minutes): Manual interventions performed to improve joint/soft tissue mobility and ROM to improve ability to perform ADLs. Patient responded well to all manual interventions with no significant increase in pain/symptoms. Improved pain reported below. Technique Used Grade  Level # Time(s) Effect while being performed   occilations 2-3 GHJ 30 x 3 Pain relief increase in ROM   Passive physiologic flex/ER   30 x ea Pain relief increase movement   A/P, distraction, inf glides 3 GHJ  Inc movement   (Used abbreviations: MET - muscle energy technique; PNF - proprioceptive neuromuscular facilitation; NMR - neuromuscular re-education; AP - anterior to posterior; PA - posterior to anterior)        Treatment/Session Assessment: Patient verbalized and demonstrated understanding of HEP. RTW date 11/12/18. Occasional lifting overhead max 25 lbs, floor to waist 50 lbs. · Pain/ Symptoms: Initial:Main complaints are weakness and stiffness in the morning. Has to leave by 9:00 am today. ·   Compliance with Program/Exercises: performing as directed. · Recommendations/Intent for next treatment session: \"Next visit will focus on functional strengthening and work conditioning with goal in mind of RTW on 11/12/18. Resume full treatment next visit.     Future Appointments   Date Time Provider Paramjit Aldridge   11/1/2018  9:00 AM Papenfuss, Nilda Schaumann, RT [facility]   11/5/2018  8:15 AM Papenfuss, Nilda Schaumann, RT [facility]   11/8/2018  8:15 AM Papenfuss, Nilda Schaumann, RT [facility]   12/4/2018 10:00 AM Efren Olson MD [facility]       Total Treatment Duration: 15 min therapeutic ex, 30 min evaluation  PT Patient Time In/Time Out  Time In: 0815  Time Out: 0900    Jennifer Tovar, PT              . P

## 2018-11-01 ENCOUNTER — APPOINTMENT (OUTPATIENT)
Dept: PHYSICAL THERAPY | Age: 48
End: 2018-11-01
Payer: COMMERCIAL

## 2018-11-05 ENCOUNTER — HOSPITAL ENCOUNTER (OUTPATIENT)
Dept: PHYSICAL THERAPY | Age: 48
Discharge: HOME | End: 2018-11-05
Payer: COMMERCIAL

## 2018-11-05 PROCEDURE — 97110 THERAPEUTIC EXERCISES: CPT

## 2018-11-05 NOTE — PROGRESS NOTES
Melita Thomson  : 1970      Payor: Christen Carmichael / Plan: [place] [facility] / Product Type: PPO /    [facility] at [address] [place] [address] [address] [place] [address], [address]  [phone]   [phone]              OUTPATIENT PHYSICAL THERAPY:Daily Note 2018  Visit # 9   ICD-10: Treatment Diagnosis: Pain in left shoulder (M25.512)     Bursitis of left shoulder (M75.52)      Stiffness of the left shoulder (M25.512)     Effusion, left shoulder (M25.412)              Precautions/Allergies:   Patient has no known allergies. Fall Risk Score: 1 (? 5 = High Risk)  MD Orders: Eval and Treat  MEDICAL/REFERRING DIAGNOSIS:  S/p left shoulder acromioplasty   DATE OF ONSET: 18  REFERRING PHYSICIAN: Rivera Rosa MD  RETURN PHYSICIAN APPOINTMENT: TBD by patient      INITIAL ASSESSMENT:  Mr. Melita Thomson has attended 1 physical therapy session including initial evaluation. Melita Thomson presents with S/S of s/p acromioplasty, presents with post-op swelling, pain and stiffness. Melita Thomson will benefit from skilled PT (medically necessary) to address above deficits affecting participation in basic ADLs and overall functional tolerance. PROBLEM LIST (Impacting functional limitations):  1. Decreased Strength  2. Decreased ADL/Functional Activities  3. Increased Pain  4. Decreased Activity Tolerance  5. Decreased Flexibility/Joint Mobility  6. Decreased [place] with Home Exercise Program INTERVENTIONS PLANNED:  1. Cryotherapy  2. Family Education  3. Gait Training  4. Home Exercise Program (HEP)  5. Manual Therapy  6. Neuromuscular Re-education/Strengthening  7. Range of Motion (ROM)  8. Therapeutic Activites  9. Therapeutic Exercise/Strengthening     TREATMENT PLAN:  Effective Dates: 10/1/18 TO 10/31/2018 (30 days).   Frequency/Duration: 2 times a week for 30 Days  GOALS: (Goals have been discussed and agreed upon with patient.)  SHORT-TERM FUNCTIONAL GOALS: Time Frame: 4 weeks  1. Juan Tapia will report <=3/10 pain with don/doffing clothing as well as minimal/no difficulty. 2. Juan Tapia will demonstrate improvement in active shoulder flexionto >130 degrees to increase UE function and participation in ADLs. 3. Juan Tapia will demonstrate demonstrate improvement in active shoulder external roation to >70 degrees to increase UE function and participation in ADLs. [place]e Oracio will show a greater than 8 point decrease on the DASH in order to show an increase in upper extremity function. Vian[place] 6 will be independent in all HEP    DISCHARGE GOALS: Time Frame: 8 weeks    1. Juan Tapia will show full ROM of the UE in order to return to full functional mobility   2. Juan Tapia will show a greater than 15 point decrease on the DASH in order to show an increase in upper extremity function  3. Juan Tapia will report doing hair/bathing without difficulty and <=2/10 pain in order to be independent with ADL's  4. Juan Tapia will be independent in all advanced HEP     Rehabilitation Potential For Stated Goals: Good  Regarding Mildred Wilde Isaac's therapy, I certify that the treatment plan above will be carried out by a therapist or under their direction. Thank you for this referral,  Anna Sandhu, RT     Referring Physician Signature: Petrona Betancur MD              Date                    HISTORY:   History of Present Injury/Illness (Reason for Referral):  Patient reports chronic left shoulder pain starting in March 2018, no specific injury. According to Mr Zachary Prater an MRI showed bones spurs. Acromioplasty performed 10 days ago. He states that he wore a sling for comfort for 5 days. Has not been doing any post -op ex at home yet. Using Motrin, ice and heat for sx control.  Having difficulty sleeping at night wakes up 3-4 times and sleeps for a total of 5 hours. Patient is currently out of work due to post-op status. He also states that he is currently having similar sx in his right shoulder. · Aggravating factors: lifting arm, bathing, dressing   · Relieving factors: rest, ice, heat       Past Medical History/Comorbidities:   Mr. Davi Khoury  has a past medical history of Borderline high cholesterol, Chronic pain, Depression, major, in remission ([facility] [address] GERD (gastroesophageal reflux disease), GERD (gastroesophageal reflux disease), Hypercholesterolemia, Hypertension, Morbid obesity ([address] and Sleep apnea. Mr. Davi Khoury  has a past surgical history that includes pr abdomen surgery proc unlisted (2012); LEFT SHOULDER ARTHROSCOPY SUBACROMIAL DECOMPRESSION  / DISTAL CLAVICEL RESECTION / (Left, 9/21/2018); and HERNIA VENTRAL REPAIR (N/A, 7/16/2012). 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 Plans to return to work end of October. Prior Level of Function/Work/Activity:  Full time work    Current Medications:    Current Outpatient Medications:     oxyCODONE-acetaminophen (PERCOCET) 5-325 mg per tablet, Take 1 Tab by mouth every four (4) hours as needed for Pain. Max Daily Amount: 6 Tabs. Indications: Pain, Disp: 24 Tab, Rfl: 0    amLODIPine-benazepril (LOTREL) 10-20 mg per capsule, Take 1 Cap by mouth daily. , Disp: , Rfl:     DULoxetine (CYMBALTA) 30 mg capsule, Take 30 mg by mouth daily. , Disp: , Rfl:     meloxicam (MOBIC) 15 mg tablet, Take 15 mg by mouth daily. Last dose end of August, Disp: , Rfl:     sildenafil citrate (VIAGRA PO), Take  by mouth., Disp: , Rfl:     cyclobenzaprine (FLEXERIL) 10 mg tablet, Take 1 Tab by mouth nightly. (Patient taking differently: Take 10 mg by mouth nightly. Pt takes prn), Disp: 20 Tab, Rfl: 0    lansoprazole (PREVACID) 30 mg capsule, Takes every morning.   Indications: gastroesophageal reflux disease, Disp: 90 Cap, Rfl: 1    pravastatin (PRAVACHOL) 40 mg tablet, Take 1 Tab by mouth nightly. (Patient taking differently: Take 40 mg by mouth daily.), Disp: 90 Tab, Rfl: 1    chlorthalidone (HYGROTEN) 25 mg tablet, Take 1 Tab by mouth daily. (Patient taking differently: Take 25 mg by mouth daily. Indications: hypertension), Disp: 30 Tab, Rfl: 6     Date Last Reviewed:  11/5/2018   Number of Personal Factors/Comorbidities that affect the Plan of Care: 1-2: MODERATE COMPLEXITY   EXAMINATION:   Observation/Orthostatic Postural Assessment:          Rounded shoulders, FHRS  Palpation:          Subacromial space, distal clavicle tenderness  ROM:    AROM/PROM         Joint: Eval Date: 10/2/18 Re-Assess Date:  Re-Assess Date:    ACTIVE ROM (standing) RIGHT LEFT RIGHT LEFT RIGHT LEFT   Shoulder Flexion WNL 60       Shoulder Abduction WNL 40       Shoulder Internal Rotation (Apley's) WNL Mid buttock       Shoulder External Rotation (Apley's) WNL To cheek       Elbow ROM WNL WNL       PASSIVE ROM (supine)         Shoulder Flexion  90       Shoulder Abduction  70       Shoulder Internal Rotation  35       Shoulder External Rotation  40                                      Strength:    Joint: Eval Date: 10/2/18  Re-Assess Date:  Re-Assess Date:     RIGHT LEFT RIGHT LEFT RIGHT LEFT   Shoulder Flexion 5/5 3-/5       Shoulder Abduction  (C5) 5/5 3-/5       Shoulder Internal Rotation 5/5 3-/5       Shoulder External Rotation 5/5 3-/5       Elbow Flexion  (C6) 5/5 4/5       Elbow Extension (C7)         Wrist Flexion (C7)         Wrist Extension (C6)         Resisted Thumb Extension/Finger Abduction (C8/T1)         Resisted Cervical Rotation (C1):         Resisted Shoulder Shrug (C2, 3, 4):  5- 4        Strength                                      Neurological Screen: Assessed @ Initial Visit    Radiating symptoms? no  Functional Mobility:  Assessed @ Initial Visit   Balance: NA     Body Structures Involved:  1. Bones  2. Joints  3. Muscles  4.  Ligaments Body Functions Affected:  1. Sensory/Pain  2. Neuromusculoskeletal  3. Movement Related Activities and Participation Affected:  1. Mobility  2. Self Care   Number of elements that affect the Plan of Care: 3: MODERATE COMPLEXITY   CLINICAL PRESENTATION:   Presentation: Evolving clinical presentation with changing clinical characteristics: MODERATE COMPLEXITY   CLINICAL DECISION MAKING:   Outcome Measure: Tool Used: Disabilities of the Arm, Shoulder and Hand (DASH) Questionnaire - Quick Version  Score:  Initial: 41/55  Most Recent: X/55 (Date: -- )   Interpretation of Score: The DASH is designed to measure the activities of daily living in person's with upper extremity dysfunction or pain. Each section is scored on a 1-5 scale, 5 representing the greatest disability. The scores of each section are added together for a total score of 55. Score 11 12-19 20-28 29-37 38-45 46-54 55   Modifier CH CI CJ CK CL CM CN     ? Carrying, Moving, and Handling Objects:     - CURRENT STATUS: CL - 60%-79% impaired, limited or restricted    - GOAL STATUS: CI - 1%-19% impaired, limited or restricted    - D/C STATUS:  ---------------To be determined---------------    Medical Necessity:   · Skilled intervention continues to be required due to deficits and impairments seen upon initial evaluation affecting patient's participation in ADLs and functional tasks. *  ·   Reason for Services/Other Comments:  · Patient continues to require skilled intervention due to deficits and impairments seen upon initial evaluation affecting patient's participation in ADLs and functional tasks. TREATMENT:   (In addition to Assessment/Re-Assessment sessions the following treatments were rendered)  THERAPEUTIC EXERCISE:40 minutes):  Exercises per grid below to improve mobility, strength and balance. Required minimal visual and verbal cues to promote proper body alignment, promote proper body posture and promote proper body mechanics.   Progressed resistance, range and repetitions as indicated.      Date:  10/1/18 Date:  10/4/18 Date:  10/8/18 Date  10/15/18 Date  10/18/18 Date  10/22/18 Date  10/25/18 Date  10/29/18 Date  11/5/18   Activity/Exercise Parameters Parameters Parameters parameters parameters parameters parameters     Shoulder shrugs 10 x 2 lbs 2  3lbs 2 x 10 2 x 10 5 lbs  3 x 10 10 lbs 3 x 10 10 lbs 3 x 10 10 lbs 3 x 10 17 lbs on cable machine   Scapular retraction 10 x 10 x 10 x  In prone W position 2 x 10  W 2 x 10 Prone Ws 2 x 10 with 2 lbs 2 x 10 3 lbs    Patient education Treatment rational, expectations, pain control, HEP Pain management Progression of HEP Progression of HE         Wall slide   Assisted by slider and right hand 10 x 2 x 10 with lift off end range flexion   2 x 10 With lift off 2 x 10  2 x 10 with lift off 2 x 10  2 x 10   Hip hinge stretch at bars   5 x 6 x 6 x 6 x 6 x 6 x  6 x   Shoulder IR with strap    6 x Sleeper stretch Standing 6 x  6 x 6 x  6 x   UBE    5 min level 3 6 min level 3.5  6 min level 4 7 min level 4 8 min level 5 8 min level 5   Horizontal abduction prone     2 x 10 prone  2 x 10 2 x 10 with 2 lbs 2 x 10 3 lbs 2 x 10 3 lbs   Serratus activation      2 x 10   Standing with black heavy band 2 x 10      Ball bounce on wall at 90/90       5 x 30 sec 5 x 30 sec bilateral 1 min x 2 ea 1 min x 2    High rows       2 x 10 with 17 lbs on cables  40 lbs 3 x 10 37 lbs 3 x 10 with squat    Quadriped push back into ball on wall       5 sec hold x 10  10 sec hold x 10 Walk outs from kneeling two steps forward with hands 2 min    Scaption standing       10 x  5 lbs 2 sets  6 lbs 3 x 10   Floor to waist lift        30 lbs in box 10 x 2 sets 40 lbs 10 x 2    Box carry       30 lbs 1 min x 2  40 lbs one min     Foam roll pect and end range flexion stretching       6 min total  5 min   Prone planks         Hold 5 sec 10 x Hold 10 sec  X 10   Bear crawl          1 min   Squat with overhead reach         [employer] band using pole 2 x 10      Manual Therapy Interventions: (0 minutes): Manual interventions performed to improve joint/soft tissue mobility and ROM to improve ability to perform ADLs. Patient responded well to all manual interventions with no significant increase in pain/symptoms. Improved pain reported below. Technique Used Grade  Level # Time(s) Effect while being performed   occilations 2-3 GHJ 30 x 3 Pain relief increase in ROM   Passive physiologic flex/ER   30 x ea Pain relief increase movement   A/P, distraction, inf glides 3 GHJ  Inc movement   (Used abbreviations: MET - muscle energy technique; PNF - proprioceptive neuromuscular facilitation; NMR - neuromuscular re-education; AP - anterior to posterior; PA - posterior to anterior)        Treatment/Session Assessment: Patient verbalized and demonstrated understanding of HEP. RTW date 11/12/18. Occasional lifting overhead max 25 lbs, floor to waist 50 lbs. · Pain/ Symptoms: Initial:Main complaints are weakness and stiffness in the morning. Has to leave by 9:00 am today. ·   Compliance with Program/Exercises: performing as directed. · Recommendations/Intent for next treatment session: \"Next visit will focus on functional strengthening and work conditioning with goal in mind of RTW on 11/12/18.  Client has had to leave early last two sessions    Future Appointments   Date Time Provider Paramjit Rochai   11/5/2018  8:15 AM Vladimir Tovar, RT [facility]   11/8/2018  8:15 AM Vladimir Tovar, RT [facility]   12/4/2018 10:00 AM Tiff Olson MD [facility]       Total Treatment Duration: 40 min       Jennifer Tovar, PT              . P

## 2018-11-08 ENCOUNTER — HOSPITAL ENCOUNTER (OUTPATIENT)
Dept: PHYSICAL THERAPY | Age: 48
Discharge: HOME | End: 2018-11-08
Payer: COMMERCIAL

## 2018-11-08 PROCEDURE — 97110 THERAPEUTIC EXERCISES: CPT

## 2018-11-08 NOTE — THERAPY DISCHARGE
Joselyn Wu  : 1970      Payor: Melanie Fuentes / Plan: [facility] / Product Type: PPO /    [address], [place], [id number] [place] [address] Road  [phone]   [phone]              OUTPATIENT PHYSICAL THERAPY:Daily Note and Discharge 2018  Visit # 10   ICD-10: Treatment Diagnosis: Pain in left shoulder (M25.512)     Bursitis of left shoulder (M75.52)      Stiffness of the left shoulder (M25.512)     Effusion, left shoulder (M25.412)              Precautions/Allergies:   Patient has no known allergies. Fall Risk Score: 1 (? 5 = High Risk)  MD Orders: Eval and Treat  MEDICAL/REFERRING DIAGNOSIS:  S/p left shoulder acromioplasty   DATE OF ONSET: 18  REFERRING PHYSICIAN: Radha Rosa MD  RETURN PHYSICIAN APPOINTMENT: TBD by patient      INITIAL ASSESSMENT:  Mr. Joselyn Wu has attended 1 physical therapy session including initial evaluation. Joselyn Wu presents with S/S of s/p acromioplasty, presents with post-op swelling, pain and stiffness. Joselyn Wu will benefit from skilled PT (medically necessary) to address above deficits affecting participation in basic ADLs and overall functional tolerance. PROBLEM LIST (Impacting functional limitations):  1. Decreased Strength  2. Decreased ADL/Functional Activities  3. Increased Pain  4. Decreased Activity Tolerance  5. Decreased Flexibility/Joint Mobility  6. Decreased [place] with Home Exercise Program INTERVENTIONS PLANNED:  1. Cryotherapy  2. Family Education  3. Gait Training  4. Home Exercise Program (HEP)  5. Manual Therapy  6. Neuromuscular Re-education/Strengthening  7. Range of Motion (ROM)  8. Therapeutic Activites  9. Therapeutic Exercise/Strengthening     TREATMENT PLAN:  Effective Dates: 10/1/18 TO 10/31/2018 (30 days).   Frequency/Duration: 2 times a week for 30 Days  GOALS: (Goals have been discussed and agreed upon with patient.)  SHORT-TERM FUNCTIONAL GOALS: Time Frame: 4 weeks  1. Nicola Kumar will report <=3/10 pain with don/doffing clothing as well as minimal/no difficulty. 11/8/2018  2. Nicola Kumar will demonstrate improvement in active shoulder flexionto >130 degrees to increase UE function and participation in ADLs. 11/8/2018    3. Milderdon Kumar will demonstrate demonstrate improvement in active shoulder external roation to >70 degrees to increase UE function and participation in ADLs. 11/8/2018    4. Nicola Kumar will show a greater than 8 point decrease on the DASH in order to show an increase in upper extremity function. 11/8/2018    5. Nicola Kumar will be independent in all HEP 11/8/2018      DISCHARGE GOALS: Time Frame: 8 weeks    1. Nicola Kumar will show full ROM of the UE in order to return to full functional mobility 11/8/2018    2. Nicola Kumar will show a greater than 15 point decrease on the DASH in order to show an increase in upper extremity function 11/8/2018    3. Nicola Kumar will report doing hair/bathing without difficulty and <=2/10 pain in order to be independent with ADL's 11/8/2018    4. Nicola Kumar will be independent in all advanced HEP 11/8/2018                  HISTORY:   History of Present Injury/Illness (Reason for Referral):  Patient reports chronic left shoulder pain starting in March 2018, no specific injury. According to Mr Nancy Garrido an MRI showed bones spurs. Acromioplasty performed 10 days ago. He states that he wore a sling for comfort for 5 days. Has not been doing any post -op ex at home yet. Using Motrin, ice and heat for sx control. Having difficulty sleeping at night wakes up 3-4 times and sleeps for a total of 5 hours. Patient is currently out of work due to post-op status. He also states that he is currently having similar sx in his right shoulder.       · Aggravating factors: lifting arm, bathing, dressing · Relieving factors: rest, ice, heat       Past Medical History/Comorbidities:   Mr. Fady Peralta  has a past medical history of Borderline high cholesterol, Chronic pain, Depression, major, in remission ([facility] [address] GERD (gastroesophageal reflux disease), GERD (gastroesophageal reflux disease), Hypercholesterolemia, Hypertension, Morbid obesity ([facility] [address] and Sleep apnea. Mr. Fady Peralta  has a past surgical history that includes pr abdomen surgery proc unlisted (2012); LEFT SHOULDER ARTHROSCOPY SUBACROMIAL DECOMPRESSION  / DISTAL CLAVICEL RESECTION / (Left, 9/21/2018); and HERNIA VENTRAL REPAIR (N/A, 7/16/2012). 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 Plans to return to work end of October. Prior Level of Function/Work/Activity:  Full time work    Current Medications:    Current Outpatient Medications:     oxyCODONE-acetaminophen (PERCOCET) 5-325 mg per tablet, Take 1 Tab by mouth every four (4) hours as needed for Pain. Max Daily Amount: 6 Tabs. Indications: Pain, Disp: 24 Tab, Rfl: 0    amLODIPine-benazepril (LOTREL) 10-20 mg per capsule, Take 1 Cap by mouth daily. , Disp: , Rfl:     DULoxetine (CYMBALTA) 30 mg capsule, Take 30 mg by mouth daily. , Disp: , Rfl:     meloxicam (MOBIC) 15 mg tablet, Take 15 mg by mouth daily. Last dose end of August, Disp: , Rfl:     sildenafil citrate (VIAGRA PO), Take  by mouth., Disp: , Rfl:     cyclobenzaprine (FLEXERIL) 10 mg tablet, Take 1 Tab by mouth nightly. (Patient taking differently: Take 10 mg by mouth nightly. Pt takes prn), Disp: 20 Tab, Rfl: 0    lansoprazole (PREVACID) 30 mg capsule, Takes every morning. Indications: gastroesophageal reflux disease, Disp: 90 Cap, Rfl: 1    pravastatin (PRAVACHOL) 40 mg tablet, Take 1 Tab by mouth nightly. (Patient taking differently: Take 40 mg by mouth daily.), Disp: 90 Tab, Rfl: 1    chlorthalidone (HYGROTEN) 25 mg tablet, Take 1 Tab by mouth daily.  (Patient taking differently: Take 25 mg by mouth daily. Indications: hypertension), Disp: 30 Tab, Rfl: 6     Date Last Reviewed:  11/8/2018   EXAMINATION:   Observation/Orthostatic Postural Assessment:          Rounded shoulders, FHRS  Palpation:          Subacromial space, distal clavicle tenderness  ROM:    AROM/PROM         Joint: Eval Date: 10/2/18 Re-Assess Date: 11/8/18  Re-Assess Date:    ACTIVE ROM (standing) RIGHT LEFT RIGHT LEFT RIGHT LEFT   Shoulder Flexion WNL 60  170     Shoulder Abduction WNL 40  170     Shoulder Internal Rotation (Apley's) WNL Mid buttock  T10     Shoulder External Rotation (Apley's) WNL To cheek  C7     Elbow ROM WNL WNL       PASSIVE ROM (supine)         Shoulder Flexion  90  180     Shoulder Abduction  70  180     Shoulder Internal Rotation  35  75     Shoulder External Rotation  40  90                                    Strength:    Joint: Eval Date: 10/2/18  Re-Assess Date: 11/8/18  Re-Assess Date:     RIGHT LEFT RIGHT LEFT RIGHT LEFT   Shoulder Flexion 5/5 3-/5  5-     Shoulder Abduction  (C5) 5/5 3-/5  4+     Shoulder Internal Rotation 5/5 3-/5  5     Shoulder External Rotation 5/5 3-/5  5     Elbow Flexion  (C6) 5/5 4/5  5     Elbow Extension (C7)         Wrist Flexion (C7)         Wrist Extension (C6)         Resisted Thumb Extension/Finger Abduction (C8/T1)         Resisted Cervical Rotation (C1):         Resisted Shoulder Shrug (C2, 3, 4):  5- 4 5 5      Strength                                         CLINICAL PRESENTATION:   CLINICAL DECISION MAKING:   Outcome Measure: Tool Used: Disabilities of the Arm, Shoulder and Hand (DASH) Questionnaire - Quick Version  Score:  Initial: 41/55  Most Recent: 18/55 (Date: 11/8/18 )   Interpretation of Score: The DASH is designed to measure the activities of daily living in person's with upper extremity dysfunction or pain. Each section is scored on a 1-5 scale, 5 representing the greatest disability.   The scores of each section are added together for a total score of 55.    Score 11 12-19 20-28 29-37 38-45 46-54 55   Modifier CH CI CJ CK CL CM CN     ? Carrying, Moving, and Handling Objects:     - CURRENT STATUS: CI - 1%-19% impaired, limited or restricted    - GOAL STATUS: CI - 1%-19% impaired, limited or restricted    - D/C STATUS:  CI - 1%-19% impaired, limited or restricted    Medical Necessity:   · Skilled intervention continues to be required due to deficits and impairments seen upon initial evaluation affecting patient's participation in ADLs and functional tasks. *  ·   Reason for Services/Other Comments:  · Patient continues to require skilled intervention due to deficits and impairments seen upon initial evaluation affecting patient's participation in ADLs and functional tasks. TREATMENT:   (In addition to Assessment/Re-Assessment sessions the following treatments were rendered)  THERAPEUTIC EXERCISE:45 minutes):  Exercises per grid below to improve mobility, strength and balance. Required minimal visual and verbal cues to promote proper body alignment, promote proper body posture and promote proper body mechanics. Progressed resistance, range and repetitions as indicated.      Date:  10/1/18 Date:  10/4/18 Date:  10/8/18 Date  10/15/18 Date  10/18/18 Date  10/22/18 Date  10/25/18 Date  10/29/18 Date  11/5/18    Activity/Exercise Parameters Parameters Parameters parameters parameters parameters parameters      Shoulder shrugs 10 x 2 lbs 2  3lbs 2 x 10 2 x 10 5 lbs  3 x 10 10 lbs 3 x 10 10 lbs 3 x 10 10 lbs 3 x 10 17 lbs on cable machine    Scapular retraction 10 x 10 x 10 x  In prone W position 2 x 10  W 2 x 10 Prone Ws 2 x 10 with 2 lbs 2 x 10 3 lbs     Patient education Treatment rational, expectations, pain control, HEP Pain management Progression of HEP Progression of HE          Wall slide   Assisted by slider and right hand 10 x 2 x 10 with lift off end range flexion   2 x 10 With lift off 2 x 10  2 x 10 with lift off 2 x 10  2 x 10 With blue theraband 10 x clock ez s   Hip hinge stretch at bars   5 x 6 x 6 x 6 x 6 x 6 x  6 x 6 x   Shoulder IR with strap    6 x Sleeper stretch Standing 6 x  6 x 6 x  6 x 6 x   UBE    5 min level 3 6 min level 3.5  6 min level 4 7 min level 4 8 min level 5 8 min level 5 8 min level 6   Horizontal abduction prone     2 x 10 prone  2 x 10 2 x 10 with 2 lbs 2 x 10 3 lbs 2 x 10 3 lbs 2 x 10 4 lbs   Serratus activation      2 x 10   Standing with black heavy band 2 x 10       Ball bounce on wall at 90/90       5 x 30 sec 5 x 30 sec bilateral 1 min x 2 ea 1 min x 2  1 min x 2   High rows       2 x 10 with 17 lbs on cables  40 lbs 3 x 10 37 lbs 3 x 10 with squat  40 lbs with 5 lb bar 3 x 10   Quadriped push back into ball on wall       5 sec hold x 10  10 sec hold x 10 Walk outs from kneeling two steps forward with hands 2 min     Scaption standing       10 x  5 lbs 2 sets  6 lbs 3 x 10 7 lbs 2 x 10   Floor to waist lift        30 lbs in box 10 x 2 sets 40 lbs 10 x 2     Box carry       30 lbs 1 min x 2  40 lbs one min      Foam roll pect and end range flexion stretching       6 min total  5 min    Prone planks         Hold 5 sec 10 x Hold 10 sec  X 10 Hold 20 sec x 10   Bear crawl          1 min 1 min    Squat with overhead reach         [employer] band using pole 2 x 10 With 5 lb bar and blue cook band 20 x      Manual Therapy Interventions: (0 minutes): Manual interventions performed to improve joint/soft tissue mobility and ROM to improve ability to perform ADLs. Patient responded well to all manual interventions with no significant increase in pain/symptoms. Improved pain reported below.        Technique Used Grade  Level # Time(s) Effect while being performed   occilations 2-3 GHJ 30 x 3 Pain relief increase in ROM   Passive physiologic flex/ER   30 x ea Pain relief increase movement   A/P, distraction, inf glides 3 GHJ  Inc movement   (Used abbreviations: MET - muscle energy technique; PNF - proprioceptive neuromuscular facilitation; NMR - neuromuscular re-education; AP - anterior to posterior; PA - posterior to anterior)        Treatment/Session Assessment: Patient verbalized and demonstrated understanding of HEP. RTW date 11/12/18. Occasional lifting overhead max 25 lbs, floor to waist 50 lbs. · Pain/ Symptoms: Initial, ready to return to work minimal pain  ·   Compliance with Program/Exercises: performing as directed.   · Recommendations: DC to HEP, RTW 11/12/18    Future Appointments   Date Time Provider Paramjit Aldridge   12/4/2018 10:00 AM Miguel Angel Olson MD [facility]       Total Treatment Duration: 45 min  PT Patient Time In/Time Out  Time In: 0815  Time Out: 0900    Jennifer Tovar, PT              . P

## 2021-07-28 NOTE — PROGRESS NOTES
Hip x-rays were both normal, knee x-rays were reported separately but also normal.  Encourage him to take these film results back to his [facility] doctor for further consideration.

## 2021-08-24 ENCOUNTER — HOSPITAL ENCOUNTER (OUTPATIENT)
Dept: LAB | Age: 51
Discharge: HOME | End: 2021-08-24

## 2021-08-24 PROCEDURE — 88305 TISSUE EXAM BY PATHOLOGIST: CPT

## 2022-06-15 ENCOUNTER — OFFICE VISIT (OUTPATIENT)
Dept: FAMILY MEDICINE CLINIC | Facility: CLINIC | Age: 52
End: 2022-06-15
Payer: COMMERCIAL

## 2022-06-15 VITALS
SYSTOLIC BLOOD PRESSURE: 108 MMHG | BODY MASS INDEX: 33.74 KG/M2 | HEIGHT: 71 IN | WEIGHT: 241 LBS | DIASTOLIC BLOOD PRESSURE: 70 MMHG

## 2022-06-15 DIAGNOSIS — U07.1 COVID-19: Primary | ICD-10-CM

## 2022-06-15 PROCEDURE — 99214 OFFICE O/P EST MOD 30 MIN: CPT | Performed by: FAMILY MEDICINE

## 2022-06-15 ASSESSMENT — PATIENT HEALTH QUESTIONNAIRE - PHQ9
9. THOUGHTS THAT YOU WOULD BE BETTER OFF DEAD, OR OF HURTING YOURSELF: 0
SUM OF ALL RESPONSES TO PHQ9 QUESTIONS 1 & 2: 0
SUM OF ALL RESPONSES TO PHQ QUESTIONS 1-9: 0
1. LITTLE INTEREST OR PLEASURE IN DOING THINGS: 0
3. TROUBLE FALLING OR STAYING ASLEEP: 0
8. MOVING OR SPEAKING SO SLOWLY THAT OTHER PEOPLE COULD HAVE NOTICED. OR THE OPPOSITE, BEING SO FIGETY OR RESTLESS THAT YOU HAVE BEEN MOVING AROUND A LOT MORE THAN USUAL: 0
SUM OF ALL RESPONSES TO PHQ QUESTIONS 1-9: 0
SUM OF ALL RESPONSES TO PHQ QUESTIONS 1-9: 0
7. TROUBLE CONCENTRATING ON THINGS, SUCH AS READING THE NEWSPAPER OR WATCHING TELEVISION: 0
5. POOR APPETITE OR OVEREATING: 0
10. IF YOU CHECKED OFF ANY PROBLEMS, HOW DIFFICULT HAVE THESE PROBLEMS MADE IT FOR YOU TO DO YOUR WORK, TAKE CARE OF THINGS AT HOME, OR GET ALONG WITH OTHER PEOPLE: 0
2. FEELING DOWN, DEPRESSED OR HOPELESS: 0
6. FEELING BAD ABOUT YOURSELF - OR THAT YOU ARE A FAILURE OR HAVE LET YOURSELF OR YOUR FAMILY DOWN: 0
4. FEELING TIRED OR HAVING LITTLE ENERGY: 0
SUM OF ALL RESPONSES TO PHQ QUESTIONS 1-9: 0

## 2022-06-15 ASSESSMENT — ANXIETY QUESTIONNAIRES
6. BECOMING EASILY ANNOYED OR IRRITABLE: 0
5. BEING SO RESTLESS THAT IT IS HARD TO SIT STILL: 0
7. FEELING AFRAID AS IF SOMETHING AWFUL MIGHT HAPPEN: 0
1. FEELING NERVOUS, ANXIOUS, OR ON EDGE: 0
IF YOU CHECKED OFF ANY PROBLEMS ON THIS QUESTIONNAIRE, HOW DIFFICULT HAVE THESE PROBLEMS MADE IT FOR YOU TO DO YOUR WORK, TAKE CARE OF THINGS AT HOME, OR GET ALONG WITH OTHER PEOPLE: NOT DIFFICULT AT ALL
2. NOT BEING ABLE TO STOP OR CONTROL WORRYING: 0
4. TROUBLE RELAXING: 0
3. WORRYING TOO MUCH ABOUT DIFFERENT THINGS: 0
GAD7 TOTAL SCORE: 0

## 2022-06-15 ASSESSMENT — ENCOUNTER SYMPTOMS
COUGH: 1
BACK PAIN: 1

## 2022-06-15 NOTE — PROGRESS NOTES
[address] [place] [address]  _______________________________________  Marija Youssef MD                 [address] [place] [address],  [address]. [place], [address] [place] [address]                     [address]helsey [address]                                                                                    Phone: [phone]                                                                                    Fax: [phone]    Danika Feldman is a 47 y.o. male who is seen for evaluation of   Chief Complaint   Patient presents with    Concern For COVID-19       HPI:   Cough  Episode onset: Patient with cough and fever congestion chills and body aches diagnosed with COVID infection on May 27, out of work and through June 7, needs return to work paperwork filled out today. Mental Health Problem  Episode onset: Chronic depression stable despite recent infection, needs refills on meds. Hypertension  This is a chronic problem. Progression since onset: Chronic hypertension continue medication without side effects or problems needs refills recheck labs. Chief Complaint   Patient presents with    Concern For COVID-19         Review of Systems:    Review of Systems   Respiratory: Positive for cough. Cough is slowly resolving   Musculoskeletal: Positive for back pain. Chronic back pain no change       History:  Past Medical History:   Diagnosis Date    Borderline high cholesterol     controlled with medication    Chronic pain     Depression, major, in remission ([address] 8/25/2015    GERD (gastroesophageal reflux disease)     GERD (gastroesophageal reflux disease)     controlled with medication    Hypercholesterolemia     Hypertension     controlled with medication    Morbid obesity ([address]     BMI- 35.2  (9/20/18)- verbal    Sleep apnea     BMI- 35.        Past Surgical History:   Procedure Laterality Date    MS ABDOMEN SURGERY PROC UNLISTED  2012    umb hernia       Family History   Problem Relation Age of Onset    Delayed Awakening Neg Hx     Post-op Nausea/Vomiting Neg Hx     Hypertension Mother     Pseudochol. Deficiency Neg Hx     Post-op Cognitive Dysfunction Neg Hx     Hypertension Father     Malig Hypertherm Neg Hx     Emergence Delirium Neg Hx     Hypertension Sister     Other Neg Hx        Social History     Tobacco Use    Smoking status: Never Smoker    Smokeless tobacco: Never Used   Substance Use Topics    Alcohol use: No       No Known Allergies    Current Outpatient Medications   Medication Sig Dispense Refill    amLODIPine-benazepril (LOTREL) 10-20 MG per capsule Take 1 capsule by mouth daily      benazepril (LOTENSIN) 20 MG tablet Take 1 tablet by mouth      chlorthalidone (HYGROTON) 25 MG tablet Take 25 mg by mouth daily      DULoxetine (CYMBALTA) 30 MG extended release capsule Take 30 mg by mouth daily      lansoprazole (PREVACID) 30 MG delayed release capsule Takes every morning. Indications: gastroesophageal reflux disease      meloxicam (MOBIC) 15 MG tablet Take 15 mg by mouth daily      pravastatin (PRAVACHOL) 40 MG tablet Take 40 mg by mouth       No current facility-administered medications for this visit. Vitals:    /70   Ht 5' 11\" (1.803 m)   Wt 241 lb (109.3 kg)   BMI 33.61 kg/m²     Physical Exam:  Physical Exam  Vitals and nursing note reviewed. Constitutional:       Appearance: Normal appearance. He is not ill-appearing or diaphoretic. HENT:      Head: Normocephalic and atraumatic. Nose: Nose normal.   Eyes:      Pupils: Pupils are equal, round, and reactive to light. Cardiovascular:      Rate and Rhythm: Normal rate and regular rhythm. Pulses: Normal pulses. Heart sounds: Normal heart sounds. Pulmonary:      Effort: Pulmonary effort is normal.      Breath sounds: Normal breath sounds. Musculoskeletal:         General: No swelling or tenderness. Normal range of motion. Cervical back: Normal range of motion and neck supple.    Skin: General: Skin is warm and dry. Findings: No erythema or rash. Neurological:      General: No focal deficit present. Mental Status: He is alert and oriented to person, place, and time. Mental status is at baseline. Psychiatric:         Mood and Affect: Mood normal.         Assessment/Plan:     ICD-10-CM    1. COVID-19  U07.1      Patient with COVID recently, now recovered, needs work forms completed, hypertension also controlled, denies any side effects. Depression doing well with Suboxone, he is been out of work according to forms attached and scanned.   Cleared to return to work as of June 8, call if other problems    [place]lan[place] MD

## 2022-07-05 ENCOUNTER — OFFICE VISIT (OUTPATIENT)
Dept: FAMILY MEDICINE CLINIC | Facility: CLINIC | Age: 52
End: 2022-07-05
Payer: COMMERCIAL

## 2022-07-05 VITALS
BODY MASS INDEX: 33.46 KG/M2 | HEIGHT: 71 IN | DIASTOLIC BLOOD PRESSURE: 80 MMHG | WEIGHT: 239 LBS | SYSTOLIC BLOOD PRESSURE: 127 MMHG

## 2022-07-05 DIAGNOSIS — M54.42 CHRONIC BILATERAL LOW BACK PAIN WITH BILATERAL SCIATICA: ICD-10-CM

## 2022-07-05 DIAGNOSIS — E78.2 HYPERLIPEMIA, MIXED: ICD-10-CM

## 2022-07-05 DIAGNOSIS — R73.9 HIGH BLOOD SUGAR: ICD-10-CM

## 2022-07-05 DIAGNOSIS — R53.82 CHRONIC FATIGUE: ICD-10-CM

## 2022-07-05 DIAGNOSIS — M54.9 CHRONIC MID BACK PAIN: ICD-10-CM

## 2022-07-05 DIAGNOSIS — K21.9 GASTROESOPHAGEAL REFLUX DISEASE, UNSPECIFIED WHETHER ESOPHAGITIS PRESENT: ICD-10-CM

## 2022-07-05 DIAGNOSIS — G89.29 CHRONIC MID BACK PAIN: ICD-10-CM

## 2022-07-05 DIAGNOSIS — I10 HYPERTENSION, UNCONTROLLED: ICD-10-CM

## 2022-07-05 DIAGNOSIS — F32.5 DEPRESSION, MAJOR, IN REMISSION (HCC): ICD-10-CM

## 2022-07-05 DIAGNOSIS — G89.29 CHRONIC BILATERAL LOW BACK PAIN WITH BILATERAL SCIATICA: ICD-10-CM

## 2022-07-05 DIAGNOSIS — Z00.00 ROUTINE GENERAL MEDICAL EXAMINATION AT A HEALTH CARE FACILITY: Primary | ICD-10-CM

## 2022-07-05 DIAGNOSIS — M54.41 CHRONIC BILATERAL LOW BACK PAIN WITH BILATERAL SCIATICA: ICD-10-CM

## 2022-07-05 DIAGNOSIS — N52.9 ERECTILE DYSFUNCTION, UNSPECIFIED ERECTILE DYSFUNCTION TYPE: ICD-10-CM

## 2022-07-05 LAB
ALBUMIN SERPL-MCNC: 3.3 G/DL (ref 3.5–5)
ALBUMIN/GLOB SERPL: 1 {RATIO} (ref 1.2–3.5)
ALP SERPL-CCNC: 88 U/L (ref 50–136)
ALT SERPL-CCNC: 39 U/L (ref 12–65)
ANION GAP SERPL CALC-SCNC: 5 MMOL/L (ref 7–16)
AST SERPL-CCNC: 19 U/L (ref 15–37)
BASOPHILS # BLD: 0 K/UL (ref 0–0.2)
BASOPHILS NFR BLD: 0 % (ref 0–2)
BILIRUB SERPL-MCNC: 0.5 MG/DL (ref 0.2–1.1)
BUN SERPL-MCNC: 17 MG/DL (ref 6–23)
CALCIUM SERPL-MCNC: 9.3 MG/DL (ref 8.3–10.4)
CHLORIDE SERPL-SCNC: 105 MMOL/L (ref 98–107)
CHOLEST SERPL-MCNC: 161 MG/DL
CO2 SERPL-SCNC: 30 MMOL/L (ref 21–32)
CREAT SERPL-MCNC: 0.9 MG/DL (ref 0.8–1.5)
DIFFERENTIAL METHOD BLD: NORMAL
EOSINOPHIL # BLD: 0.1 K/UL (ref 0–0.8)
EOSINOPHIL NFR BLD: 1 % (ref 0.5–7.8)
ERYTHROCYTE [DISTWIDTH] IN BLOOD BY AUTOMATED COUNT: 14.5 % (ref 11.9–14.6)
GLOBULIN SER CALC-MCNC: 3.4 G/DL (ref 2.3–3.5)
GLUCOSE SERPL-MCNC: 98 MG/DL (ref 65–100)
HCT VFR BLD AUTO: 44.1 % (ref 41.1–50.3)
HDLC SERPL-MCNC: 52 MG/DL (ref 40–60)
HDLC SERPL: 3.1 {RATIO}
HGB BLD-MCNC: 14.3 G/DL (ref 13.6–17.2)
IMM GRANULOCYTES # BLD AUTO: 0 K/UL (ref 0–0.5)
IMM GRANULOCYTES NFR BLD AUTO: 0 % (ref 0–5)
LDLC SERPL CALC-MCNC: 93.2 MG/DL
LYMPHOCYTES # BLD: 2.2 K/UL (ref 0.5–4.6)
LYMPHOCYTES NFR BLD: 22 % (ref 13–44)
MCH RBC QN AUTO: 28.3 PG (ref 26.1–32.9)
MCHC RBC AUTO-ENTMCNC: 32.4 G/DL (ref 31.4–35)
MCV RBC AUTO: 87.3 FL (ref 79.6–97.8)
MONOCYTES # BLD: 0.6 K/UL (ref 0.1–1.3)
MONOCYTES NFR BLD: 6 % (ref 4–12)
NEUTS SEG # BLD: 6.9 K/UL (ref 1.7–8.2)
NEUTS SEG NFR BLD: 71 % (ref 43–78)
NRBC # BLD: 0 K/UL (ref 0–0.2)
PLATELET # BLD AUTO: 325 K/UL (ref 150–450)
PMV BLD AUTO: 10.1 FL (ref 9.4–12.3)
POTASSIUM SERPL-SCNC: 3.6 MMOL/L (ref 3.5–5.1)
PROT SERPL-MCNC: 6.7 G/DL (ref 6.3–8.2)
RBC # BLD AUTO: 5.05 M/UL (ref 4.23–5.6)
SODIUM SERPL-SCNC: 140 MMOL/L (ref 138–145)
TRIGL SERPL-MCNC: 79 MG/DL (ref 35–150)
TSH, 3RD GENERATION: 0.73 UIU/ML (ref 0.36–3.74)
VLDLC SERPL CALC-MCNC: 15.8 MG/DL (ref 6–23)
WBC # BLD AUTO: 9.9 K/UL (ref 4.3–11.1)

## 2022-07-05 PROCEDURE — 99396 PREV VISIT EST AGE 40-64: CPT | Performed by: FAMILY MEDICINE

## 2022-07-05 ASSESSMENT — ENCOUNTER SYMPTOMS
CHOKING: 0
CHEST TIGHTNESS: 0
APNEA: 0
COUGH: 0
EYE ITCHING: 0
BACK PAIN: 1
EYE PAIN: 0
EYE DISCHARGE: 0

## 2022-07-05 NOTE — PROGRESS NOTES
[address] [place] [address]  _______________________________________  Ananda Villatoro MD                 [address] [place] [address], [address]. MD Jonatan Elkinshaamy Chelsey 2                                                                                    Phone: [phone]                                                                                    Fax: [phone]    Mike Granados is a 47 y.o. male who is seen for evaluation of   Chief Complaint   Patient presents with    Back Pain     Muscle relaxer and meloxicam have not helped much with low back pain       HPI:   Back Pain  This is a chronic problem. Episode onset: flared again on 6/27 and had to call out of work since then, is improving but not ready for return to work yet, planning to return early next week. Other  Episode onset: here for CPX and routine annual labs, no new problems other than back pain flare as detailed elsewhere in this note. Associated symptoms include fatigue. Pertinent negatives include no chills, coughing or diaphoresis. Hyperlipidemia  This is a chronic problem. Episode onset: on mds, needs refills and labs fasting. Hypertension  This is a chronic problem. Progression since onset: ongoing issues with BP, controlled at present, need refills and labs. Mental Health Problem  Episode onset: chronic depression and more recently with increase anxiety, still on cymbalta, frustrated about his health and issues wiht access through [place]. Additional symptoms of the illness include fatigue. Additional symptoms of the illness do not include appetite change. Gastroesophageal Reflux  He reports no choking or no coughing. Episode onset: GERD sx controlled on med, neeed refills and labs. Associated symptoms include fatigue.       Chief Complaint   Patient presents with    Back Pain     Muscle relaxer and meloxicam have not helped much with low back pain         Review of Systems:    Review of Systems Constitutional: Positive for fatigue. Negative for activity change, appetite change, chills and diaphoresis. Eyes: Negative for pain, discharge and itching. Respiratory: Negative for apnea, cough, choking and chest tightness. Genitourinary: Negative for difficulty urinating, flank pain, frequency and penile discharge. Musculoskeletal: Positive for back pain. History:  Past Medical History:   Diagnosis Date    Borderline high cholesterol     controlled with medication    Chronic pain     [facility]2015    GERD (gastroesophageal reflux disease)     GERD (gastroesophageal reflux disease)     controlled with medication    Hypercholesterolemia     Hypertension     controlled with medication    Morbid obesity ([facility]     BMI- 35.2  (9/20/18)- verbal    Sleep apnea     BMI- 35. Past Surgical History:   Procedure Laterality Date    [place] ABDOMEN SURGERY PROC UNLISTED  2012    umb hernia       Family History   Problem Relation Age of Onset    Delayed Awakening Neg Hx     Post-op Nausea/Vomiting Neg Hx     Hypertension Mother     Pseudochol. Deficiency Neg Hx     Post-op Cognitive Dysfunction Neg Hx     Hypertension Father     Malig Hypertherm Neg Hx     Emergence Delirium Neg Hx     Hypertension Sister     Other Neg Hx        Social History     Tobacco Use    Smoking status: Never Smoker    Smokeless tobacco: Never Used   Substance Use Topics    Alcohol use: No       No Known Allergies    Current Outpatient Medications   Medication Sig Dispense Refill    amLODIPine-benazepril (LOTREL) 10-20 MG per capsule Take 1 capsule by mouth daily      benazepril (LOTENSIN) 20 MG tablet Take 1 tablet by mouth      chlorthalidone (HYGROTON) 25 MG tablet Take 25 mg by mouth daily      DULoxetine (CYMBALTA) 30 MG extended release capsule Take 30 mg by mouth daily      lansoprazole (PREVACID) 30 MG delayed release capsule Takes every morning.  Indications: gastroesophageal reflux disease      meloxicam (MOBIC) 15 MG tablet Take 15 mg by mouth daily      pravastatin (PRAVACHOL) 40 MG tablet Take 40 mg by mouth       No current facility-administered medications for this visit. Vitals:    /80 (Site: Right Upper Arm, Position: Sitting, Cuff Size: Large Adult)   Ht 5' 11\" (1.803 m)   Wt 239 lb (108.4 kg)   BMI 33.33 kg/m²     Physical Exam:  Physical Exam  Vitals and nursing note reviewed. Constitutional:       Appearance: Normal appearance. He is obese. He is not ill-appearing or diaphoretic. HENT:      Head: Normocephalic and atraumatic. Nose: Nose normal.   Eyes:      Pupils: Pupils are equal, round, and reactive to light. Cardiovascular:      Rate and Rhythm: Normal rate and regular rhythm. Pulses: Normal pulses. Heart sounds: Normal heart sounds. Pulmonary:      Effort: Pulmonary effort is normal.      Breath sounds: Normal breath sounds. Musculoskeletal:         General: Tenderness present. No swelling. Normal range of motion. Cervical back: Normal range of motion and neck supple. Comments: Pain in mid and lower back, reduced ROM for flex/extend/ rotate, etc.    Skin:     General: Skin is warm and dry. Findings: No erythema or rash. Neurological:      General: No focal deficit present. Mental Status: He is alert and oriented to person, place, and time. Mental status is at baseline. Psychiatric:         Mood and Affect: Mood normal.         Assessment/Plan:     ICD-10-CM    1. Routine general medical examination at a health care facility  Z00.00 CBC with Auto Differential     Comprehensive Metabolic Panel     Lipid Panel     PSA, Total and Free     PSA, Total and Free     Lipid Panel     Comprehensive Metabolic Panel     CBC with Auto Differential   2. Chronic bilateral low back pain with bilateral sciatica  M54.42     M54.41     G89.29    3. Depression, major[facility] ([facility]  F32.5    4.  Chronic mid back pain  M54.9     G89.29    5. Hypertension, uncontrolled  I10 CBC with Auto Differential     Comprehensive Metabolic Panel     Comprehensive Metabolic Panel     CBC with Auto Differential   6. Hyperlipemia, mixed  E78.2 Lipid Panel     Lipid Panel   7. Erectile dysfunction, unspecified erectile dysfunction type  N52.9    8. Gastroesophageal reflux disease, unspecified whether esophagitis present  K21.9    9. High blood sugar  R73.9 Hemoglobin A1C     Hemoglobin A1C   10. Chronic fatigue  R53.82 TSH     TSH     Routine Care and counseling as appropriate for age and gender. Routine labs pending as needed based on age and gender. Avoid high risk behaviors encouraged. Encourage weight maintenance  Encourage healthy diet, exercise and lifestyle choices. Follow up as scheduled. Pt also for recheck on his chronic back pain / recurrent back pain issues, has missed another rweek of work, see scanned medical leave documents for details. These forms completed in office today for absence from 6/28-7/10-22, return on 7/11/22 with no restrictions.  He is trying to find a different position with his company that is not so demanding physically          Halley Dorman MD

## 2022-07-06 LAB
EST. AVERAGE GLUCOSE BLD GHB EST-MCNC: 126 MG/DL
HBA1C MFR BLD: 6 % (ref 4.8–5.6)

## 2022-07-09 LAB
PSA FREE MFR SERPL: 50 %
PSA FREE SERPL-MCNC: 0.1 NG/ML
PSA SERPL-MCNC: 0.2 NG/ML

## 2022-07-18 RX ORDER — AMLODIPINE BESYLATE AND BENAZEPRIL HYDROCHLORIDE 10; 20 MG/1; MG/1
1 CAPSULE ORAL DAILY
Qty: 30 CAPSULE | Refills: 5 | Status: SHIPPED | OUTPATIENT
Start: 2022-07-18

## 2022-07-18 RX ORDER — PRAVASTATIN SODIUM 40 MG
40 TABLET ORAL DAILY
Qty: 30 TABLET | Refills: 5 | Status: SHIPPED | OUTPATIENT
Start: 2022-07-18

## 2022-07-18 RX ORDER — BENAZEPRIL HYDROCHLORIDE 20 MG/1
20 TABLET ORAL DAILY
Qty: 30 TABLET | Refills: 5 | Status: SHIPPED | OUTPATIENT
Start: 2022-07-18 | End: 2022-10-19

## 2022-07-18 RX ORDER — CHLORTHALIDONE 25 MG/1
25 TABLET ORAL DAILY
Qty: 30 TABLET | Refills: 5 | Status: SHIPPED | OUTPATIENT
Start: 2022-07-18

## 2022-07-18 RX ORDER — DULOXETIN HYDROCHLORIDE 30 MG/1
30 CAPSULE, DELAYED RELEASE ORAL DAILY
Qty: 30 CAPSULE | Refills: 5 | Status: SHIPPED | OUTPATIENT
Start: 2022-07-18 | End: 2022-10-05

## 2022-07-18 RX ORDER — LANSOPRAZOLE 30 MG/1
30 CAPSULE, DELAYED RELEASE ORAL DAILY
Qty: 30 CAPSULE | Refills: 5 | Status: SHIPPED | OUTPATIENT
Start: 2022-07-18

## 2022-07-18 RX ORDER — MELOXICAM 15 MG/1
15 TABLET ORAL DAILY
Qty: 30 TABLET | Refills: 5 | Status: SHIPPED | OUTPATIENT
Start: 2022-07-18

## 2022-07-28 ENCOUNTER — OFFICE VISIT (OUTPATIENT)
Dept: FAMILY MEDICINE CLINIC | Facility: CLINIC | Age: 52
End: 2022-07-28
Payer: COMMERCIAL

## 2022-07-28 VITALS
BODY MASS INDEX: 33.6 KG/M2 | HEIGHT: 71 IN | WEIGHT: 240 LBS | SYSTOLIC BLOOD PRESSURE: 134 MMHG | DIASTOLIC BLOOD PRESSURE: 78 MMHG

## 2022-07-28 DIAGNOSIS — F32.5 DEPRESSION, MAJOR, IN REMISSION (HCC): ICD-10-CM

## 2022-07-28 DIAGNOSIS — I10 HYPERTENSION, UNCONTROLLED: ICD-10-CM

## 2022-07-28 DIAGNOSIS — Z20.822 CLOSE EXPOSURE TO COVID-19 VIRUS: Primary | ICD-10-CM

## 2022-07-28 LAB
EXP DATE SOLUTION: NORMAL
EXP DATE SWAB: NORMAL
LOT NUMBER SOLUTION: NORMAL
LOT NUMBER SWAB: NORMAL
SARS-COV-2 RNA, POC: NEGATIVE

## 2022-07-28 PROCEDURE — 87635 SARS-COV-2 COVID-19 AMP PRB: CPT | Performed by: FAMILY MEDICINE

## 2022-07-28 PROCEDURE — 99214 OFFICE O/P EST MOD 30 MIN: CPT | Performed by: FAMILY MEDICINE

## 2022-07-28 ASSESSMENT — PATIENT HEALTH QUESTIONNAIRE - PHQ9
SUM OF ALL RESPONSES TO PHQ QUESTIONS 1-9: 0
2. FEELING DOWN, DEPRESSED OR HOPELESS: 0
SUM OF ALL RESPONSES TO PHQ QUESTIONS 1-9: 0
1. LITTLE INTEREST OR PLEASURE IN DOING THINGS: 0
SUM OF ALL RESPONSES TO PHQ9 QUESTIONS 1 & 2: 0

## 2022-07-28 NOTE — PROGRESS NOTES
[address] [place] [address]  _______________________________________  Em Wood MD                 [address] [place] [address] Drive, [address]. [place], [facility], [place]sey Jacinto                                                                                    Phone: [phone]                                                                                    Fax: [phone]    Subjective:  Lorena Koch is a 47 y. o.year old male who presents with complaints of sinus pressure and drainage, low grade fever, frontal headache, sore throat, and postnasal drip for the last few days. Daughter has covid and was home this weekend, [facility] tested and said he was negative. Told till quarantine till Sunday from work. Using Mucinex DM and delsym for sx. HTN: on meds, needs refills today, good contreol noted. Depression: stressed about exposure but no SI, no HI, overall doing well    Allergies:  No Known Allergies    Medications:  Current Outpatient Medications   Medication Sig Dispense Refill    amLODIPine-benazepril (LOTREL) 10-20 MG per capsule Take 1 capsule by mouth in the morning. 30 capsule 5    benazepril (LOTENSIN) 20 MG tablet Take 1 tablet by mouth in the morning. 30 tablet 5    chlorthalidone (HYGROTON) 25 MG tablet Take 1 tablet by mouth in the morning. 30 tablet 5    DULoxetine (CYMBALTA) 30 MG extended release capsule Take 1 capsule by mouth in the morning. 30 capsule 5    lansoprazole (PREVACID) 30 MG delayed release capsule Take 1 capsule by mouth in the morning. Takes every morning. Indications: gastroesophageal reflux disease. 30 capsule 5    meloxicam (MOBIC) 15 MG tablet Take 1 tablet by mouth in the morning. 30 tablet 5    pravastatin (PRAVACHOL) 40 MG tablet Take 1 tablet by mouth in the morning. 30 tablet 5     No current facility-administered medications for this visit.        Objective:  /78   Ht 5' 11\" (1.803 m)   Wt 240 lb (108.9 kg)   BMI 33.47 kg/m²   HEENT: Thick mucous in the nose and throat bilaterally with postnasal drip noted. Anterior nodes tender and mildly swollen. Cardiovascular: Regular rate and rhythm. Chest:  Clear to auscultation. Covid test negative here    Assessment:    ICD-10-CM    1. Close exposure to COVID-19 virus  Z20.822 AMB POC COVID-19 COV      2. Depression, major, [facility] ([facility]  F32.5       3. Hypertension, uncontrolled  I10           Plan:  1. Meds sent  2. Increase fluids and rest.  3. Call if problems worsen or do not resolve in the next few days.   Continue  meds for BP and also depression    Cecelia Richards MD

## 2022-08-29 ENCOUNTER — OFFICE VISIT (OUTPATIENT)
Dept: FAMILY MEDICINE CLINIC | Facility: CLINIC | Age: 52
End: 2022-08-29
Payer: COMMERCIAL

## 2022-08-29 VITALS
WEIGHT: 242 LBS | BODY MASS INDEX: 33.88 KG/M2 | DIASTOLIC BLOOD PRESSURE: 78 MMHG | HEIGHT: 71 IN | SYSTOLIC BLOOD PRESSURE: 136 MMHG

## 2022-08-29 DIAGNOSIS — G89.29 CHRONIC MID BACK PAIN: ICD-10-CM

## 2022-08-29 DIAGNOSIS — M54.41 CHRONIC BILATERAL LOW BACK PAIN WITH BILATERAL SCIATICA: Primary | ICD-10-CM

## 2022-08-29 DIAGNOSIS — E78.2 HYPERLIPEMIA, MIXED: ICD-10-CM

## 2022-08-29 DIAGNOSIS — M25.511 CHRONIC RIGHT SHOULDER PAIN: ICD-10-CM

## 2022-08-29 DIAGNOSIS — N52.9 ERECTILE DYSFUNCTION, UNSPECIFIED ERECTILE DYSFUNCTION TYPE: ICD-10-CM

## 2022-08-29 DIAGNOSIS — F32.5 DEPRESSION, MAJOR, IN REMISSION (HCC): ICD-10-CM

## 2022-08-29 DIAGNOSIS — M54.9 CHRONIC MID BACK PAIN: ICD-10-CM

## 2022-08-29 DIAGNOSIS — I10 ESSENTIAL (PRIMARY) HYPERTENSION: ICD-10-CM

## 2022-08-29 DIAGNOSIS — M54.42 CHRONIC BILATERAL LOW BACK PAIN WITH BILATERAL SCIATICA: Primary | ICD-10-CM

## 2022-08-29 DIAGNOSIS — G89.29 CHRONIC RIGHT SHOULDER PAIN: ICD-10-CM

## 2022-08-29 DIAGNOSIS — G89.29 CHRONIC BILATERAL LOW BACK PAIN WITH BILATERAL SCIATICA: Primary | ICD-10-CM

## 2022-08-29 PROCEDURE — 99214 OFFICE O/P EST MOD 30 MIN: CPT | Performed by: FAMILY MEDICINE

## 2022-08-29 ASSESSMENT — ENCOUNTER SYMPTOMS
RESPIRATORY NEGATIVE: 1
ABDOMINAL DISTENTION: 0
ABDOMINAL PAIN: 0
BACK PAIN: 1

## 2022-08-29 ASSESSMENT — PATIENT HEALTH QUESTIONNAIRE - PHQ9
SUM OF ALL RESPONSES TO PHQ QUESTIONS 1-9: 0
SUM OF ALL RESPONSES TO PHQ QUESTIONS 1-9: 0
2. FEELING DOWN, DEPRESSED OR HOPELESS: 0
SUM OF ALL RESPONSES TO PHQ9 QUESTIONS 1 & 2: 0
1. LITTLE INTEREST OR PLEASURE IN DOING THINGS: 0
SUM OF ALL RESPONSES TO PHQ QUESTIONS 1-9: 0
SUM OF ALL RESPONSES TO PHQ QUESTIONS 1-9: 0

## 2022-08-29 NOTE — PROGRESS NOTES
[address] [place] [address]  _______________________________________  Jamie Haro MD                 [address] [place] [address],  [address]. [place], [address] [place] [address]                     Chelsey Agosto [address]                                                                                    Phone: [phone]                                                                                    Fax: [phone]    Terrance Morocho is a 47 y.o. male who is seen for evaluation of   Chief Complaint   Patient presents with    Back Pain     Upper and lower back    Shoulder Pain     Right shoulder       HPI:   Back Pain  This is a recurrent problem. Episode frequency: Reoccurrence of lower back pain with bilateral sciatica. Patient has stiffness, he has anti-inflammatories and muscle relaxers at home. He will start a low-dose as well as home exercises. He is to be out of work, cant stand, sit, squat, bend, lift. Pertinent negatives include no abdominal pain. Shoulder Pain   History of extremity trauma: Chronic left shoulder pain, no recall of injury, pain present for over a year. Has seen orthopedics for pain similar on the right side. Needs x-rays and referral.   Hypertension  This is a chronic problem. The current episode started more than 1 year ago. The problem is unchanged. Condition status: Despite his pain, blood pressure is controlled on medication without breakthrough needs refills. Pertinent negatives include no neck pain. Gastroesophageal Reflux  He reports no abdominal pain. Chronicity: Acid reflux symptoms controlled medication needs refills recheck labs. Chief Complaint   Patient presents with    Back Pain     Upper and lower back    Shoulder Pain     Right shoulder         Review of Systems:    Review of Systems   Constitutional:  Negative for activity change, appetite change, chills and diaphoresis. Respiratory: Negative. Gastrointestinal:  Negative for abdominal distention and abdominal pain. Genitourinary: Negative. Musculoskeletal:  Positive for arthralgias, back pain and joint swelling. Negative for myalgias, neck pain and neck stiffness. History:  Past Medical History:   Diagnosis Date    Borderline high cholesterol     controlled with medication    Chronic pain     Depression, major, in remission ([facility] 8/25/2015    GERD (gastroesophageal reflux disease)     GERD (gastroesophageal reflux disease)     controlled with medication    Hypercholesterolemia     Hypertension     controlled with medication    Morbid obesity ([facility]     BMI- 35.2  (9/20/18)- verbal    Sleep apnea     BMI- 35. Past Surgical History:   Procedure Laterality Date    [place] ABDOMEN SURGERY PROC UNLISTED  2012    umb hernia       Family History   Problem Relation Age of Onset    Delayed Awakening Neg Hx     Post-op Nausea/Vomiting Neg Hx     Hypertension Mother     Pseudochol. Deficiency Neg Hx     Post-op Cognitive Dysfunction Neg Hx     Hypertension Father     Malig Hypertherm Neg Hx     Emergence Delirium Neg Hx     Hypertension Sister     Other Neg Hx        Social History     Tobacco Use    Smoking status: Never    Smokeless tobacco: Never   Substance Use Topics    Alcohol use: No       No Known Allergies    Current Outpatient Medications   Medication Sig Dispense Refill    amLODIPine-benazepril (LOTREL) 10-20 MG per capsule Take 1 capsule by mouth in the morning. 30 capsule 5    benazepril (LOTENSIN) 20 MG tablet Take 1 tablet by mouth in the morning. 30 tablet 5    chlorthalidone (HYGROTON) 25 MG tablet Take 1 tablet by mouth in the morning. 30 tablet 5    DULoxetine (CYMBALTA) 30 MG extended release capsule Take 1 capsule by mouth in the morning. 30 capsule 5    lansoprazole (PREVACID) 30 MG delayed release capsule Take 1 capsule by mouth in the morning. Takes every morning. Indications: gastroesophageal reflux disease. 30 capsule 5    meloxicam (MOBIC) 15 MG tablet Take 1 tablet by mouth in the morning.  30 tablet 5 pravastatin (PRAVACHOL) 40 MG tablet Take 1 tablet by mouth in the morning. 30 tablet 5     No current facility-administered medications for this visit. Vitals:    /78   Ht 5' 11\" (1.803 m)   Wt 242 lb (109.8 kg)   BMI 33.75 kg/m²     Physical Exam:  Physical Exam  Vitals and nursing note reviewed. Constitutional:       Appearance: Normal appearance. He is obese. He is not ill-appearing or diaphoretic. HENT:      Head: Normocephalic and atraumatic. Nose: Nose normal.   Eyes:      Pupils: Pupils are equal, round, and reactive to light. Cardiovascular:      Rate and Rhythm: Normal rate and regular rhythm. Pulses: Normal pulses. Heart sounds: Normal heart sounds. Pulmonary:      Effort: Pulmonary effort is normal.      Breath sounds: Normal breath sounds. Musculoskeletal:         General: Tenderness present. No swelling. Cervical back: Normal range of motion and neck supple. Comments: Pain to palpation lower lumbar spine bilaterally, straight leg raise reproduces his bilateral sciatic pain. Also has reduced range of motion of the left shoulder secondary to pain active or passive movement   Skin:     General: Skin is warm and dry. Findings: No erythema or rash. Neurological:      General: No focal deficit present. Mental Status: He is alert and oriented to person, place, and time. Mental status is at baseline. Psychiatric:         Mood and Affect: Mood normal.       Assessment/Plan:     ICD-10-CM    1. Chronic bilateral low back pain with bilateral sciatica  M54.42     M54.41     G89.29       2. Chronic mid back pain  M54.9     G89.29       3. Hyperlipemia, mixed  E78.2       4. Depression, major[facility] ([facility]  F32.5       5. Essential (primary) hypertension  I10       6. Erectile dysfunction, unspecified erectile dysfunction type  N52.9       7.  Chronic right shoulder pain  M25.511 Ambulatory referral to Orthopedic Surgery    G89.29 XR SHOULDER RIGHT (MIN 2 VIEWS)        Chronic right shoulder pain, not getting better, needs referral to orthopedics, x-ray ordered, orthopedist will have to decide if MRI is necessary. Chronic low back pain, had a flare this past weekend. Not able to operate his machine or stand at his place at work. Needs FMLA short-term paperwork filled out to be out of work this week    Hypertension: Stable, continue meds    medicines sent and pending as appropriate  Encourage low salt diet with exercise as tolerated  Encourage weight control efforts to reduce overall health risks in future  Encourage monitor BP at home   Recheck in 6 months or as needed for other problems.      [place] MD Ara

## 2022-08-31 ENCOUNTER — OFFICE VISIT (OUTPATIENT)
Dept: ORTHOPEDIC SURGERY | Age: 52
End: 2022-08-31
Payer: COMMERCIAL

## 2022-08-31 VITALS — HEIGHT: 70 IN | BODY MASS INDEX: 35.02 KG/M2 | WEIGHT: 244.6 LBS

## 2022-08-31 DIAGNOSIS — M54.2 NECK PAIN: Primary | ICD-10-CM

## 2022-08-31 DIAGNOSIS — M25.511 ACUTE PAIN OF RIGHT SHOULDER: ICD-10-CM

## 2022-08-31 DIAGNOSIS — R20.0 NUMBNESS AND TINGLING OF RIGHT UPPER EXTREMITY: ICD-10-CM

## 2022-08-31 DIAGNOSIS — R20.2 NUMBNESS AND TINGLING OF RIGHT UPPER EXTREMITY: ICD-10-CM

## 2022-08-31 PROCEDURE — 99204 OFFICE O/P NEW MOD 45 MIN: CPT | Performed by: NURSE PRACTITIONER

## 2022-08-31 NOTE — PROGRESS NOTES
Name: Sherri Burton  YOB: 1970  Gender: male  MRN: [id number]    CC:   Chief Complaint   Patient presents with    Neck Pain    Shoulder Pain       HPI:  Sherri Burton is a 47 y.o. male who presents with a 3-week history of right shoulder pain. Pain is directly near the acromioclavicular joint. He has been doing a new job with a lot of movement and repetitive motion of his right shoulder. He has had left shoulder surgery with Dr. Yvette Ulrich. Patient does have chronic numbness and tingling in his right upper extremity. He really cannot tell me if this is related to increase of his shoulder pain. Patient had nerve conduction studies in 2017 which revealed early borderline neuropathy. He also had an MRI of the thoracic spine in 2017 which did reveal some disc bulging at C5-7. He is currently in pain management in regards to his lower spine complaints through the [facility]. The predominant location of the pain is in the anterior shoulder area. There is also some associated neck and periscapular pain, but there is no radiation of pain distal to the upper arm. There is no associated numbness or weakness in the upper extremities, other than the weakness of the shoulder. He describes the quality of the pain as a deep ache. The patient has not noticed changes in hand writing and fine motor skills. He symptoms seem to be aggravated by shoulder range of motion such as elevating arms overhead or reaching across the body. The symptoms are somewhat alleviated with rest of the shoulder. Thus far, he has tried NSAIDS, muscle relaxers, and gabapentin or lyrica           AMB PAIN ASSESSMENT 8/31/2022   Location Modifiers Right   Severity of Pain 4   Frequency of Pain Intermittent   Limiting Behavior Yes   Result of Injury No   Work-Related Injury No   Are there other pain locations you wish to document? No        ROS/Meds/PSH/PMH/FH/SH: I personally reviewed the patient's collected intake data.   Below are the pertinents:    No Known Allergies    Current Outpatient Medications:     diclofenac sodium (VOLTAREN) 1 % GEL, Apply 4 g topically 4 times daily as needed for Pain, Disp: 150 g, Rfl: 2    amLODIPine-benazepril (LOTREL) 10-20 MG per capsule, Take 1 capsule by mouth in the morning., Disp: 30 capsule, Rfl: 5    chlorthalidone (HYGROTON) 25 MG tablet, Take 1 tablet by mouth in the morning., Disp: 30 tablet, Rfl: 5    lansoprazole (PREVACID) 30 MG delayed release capsule, Take 1 capsule by mouth in the morning. Takes every morning. Indications: gastroesophageal reflux disease., Disp: 30 capsule, Rfl: 5    meloxicam (MOBIC) 15 MG tablet, Take 1 tablet by mouth in the morning., Disp: 30 tablet, Rfl: 5    pravastatin (PRAVACHOL) 40 MG tablet, Take 1 tablet by mouth in the morning., Disp: 30 tablet, Rfl: 5    benazepril (LOTENSIN) 20 MG tablet, Take 1 tablet by mouth in the morning. (Patient not taking: Reported on 8/31/2022), Disp: 30 tablet, Rfl: 5    DULoxetine (CYMBALTA) 30 MG extended release capsule, Take 1 capsule by mouth in the morning. (Patient not taking: Reported on 8/31/2022), Disp: 30 capsule, Rfl: 5  Patient Active Problem List   Diagnosis    Erectile dysfunction    GERD (gastroesophageal reflux disease)    Hyperlipemia, mixed    Depression, major, in remission ([facility]    Chronic lower back pain    Hypertension    Chronic mid back pain    Hypertension, uncontrolled       Tobacco:  reports that he has never smoked. He has never used smokeless tobacco.  Alcohol:   Social History     Substance and Sexual Activity   Alcohol Use No        Physical Exam:   @VS1@  GENERAL: Well developed, well-nourished adult in no acute distress. Patient is appropriately conversant  NECK:  Examination of the cervical spine reveals no evidence of sagittal or coronal plane deformity. There is expected cervical range of motion. There is no significant tenderness to palpation along the paraspinal musculature.   [place] is told him I will start with some topical Voltaren gel and I will refer him to sports medicine. He will start a home exercise program of the cervical spine. Follow-up with me in 6 weeks and if no improvement then we will get an MRI of the cervical spine as well. I discussed with him that these symptoms are will often respond to conservative efforts even though it may take an extended period of time for resolution. Conservative options include physical therapy, nonsteroidal anti-inflammatories, and shoulder injections. Ultimately, referral to a shoulder surgeon and subsequent surgical procedure may be required. Orders Placed This Encounter   Medications    diclofenac sodium (VOLTAREN) 1 % GEL     Sig: Apply 4 g topically 4 times daily as needed for Pain     Dispense:  150 g     Refill:  2        Orders Placed This Encounter   Procedures    XR CERVICAL SPINE (2-3 VIEWS)    [facility] [employer], Sports Medicine        4 This is a undiagnosed new problem with uncertain prognosis      Return in about 6 weeks (around 10/12/2022), or with Tulio Higginbotham, refer to sports med for right shoulder. RACQUEL Abrams - CNP  08/31/22      Elements of this note were created using speech recognition software. As such, errors of speech recognition may be present.

## 2022-09-27 ENCOUNTER — OFFICE VISIT (OUTPATIENT)
Dept: ORTHOPEDIC SURGERY | Age: 52
End: 2022-09-27
Payer: COMMERCIAL

## 2022-09-27 DIAGNOSIS — M19.011 ARTHRITIS OF RIGHT ACROMIOCLAVICULAR JOINT: Primary | ICD-10-CM

## 2022-09-27 DIAGNOSIS — E66.01 SEVERE OBESITY (BMI 35.0-39.9) WITH COMORBIDITY (HCC): ICD-10-CM

## 2022-09-27 DIAGNOSIS — M25.511 ACUTE PAIN OF RIGHT SHOULDER: ICD-10-CM

## 2022-09-27 PROCEDURE — 20605 DRAIN/INJ JOINT/BURSA W/O US: CPT | Performed by: ORTHOPAEDIC SURGERY

## 2022-09-27 PROCEDURE — 99203 OFFICE O/P NEW LOW 30 MIN: CPT | Performed by: ORTHOPAEDIC SURGERY

## 2022-09-27 RX ORDER — TRIAMCINOLONE ACETONIDE 40 MG/ML
40 INJECTION, SUSPENSION INTRA-ARTICULAR; INTRAMUSCULAR ONCE
Status: COMPLETED | OUTPATIENT
Start: 2022-09-27 | End: 2022-09-27

## 2022-09-27 RX ADMIN — TRIAMCINOLONE ACETONIDE 40 MG: 40 INJECTION, SUSPENSION INTRA-ARTICULAR; INTRAMUSCULAR at 09:58

## 2022-09-29 PROBLEM — M19.011 ARTHRITIS OF RIGHT ACROMIOCLAVICULAR JOINT: Status: ACTIVE | Noted: 2022-09-29

## 2022-10-05 ENCOUNTER — OFFICE VISIT (OUTPATIENT)
Dept: FAMILY MEDICINE CLINIC | Facility: CLINIC | Age: 52
End: 2022-10-05
Payer: COMMERCIAL

## 2022-10-05 VITALS
DIASTOLIC BLOOD PRESSURE: 70 MMHG | BODY MASS INDEX: 34.36 KG/M2 | WEIGHT: 240 LBS | HEIGHT: 70 IN | SYSTOLIC BLOOD PRESSURE: 110 MMHG

## 2022-10-05 DIAGNOSIS — R10.84 GENERALIZED ABDOMINAL PAIN: Primary | ICD-10-CM

## 2022-10-05 DIAGNOSIS — I10 HYPERTENSION, UNCONTROLLED: ICD-10-CM

## 2022-10-05 DIAGNOSIS — K29.01 ACUTE SUPERFICIAL GASTRITIS WITH HEMORRHAGE: ICD-10-CM

## 2022-10-05 DIAGNOSIS — K92.1 MELENA: ICD-10-CM

## 2022-10-05 PROCEDURE — 99214 OFFICE O/P EST MOD 30 MIN: CPT | Performed by: FAMILY MEDICINE

## 2022-10-05 RX ORDER — SUCRALFATE ORAL 1 G/10ML
1 SUSPENSION ORAL 3 TIMES DAILY
Qty: 414 ML | Refills: 1 | Status: SHIPPED | OUTPATIENT
Start: 2022-10-05 | End: 2022-10-19

## 2022-10-05 ASSESSMENT — ENCOUNTER SYMPTOMS
TENESMUS: 0
BACK PAIN: 0
EYE DISCHARGE: 0
DIARRHEA: 0
NAUSEA: 0
ODYNOPHAGIA: 0
VOMITING: 0
CONSTIPATION: 1
BLOATING: 1
RESPIRATORY NEGATIVE: 1
RECTAL PAIN: 0
ABDOMINAL PAIN: 1

## 2022-10-05 ASSESSMENT — PATIENT HEALTH QUESTIONNAIRE - PHQ9
2. FEELING DOWN, DEPRESSED OR HOPELESS: 0
SUM OF ALL RESPONSES TO PHQ QUESTIONS 1-9: 0
1. LITTLE INTEREST OR PLEASURE IN DOING THINGS: 0
SUM OF ALL RESPONSES TO PHQ9 QUESTIONS 1 & 2: 0

## 2022-10-05 NOTE — PROGRESS NOTES
[address] [place] [address]  _______________________________________  Jacqui Do MD                 [address] [place] [address],  [address]. [place], [address] [place] [address]                     Chelsey Agosto [address]                                                                                    Phone: [phone]                                                                                    Fax: [phone]    Franchesca Hernandez is a 47 y.o. male who is seen for evaluation of   Chief Complaint   Patient presents with    GI Problem     X 1 week, lower and upper stomach cramps, loose stools, black stool       HPI:   GI Problem  The primary symptoms include abdominal pain and melena. Primary symptoms do not include nausea, vomiting or diarrhea. The illness began 3 to 5 days ago. The problem has been gradually improving. The illness is also significant for bloating and constipation. The illness does not include chills, anorexia, dysphagia, odynophagia, tenesmus, back pain or itching. Associated symptoms comments: Patient reports intermittent abdominal pain with small amounts of stool output and that material is black for the last few days, yesterday and today is getting better, stool output returning to normal and normal color. Significant associated medical issues include GERD. Gastroesophageal Reflux  He complains of abdominal pain. He reports no dysphagia or no nausea. Chronicity: Patient with acid reflux normally controlled on medication, taking Prevacid daily, has upper epigastric discomfort. The problem has been gradually worsening. Associated symptoms include melena. Hypertension  This is a chronic problem. Progression since onset: Chronic hypertension controlled on medication needs refills today recheck labs.     Chief Complaint   Patient presents with    GI Problem     X 1 week, lower and upper stomach cramps, loose stools, black stool         Review of Systems:    Review of Systems   Constitutional:  Negative for chills. Eyes:  Negative for discharge. Respiratory: Negative. Gastrointestinal:  Positive for abdominal pain, bloating, constipation and melena. Negative for anorexia, diarrhea, dysphagia, nausea, rectal pain and vomiting. Endocrine: Negative. Genitourinary: Negative. Musculoskeletal:  Negative for back pain. Skin:  Negative for itching. History:  Past Medical History:   Diagnosis Date    Arthritis of right acromioclavicular joint 9/29/2022    Borderline high cholesterol     controlled with medication    Chronic pain     Depression, major, in remission ([facility] 8/25/2015    GERD (gastroesophageal reflux disease)     GERD (gastroesophageal reflux disease)     controlled with medication    Hypercholesterolemia     Hypertension     controlled with medication    Morbid obesity ([facility]     BMI- 35.2  (9/20/18)- verbal    Sleep apnea     BMI- 35. Past Surgical History:   Procedure Laterality Date    [place] ABDOMEN SURGERY PROC UNLISTED  2012    umb hernia       Family History   Problem Relation Age of Onset    Delayed Awakening Neg Hx     Post-op Nausea/Vomiting Neg Hx     Hypertension Mother     Pseudochol. Deficiency Neg Hx     Post-op Cognitive Dysfunction Neg Hx     Hypertension Father     Malig Hypertherm Neg Hx     Emergence Delirium Neg Hx     Hypertension Sister     Other Neg Hx        Social History     Tobacco Use    Smoking status: Never    Smokeless tobacco: Never   Substance Use Topics    Alcohol use: No       No Known Allergies    Current Outpatient Medications   Medication Sig Dispense Refill    sucralfate (CARAFATE) 1 GM/10ML suspension Take 10 mLs by mouth 3 times daily 30 minutes Before each meal 414 mL 1    diclofenac sodium (VOLTAREN) 1 % GEL Apply 4 g topically 4 times daily as needed for Pain 150 g 2    amLODIPine-benazepril (LOTREL) 10-20 MG per capsule Take 1 capsule by mouth in the morning.  30 capsule 5    benazepril (LOTENSIN) 20 MG tablet Take 1 tablet by mouth in the morning. 30 tablet 5    chlorthalidone (HYGROTON) 25 MG tablet Take 1 tablet by mouth in the morning. 30 tablet 5    lansoprazole (PREVACID) 30 MG delayed release capsule Take 1 capsule by mouth in the morning. Takes every morning. Indications: gastroesophageal reflux disease. 30 capsule 5    meloxicam (MOBIC) 15 MG tablet Take 1 tablet by mouth in the morning. 30 tablet 5    pravastatin (PRAVACHOL) 40 MG tablet Take 1 tablet by mouth in the morning. 30 tablet 5     No current facility-administered medications for this visit. Vitals:    /70 (Site: Right Upper Arm, Position: Sitting)   Ht 5' 10\" (1.778 m)   Wt 240 lb (108.9 kg)   BMI 34.44 kg/m²     Physical Exam:  Physical Exam  Vitals and nursing note reviewed. Constitutional:       Appearance: Normal appearance. He is obese. He is not ill-appearing or diaphoretic. HENT:      Head: Normocephalic and atraumatic. Nose: Nose normal.   Eyes:      Pupils: Pupils are equal, round, and reactive to light. Cardiovascular:      Rate and Rhythm: Normal rate and regular rhythm. Pulses: Normal pulses. Heart sounds: Normal heart sounds. Pulmonary:      Effort: Pulmonary effort is normal.      Breath sounds: Normal breath sounds. Abdominal:      General: There is no distension. Palpations: There is no mass. Tenderness: There is abdominal tenderness. There is no guarding or rebound. Hernia: No hernia is present. Musculoskeletal:         General: No swelling or tenderness. Normal range of motion. Cervical back: Normal range of motion and neck supple. Skin:     General: Skin is warm and dry. Findings: No erythema or rash. Neurological:      General: No focal deficit present. Mental Status: He is alert and oriented to person, place, and time. Mental status is at baseline. Psychiatric:         Mood and Affect: Mood normal.     Assessment/Plan:     ICD-10-CM    1.  Generalized abdominal pain  R10.84 sucralfate (CARAFATE) 1 GM/10ML suspension     CANCELED: CBC with Auto Differential     CANCELED: Comprehensive Metabolic Panel      2. Melena  K92.1 sucralfate (CARAFATE) 1 GM/10ML suspension     CANCELED: CBC with Auto Differential     CANCELED: Comprehensive Metabolic Panel      3. Hypertension, uncontrolled  I10       4. Acute superficial gastritis with hemorrhage  K29.01 sucralfate (CARAFATE) 1 GM/10ML suspension        Patient with abdominal pain for the last 4 to 5 days suspected to be acute gastritis. His wife and daughter had similar with some nausea. He denies any fever, has had black stools for the last several days but this appears to be resolving yesterday and today. Plan to continue Prevacid dosing daily and also add Carafate suspension 10 mL prior to each meal 3 times a day.     Hypertension continue medications, continue to monitor for control        Maude Arreola MD

## 2022-10-06 RX ORDER — SUCRALFATE 1 G/1
1 TABLET ORAL 4 TIMES DAILY
Qty: 120 TABLET | Refills: 3 | Status: SHIPPED | OUTPATIENT
Start: 2022-10-06

## 2022-10-06 NOTE — TELEPHONE ENCOUNTER
Insurance will not cover Carafate Suspension change to tablet per Dr Hough Sirbarbara; pt notified of change and also explained to him that he may have to crush the tablets and put them in applesauce. Pt verbalized understanding.     Rx ready to send

## 2022-10-19 ENCOUNTER — OFFICE VISIT (OUTPATIENT)
Dept: FAMILY MEDICINE CLINIC | Facility: CLINIC | Age: 52
End: 2022-10-19
Payer: COMMERCIAL

## 2022-10-19 VITALS
DIASTOLIC BLOOD PRESSURE: 80 MMHG | BODY MASS INDEX: 33.07 KG/M2 | HEART RATE: 80 BPM | SYSTOLIC BLOOD PRESSURE: 130 MMHG | WEIGHT: 231 LBS | OXYGEN SATURATION: 97 % | HEIGHT: 70 IN

## 2022-10-19 DIAGNOSIS — M54.41 ACUTE BILATERAL LOW BACK PAIN WITH RIGHT-SIDED SCIATICA: Primary | ICD-10-CM

## 2022-10-19 PROCEDURE — 99213 OFFICE O/P EST LOW 20 MIN: CPT | Performed by: FAMILY MEDICINE

## 2022-10-19 RX ORDER — CYCLOBENZAPRINE HCL 10 MG
10 TABLET ORAL NIGHTLY PRN
Qty: 7 TABLET | Refills: 0 | Status: SHIPPED | OUTPATIENT
Start: 2022-10-19 | End: 2022-10-26

## 2022-10-19 ASSESSMENT — ENCOUNTER SYMPTOMS
EYE DISCHARGE: 0
DIARRHEA: 0
EYE PAIN: 0
SHORTNESS OF BREATH: 0
SINUS PRESSURE: 0
VOMITING: 0
COLOR CHANGE: 0
CHEST TIGHTNESS: 0
SORE THROAT: 0
FACIAL SWELLING: 0
CONSTIPATION: 0
ABDOMINAL DISTENTION: 0
ABDOMINAL PAIN: 0
COUGH: 0
EYE ITCHING: 0
NAUSEA: 0
STRIDOR: 0
RHINORRHEA: 0
SINUS PAIN: 0
BACK PAIN: 1
EYE REDNESS: 0
BLOOD IN STOOL: 0
WHEEZING: 0

## 2022-10-19 NOTE — ASSESSMENT & PLAN NOTE
Stop methocarbamol. Start flexeril trial. [facility] Will add PT for lumbar onto existing PT for neck and upper back. Pt will call back with PT group for referral purposes.

## 2022-10-19 NOTE — PROGRESS NOTES
[address] [place] [address]  _______________________________________  Rock Agueda MD                 [address] [place] [address] Drive, [address]. [place]                     Tr[place], Chelsey [address]                                                                                    Phone: [phone]                                                                                    Fax: [phone]    Yaima Wilkins is a 47 y.o. male who is seen for evaluation of   Chief Complaint   Patient presents with    Back Pain     Bilateral sciatic flare up ongoing for 8 days. He has missed 4 days of work. Rest usually helps to alleviate his flare up. Knee Pain     Pt also c/o of right knee pain and knee popping for 8 days. HPI:   C/o 8 days ago he  twisted his back and later developed lumbar spasms. He states that pain has persisted and he has been out several days from work. Pain is across b/l lumbar with sciatica down R leg. Rates pain 8/10. Taking muscle relaxers rx by ortho and mobic. Review of Systems:  Review of Systems   Constitutional:  Negative for activity change, appetite change, chills, diaphoresis, fatigue, fever and unexpected weight change. HENT:  Negative for congestion, ear discharge, ear pain, facial swelling, hearing loss, mouth sores, nosebleeds, postnasal drip, rhinorrhea, sinus pressure, sinus pain, sore throat and tinnitus. Eyes:  Negative for pain, discharge, redness, itching and visual disturbance. Respiratory:  Negative for cough, chest tightness, shortness of breath, wheezing and stridor. Cardiovascular:  Negative for chest pain, palpitations and leg swelling. Gastrointestinal:  Negative for abdominal distention, abdominal pain, blood in stool, constipation, diarrhea, nausea and vomiting. Endocrine: Negative for cold intolerance, heat intolerance, polydipsia, polyphagia and polyuria.    Genitourinary:  Negative for decreased urine volume, difficulty urinating, dysuria, flank pain, frequency, hematuria and urgency. Musculoskeletal:  Positive for back pain. Negative for arthralgias, gait problem, joint swelling, myalgias and neck pain. Skin:  Negative for color change, pallor, rash and wound. Neurological:  Negative for dizziness, tremors, seizures, syncope, facial asymmetry, speech difficulty, weakness, light-headedness, numbness and headaches. Psychiatric/Behavioral:  Negative for agitation, behavioral problems, confusion, hallucinations, self-injury, sleep disturbance and suicidal ideas. The patient is not nervous/anxious. History:  Past Medical History:   Diagnosis Date    Arthritis of right acromioclavicular joint 9/29/2022    Borderline high cholesterol     controlled with medication    Chronic pain     Depression, major, in remission ([facility] [address] 8/25/2015    GERD (gastroesophageal reflux disease)     GERD (gastroesophageal reflux disease)     controlled with medication    Hypercholesterolemia     Hypertension     controlled with medication    Morbid obesity ([facility] [address]     BMI- 35.2  (9/20/18)- verbal    Sleep apnea     BMI- 35. Past Surgical History:   Procedure Laterality Date    [place] ABDOMEN SURGERY PROC UNLISTED  2012    umb hernia       Family History   Problem Relation Age of Onset    Delayed Awakening Neg Hx     Post-op Nausea/Vomiting Neg Hx     Hypertension Mother     Pseudochol.  Deficiency Neg Hx     Post-op Cognitive Dysfunction Neg Hx     Hypertension Father     Malig Hypertherm Neg Hx     Emergence Delirium Neg Hx     Hypertension Sister     Other Neg Hx        Social History     Tobacco Use    Smoking status: Never    Smokeless tobacco: Never   Substance Use Topics    Alcohol use: No       No Known Allergies    Current Outpatient Medications   Medication Sig Dispense Refill    cyclobenzaprine (FLEXERIL) 10 MG tablet Take 1 tablet by mouth nightly as needed for Muscle spasms 7 tablet 0    sucralfate (CARAFATE) 1 GM tablet Take 1 tablet by mouth 4 times daily 120 tablet 3    diclofenac sodium (VOLTAREN) 1 % GEL Apply 4 g topically 4 times daily as needed for Pain 150 g 2    amLODIPine-benazepril (LOTREL) 10-20 MG per capsule Take 1 capsule by mouth in the morning. 30 capsule 5    chlorthalidone (HYGROTON) 25 MG tablet Take 1 tablet by mouth in the morning. 30 tablet 5    lansoprazole (PREVACID) 30 MG delayed release capsule Take 1 capsule by mouth in the morning. Takes every morning. Indications: gastroesophageal reflux disease. 30 capsule 5    meloxicam (MOBIC) 15 MG tablet Take 1 tablet by mouth in the morning. 30 tablet 5    pravastatin (PRAVACHOL) 40 MG tablet Take 1 tablet by mouth in the morning. 30 tablet 5     No current facility-administered medications for this visit. Vitals:    /80 (Site: Left Upper Arm, Position: Sitting, Cuff Size: Large Adult)   Pulse 80   Ht 5' 10\" (1.778 m)   Wt 231 lb (104.8 kg)   SpO2 97%   BMI 33.15 kg/m²     Physical Exam:  Physical Exam  Vitals reviewed. Constitutional:       General: He is not in acute distress. Appearance: Normal appearance. He is not ill-appearing, toxic-appearing or diaphoretic. HENT:      Head: Normocephalic and atraumatic. Right Ear: Tympanic membrane, ear canal and external ear normal. There is no impacted cerumen. Left Ear: Tympanic membrane, ear canal and external ear normal. There is no impacted cerumen. Nose: Nose normal. No congestion or rhinorrhea. Mouth/Throat:      Mouth: Mucous membranes are moist.      Pharynx: No oropharyngeal exudate or posterior oropharyngeal erythema. Eyes:      General: No scleral icterus. Right eye: No discharge. Left eye: No discharge. Extraocular Movements: Extraocular movements intact. Conjunctiva/sclera: Conjunctivae normal.      Pupils: Pupils are equal, round, and reactive to light. Cardiovascular:      Rate and Rhythm: Normal rate and regular rhythm. Pulses: Normal pulses. Heart sounds: Normal heart sounds. No murmur heard. No friction rub. No gallop. Pulmonary:      Effort: Pulmonary effort is normal. No respiratory distress. Breath sounds: Normal breath sounds. No stridor. No wheezing, rhonchi or rales. Chest:      Chest wall: No tenderness. Abdominal:      General: Abdomen is flat. Bowel sounds are normal. There is no distension. Palpations: Abdomen is soft. There is no mass. Tenderness: There is no abdominal tenderness. There is no right CVA tenderness, left CVA tenderness, guarding or rebound. Musculoskeletal:         General: No swelling, deformity or signs of injury. Cervical back: Neck supple. No rigidity or tenderness. Lumbar back: Spasms and tenderness present. No bony tenderness. Positive right straight leg raise test. Negative left straight leg raise test.      Right lower leg: No edema. Left lower leg: No edema. Lymphadenopathy:      Cervical: No cervical adenopathy. Skin:     General: Skin is warm and dry. Coloration: Skin is not jaundiced or pale. Findings: No bruising, erythema, lesion or rash. Neurological:      General: No focal deficit present. Mental Status: He is alert. Mental status is at baseline. Motor: No weakness. Coordination: Coordination normal.      Gait: Gait normal.   Psychiatric:         Mood and Affect: Mood normal.         Behavior: Behavior normal.         Thought Content: Thought content normal.         Judgment: Judgment normal.       Assessment/Plan:     ICD-10-CM    1. Acute bilateral low back pain with right-sided sciatica  M54.41 cyclobenzaprine (FLEXERIL) 10 MG tablet     External Referral to Physical Therapy           Problem List          Nervous and Auditory    Acute bilateral low back pain with right-sided sciatica - Primary      Stop methocarbamol. Start flexeril trial. [facility] Will add PT for lumbar onto existing PT for neck and upper back.  Pt will call back with PT group for referral purposes.           Relevant Medications    meloxicam (MOBIC) 15 MG tablet    cyclobenzaprine (FLEXERIL) 10 MG tablet    Other Relevant Orders    External Referral to Physical Therapy        Dorothy Lyn III, MD

## 2022-10-19 NOTE — LETTER
[facility]  Phone: [phone]  Fax: [phone]    Cheryl Jeffers MD        October 19, 2022     Patient: Darrell Eugene   YOB: 1970   Date of Visit: 10/19/2022       To Whom it May Concern:    Blanca Rodriguez was seen in my clinic on 10/19/2022. Please excuse him from work for the following dates: 10/21/2022 - 10/23/2022 for recovery. If you have any questions or concerns, please don't hesitate to call.     Sincerely,         Cheryl Jeffers MD

## 2022-10-20 ENCOUNTER — OFFICE VISIT (OUTPATIENT)
Dept: ORTHOPEDIC SURGERY | Age: 52
End: 2022-10-20
Payer: COMMERCIAL

## 2022-10-20 DIAGNOSIS — M54.2 NECK PAIN: ICD-10-CM

## 2022-10-20 DIAGNOSIS — R20.2 NUMBNESS AND TINGLING OF RIGHT UPPER EXTREMITY: Primary | ICD-10-CM

## 2022-10-20 DIAGNOSIS — R20.0 NUMBNESS AND TINGLING OF RIGHT UPPER EXTREMITY: Primary | ICD-10-CM

## 2022-10-20 DIAGNOSIS — M25.511 RIGHT SHOULDER PAIN, UNSPECIFIED CHRONICITY: ICD-10-CM

## 2022-10-20 PROCEDURE — 99213 OFFICE O/P EST LOW 20 MIN: CPT | Performed by: NURSE PRACTITIONER

## 2022-10-20 NOTE — PROGRESS NOTES
Name: Harlan Barry  YOB: 1970  Gender: male  MRN: [id number]    CC: Follow-up neck pain, right shoulder pain    HPI: This is a 47y.o. year old male who scented to me with onset of right shoulder pain. It was over the acromioclavicular joint. Patient had had a lot of repetition of his right shoulder. He has had previous shoulder surgery with Dr. Lambert Ware. I referred him to sports medicine and he recently underwent a shoulder injection which provided significant relief for a few weeks. He does have known degenerative disc disease from C5-C7. He is in pain management via the [place] for his lower back. He does have some numbness and tingling that can occur and he had borderline neuropathy on nerve conduction studies in 2017. Conservative treatment thus far has included steroid injections in the shoulder, NSAIDs, muscle relaxers, gabapentin, last visit he was given a home exercise program under my care which she has completed since August 31, 2022. He does this 5 times a week and unfortunately has not had any improvement. He still has neck pain his shoulder pain is better and is under the care of sports medicine for this. AMB PAIN ASSESSMENT 8/31/2022   Location Modifiers Right   Severity of Pain 4   Frequency of Pain Intermittent   Limiting Behavior Yes   Result of Injury No   Work-Related Injury No   Are there other pain locations you wish to document?  No        No Known Allergies    Current Outpatient Medications:     cyclobenzaprine (FLEXERIL) 10 MG tablet, Take 1 tablet by mouth nightly as needed for Muscle spasms, Disp: 7 tablet, Rfl: 0    sucralfate (CARAFATE) 1 GM tablet, Take 1 tablet by mouth 4 times daily, Disp: 120 tablet, Rfl: 3    diclofenac sodium (VOLTAREN) 1 % GEL, Apply 4 g topically 4 times daily as needed for Pain, Disp: 150 g, Rfl: 2    amLODIPine-benazepril (LOTREL) 10-20 MG per capsule, Take 1 capsule by mouth in the morning., Disp: 30 capsule, Rfl: 5 chlorthalidone (HYGROTON) 25 MG tablet, Take 1 tablet by mouth in the morning., Disp: 30 tablet, Rfl: 5    lansoprazole (PREVACID) 30 MG delayed release capsule, Take 1 capsule by mouth in the morning. Takes every morning. Indications: gastroesophageal reflux disease., Disp: 30 capsule, Rfl: 5    meloxicam (MOBIC) 15 MG tablet, Take 1 tablet by mouth in the morning., Disp: 30 tablet, Rfl: 5    pravastatin (PRAVACHOL) 40 MG tablet, Take 1 tablet by mouth in the morning., Disp: 30 tablet, Rfl: 5  Past Medical History:   Diagnosis Date    Arthritis of right acromioclavicular joint 9/29/2022    Borderline high cholesterol     controlled with medication    Chronic pain     Depression, major, in remission ([facility] [address] 8/25/2015    GERD (gastroesophageal reflux disease)     GERD (gastroesophageal reflux disease)     controlled with medication    Hypercholesterolemia     Hypertension     controlled with medication    Morbid obesity ([facility] [address]     BMI- 35.2  (9/20/18)- verbal    Sleep apnea     BMI- 35. Tobacco:  reports that he has never smoked. He has never used smokeless tobacco.  Alcohol:   Social History     Substance and Sexual Activity   Alcohol Use No        Radiographic Studies:       MRI Results (most recent):      Assessment/Plan:        ICD-10-CM    1. Numbness and tingling of right upper extremity  R20.0 MRI CERVICAL SPINE WO CONTRAST    R20.2       2. Neck pain  M54.2 MRI CERVICAL SPINE WO CONTRAST      3. Right shoulder pain, unspecified chronicity  M25.511 MRI CERVICAL SPINE WO CONTRAST         Right shoulder pain has improved and most likely associated with acromioclavicular joint arthrosis however he still has some degree of neck pain. This is worse with cervical rotation and extension. Therefore I will go ahead and refer patient for an MRI of the cervical spine. He will follow-up after. -MRI: A cervical MRI was ordered to confirm the diagnosis and assess the severity.        No orders of the defined types were placed in this encounter. Orders Placed This Encounter   Procedures    MRI CERVICAL SPINE WO CONTRAST        3 This is stable chronic illness/condition    Return for MRI results. RACQUEL Kong - CNP  10/20/22      Elements of this note were created using speech recognition software. As such, errors of speech recognition may be present.

## 2022-10-26 ENCOUNTER — OFFICE VISIT (OUTPATIENT)
Dept: FAMILY MEDICINE CLINIC | Facility: CLINIC | Age: 52
End: 2022-10-26
Payer: COMMERCIAL

## 2022-10-26 VITALS — WEIGHT: 240 LBS | HEIGHT: 70 IN | BODY MASS INDEX: 34.36 KG/M2

## 2022-10-26 DIAGNOSIS — I10 HYPERTENSION, UNCONTROLLED: ICD-10-CM

## 2022-10-26 DIAGNOSIS — M25.561 ACUTE PAIN OF RIGHT KNEE: ICD-10-CM

## 2022-10-26 DIAGNOSIS — M54.41 ACUTE BILATERAL LOW BACK PAIN WITH RIGHT-SIDED SCIATICA: Primary | ICD-10-CM

## 2022-10-26 PROCEDURE — 99214 OFFICE O/P EST MOD 30 MIN: CPT | Performed by: FAMILY MEDICINE

## 2022-10-26 ASSESSMENT — PATIENT HEALTH QUESTIONNAIRE - PHQ9
SUM OF ALL RESPONSES TO PHQ QUESTIONS 1-9: 0
2. FEELING DOWN, DEPRESSED OR HOPELESS: 0
SUM OF ALL RESPONSES TO PHQ QUESTIONS 1-9: 0
SUM OF ALL RESPONSES TO PHQ9 QUESTIONS 1 & 2: 0
SUM OF ALL RESPONSES TO PHQ QUESTIONS 1-9: 0
SUM OF ALL RESPONSES TO PHQ QUESTIONS 1-9: 0
1. LITTLE INTEREST OR PLEASURE IN DOING THINGS: 0

## 2022-10-26 ASSESSMENT — ENCOUNTER SYMPTOMS
RESPIRATORY NEGATIVE: 1
EYES NEGATIVE: 1
BACK PAIN: 1

## 2022-10-26 NOTE — PROGRESS NOTES
[address] [place] [address]  _______________________________________  Natalie Oro MD                 [address] [place] [address], [address]. MD [place]                     [facility]                                                                                    Phone: [phone]                                                                                    Fax: [phone]    Amandeep Boswell is a 47 y.o. male who is seen for evaluation of   Chief Complaint   Patient presents with    Back Pain     Recheck        HPI:   Back Pain  This is a chronic problem. Episode frequency: Acute on chronic flare of back pain, lumbar, radiation to the right leg, and physical therapy through the [place], has been out of work for the last 2 weeks but is ready to go back Monday. Knee Pain   Incident location: Right knee pain with no recall of injury specifically. Pain with weakness at times. Hypertension  This is a chronic problem. Progression since onset: Chronic hypertension controlled on medication at present, slightly elevated with pain today, needs refills today and recheck labs on the next visit. Chief Complaint   Patient presents with    Back Pain     Recheck          Review of Systems:    Review of Systems   Constitutional:  Negative for activity change, appetite change, chills and diaphoresis. HENT: Negative. Eyes: Negative. Respiratory: Negative. Endocrine: Negative. Genitourinary: Negative. Musculoskeletal:  Positive for back pain.      History:  Past Medical History:   Diagnosis Date    Arthritis of right acromioclavicular joint 9/29/2022    Borderline high cholesterol     controlled with medication    Chronic pain     Depression, major, in remission ([facility] [address] 8/25/2015    GERD (gastroesophageal reflux disease)     GERD (gastroesophageal reflux disease)     controlled with medication    Hypercholesterolemia     Hypertension     controlled with medication    Morbid obesity (HCC)     BMI- 35.2  (9/20/18)- verbal    Sleep apnea     BMI- 35. Past Surgical History:   Procedure Laterality Date    [place] ABDOMEN SURGERY PROC UNLISTED  2012    umb hernia       Family History   Problem Relation Age of Onset    Delayed Awakening Neg Hx     Post-op Nausea/Vomiting Neg Hx     Hypertension Mother     Pseudochol. Deficiency Neg Hx     Post-op Cognitive Dysfunction Neg Hx     Hypertension Father     Malig Hypertherm Neg Hx     Emergence Delirium Neg Hx     Hypertension Sister     Other Neg Hx        Social History     Tobacco Use    Smoking status: Never    Smokeless tobacco: Never   Substance Use Topics    Alcohol use: No       No Known Allergies    Current Outpatient Medications   Medication Sig Dispense Refill    cyclobenzaprine (FLEXERIL) 10 MG tablet Take 1 tablet by mouth nightly as needed for Muscle spasms 7 tablet 0    sucralfate (CARAFATE) 1 GM tablet Take 1 tablet by mouth 4 times daily 120 tablet 3    diclofenac sodium (VOLTAREN) 1 % GEL Apply 4 g topically 4 times daily as needed for Pain 150 g 2    amLODIPine-benazepril (LOTREL) 10-20 MG per capsule Take 1 capsule by mouth in the morning. 30 capsule 5    chlorthalidone (HYGROTON) 25 MG tablet Take 1 tablet by mouth in the morning. 30 tablet 5    lansoprazole (PREVACID) 30 MG delayed release capsule Take 1 capsule by mouth in the morning. Takes every morning. Indications: gastroesophageal reflux disease. 30 capsule 5    meloxicam (MOBIC) 15 MG tablet Take 1 tablet by mouth in the morning. 30 tablet 5    pravastatin (PRAVACHOL) 40 MG tablet Take 1 tablet by mouth in the morning. 30 tablet 5     No current facility-administered medications for this visit. Vitals:    Ht 5' 10\" (1.778 m)   Wt 240 lb (108.9 kg)   BMI 34.44 kg/m²     Physical Exam:  Physical Exam  Vitals and nursing note reviewed. Constitutional:       Appearance: Normal appearance. He is not ill-appearing or diaphoretic.    HENT: Head: Normocephalic and atraumatic. Nose: Nose normal.   Eyes:      Pupils: Pupils are equal, round, and reactive to light. Cardiovascular:      Rate and Rhythm: Normal rate and regular rhythm. Pulses: Normal pulses. Heart sounds: Normal heart sounds. Pulmonary:      Effort: Pulmonary effort is normal.      Breath sounds: Normal breath sounds. Musculoskeletal:         General: Tenderness present. No swelling. Cervical back: Normal range of motion and neck supple. Comments:   Patient with mild swelling right knee, pain on range of motion with extremes of flexion and extension. Slightly reduced range of motion of the lumbar spine for rotation due to pain as well     Skin:     General: Skin is warm and dry. Findings: No erythema or rash. Neurological:      General: No focal deficit present. Mental Status: He is alert and oriented to person, place, and time. Mental status is at baseline. Psychiatric:         Mood and Affect: Mood normal.     Assessment/Plan:     ICD-10-CM    1. Acute bilateral low back pain with right-sided sciatica  M54.41       2. Hypertension, uncontrolled  I10       3. Acute pain of right knee  M25.561         Bilateral lower back pain with right-sided sciatica. Patient slowly improving over time with rest, he needs work forms completed for return to full duty next Monday.     Right knee pain with weakness, encouraged patient to use ice rest a brace and continue to pursue physical therapy  Hypertension controlled on medication needs refills today, recheck numbers look pretty good, continue improving diet and exercise and lifestyle measures at home  Kirk Pool MD

## 2022-11-11 ENCOUNTER — OFFICE VISIT (OUTPATIENT)
Dept: ORTHOPEDIC SURGERY | Age: 52
End: 2022-11-11
Payer: COMMERCIAL

## 2022-11-11 DIAGNOSIS — M48.02 CERVICAL STENOSIS OF SPINAL CANAL: ICD-10-CM

## 2022-11-11 DIAGNOSIS — M25.561 ACUTE PAIN OF RIGHT KNEE: ICD-10-CM

## 2022-11-11 DIAGNOSIS — M47.812 ARTHROPATHY OF CERVICAL FACET JOINT: Primary | ICD-10-CM

## 2022-11-11 PROCEDURE — 99213 OFFICE O/P EST LOW 20 MIN: CPT | Performed by: NURSE PRACTITIONER

## 2022-11-11 NOTE — PROGRESS NOTES
Name: Matty Rodriguez  YOB: 1970  Gender: male  MRN: [id number]    CC: Follow-up neck pain, increasing right knee pain    HPI: This is a 47y.o. year old male who has been under the care of Dr. Alonso Fermin for right shoulder issues. He has had improvement with this after shoulder injection but has chronic neck pain. He has known degenerative disc disease from C5-C7. He also can have some numbness and tingling occasionally but was diagnosed with borderline neuropathy as well. He completed a home exercise program under my care. He still had neck pain but his shoulder pain was better. Therefore I referred him for an MRI of the cervical spine. Patient also now is complaining of knee pain. He states he injured his knee. He is got pain in the joint lines and in the posterior aspect of the right knee. AMB PAIN ASSESSMENT 8/31/2022   Location Modifiers Right   Severity of Pain 4   Frequency of Pain Intermittent   Limiting Behavior Yes   Result of Injury No   Work-Related Injury No   Are there other pain locations you wish to document? No        No Known Allergies    Current Outpatient Medications:     sucralfate (CARAFATE) 1 GM tablet, Take 1 tablet by mouth 4 times daily, Disp: 120 tablet, Rfl: 3    diclofenac sodium (VOLTAREN) 1 % GEL, Apply 4 g topically 4 times daily as needed for Pain, Disp: 150 g, Rfl: 2    amLODIPine-benazepril (LOTREL) 10-20 MG per capsule, Take 1 capsule by mouth in the morning., Disp: 30 capsule, Rfl: 5    chlorthalidone (HYGROTON) 25 MG tablet, Take 1 tablet by mouth in the morning., Disp: 30 tablet, Rfl: 5    lansoprazole (PREVACID) 30 MG delayed release capsule, Take 1 capsule by mouth in the morning. Takes every morning.  Indications: gastroesophageal reflux disease., Disp: 30 capsule, Rfl: 5    meloxicam (MOBIC) 15 MG tablet, Take 1 tablet by mouth in the morning., Disp: 30 tablet, Rfl: 5    pravastatin (PRAVACHOL) 40 MG tablet, Take 1 tablet by mouth in the morning., Disp: 30 tablet, Rfl: 5  Past Medical History:   Diagnosis Date    Arthritis of right acromioclavicular joint 9/29/2022    Borderline high cholesterol     controlled with medication    Chronic pain     Depression, major, in remission ([facility] [address] 8/25/2015    GERD (gastroesophageal reflux disease)     GERD (gastroesophageal reflux disease)     controlled with medication    Hypercholesterolemia     Hypertension     controlled with medication    Morbid obesity ([facility] [address]     BMI- 35.2  (9/20/18)- verbal    Sleep apnea     BMI- 35. Tobacco:  reports that he has never smoked. He has never used smokeless tobacco.  Alcohol:   Social History     Substance and Sexual Activity   Alcohol Use No        Radiographic Studies:       MRI Results (most recent): MRI Result (most recent):  MRI CERVICAL SPINE WO CONTRAST 11/02/2022    Narrative  Exam: MRI Cervical spine without contrast.  Indication: Neck pain, numbness and tingling of the right upper extremity, right  shoulder pain  Comparison: Cervical spine radiograph, 8/31/2022  Technique: Multiplanar multisequence imaging of the cervical spine without  contrast.    FINDINGS:  Straightening of the normal lordosis. No spondylolisthesis. Vertebral body  heights are preserved. No fracture or aggressive marrow signal abnormality. The  spinal cord is normal in morphology and signal intensity. Perivertebral soft  tissues are unremarkable. Multilevel disc desiccation with varying degrees of  disc space narrowing, most advanced at C5-C6 and C6-C7. C2-C3: No spinal canal or neuroforaminal stenosis. C3-C4: Diffuse disc osteophyte complex with bilateral uncovertebral hypertrophy. No spinal canal stenosis. Mild bilateral neuroforaminal stenoses. C4-C5: Diffuse disc osteophyte complex with bilateral uncovertebral hypertrophy  and facet arthropathy. No spinal canal stenosis mild right and moderate left  neuroforaminal stenoses.     C5-C6: Diffuse disc osteophyte complex with bilateral uncovertebral hypertrophy  and facet arthropathy. No spinal canal stenosis. Moderate bilateral  neuroforaminal stenoses. C6-C7: Diffuse disc osteophyte complex eccentric to the left with left greater  than right uncovertebral hypertrophy. Bilateral facet arthropathy. No spinal  canal stenosis. Mild right and severe left neuroforaminal stenoses. C7-T1: Shallow disc osteophyte complex eccentric to the left with left facet  arthropathy and uncovertebral hypertrophy. No spinal canal or right  neuroforaminal stenosis. Mild left neuroforaminal stenosis. Impression  1. Multilevel degenerative disc disease and facet arthropathy, most advanced at  C5-C6 and C6-C7 with neuroforaminal stenoses most advanced and severe on the  left at C6-C7. No spinal canal stenosis. 2. Straightening of the normal lordosis. Assessment/Plan:        ICD-10-CM    1. Arthropathy of cervical facet joint  M47.812       2. Cervical stenosis of spinal canal  M48.02       3. Acute pain of right knee  M25.561 [facility] (Spine Surgery)         Reviewed patient's MRI findings. He has some degree of spinal stenosis at C5-6 and there is some foraminal narrowing. The most prominent is on the left at C6-7. However he is really not having any radiculopathy on the left. He has multilevel facet arthropathy. I discussed with him that I do not see anything at this point that I would warrant surgery for. I would recommend interventional management or continuing conservative care. We discussed a pain management referral for his neck for injections but right now he is more concerned about his right knee pain. Therefore we will refer him to sports medicine for further evaluation of this.      -Observation only:  The patient will be treated observantly with self directed symptomatic care. Instructions were given to follow up if there is any neurologic or functional decline.       No orders of the defined types were placed in this encounter. Orders Placed This Encounter   Procedures    [address] (Spine Surgery)        3 This is stable chronic illness/condition    Return for refer to Sports med for knee pain . Will call if he would like a pain management referral for cervical injections. RACQUEL Werner CNP  11/11/22      Elements of this note were created using speech recognition software. As such, errors of speech recognition may be present.

## 2022-11-17 ENCOUNTER — OFFICE VISIT (OUTPATIENT)
Dept: FAMILY MEDICINE CLINIC | Facility: CLINIC | Age: 52
End: 2022-11-17
Payer: COMMERCIAL

## 2022-11-17 VITALS
DIASTOLIC BLOOD PRESSURE: 80 MMHG | SYSTOLIC BLOOD PRESSURE: 110 MMHG | WEIGHT: 240 LBS | BODY MASS INDEX: 34.36 KG/M2 | HEIGHT: 70 IN

## 2022-11-17 DIAGNOSIS — I10 ESSENTIAL (PRIMARY) HYPERTENSION: ICD-10-CM

## 2022-11-17 DIAGNOSIS — R73.9 HIGH BLOOD SUGAR: ICD-10-CM

## 2022-11-17 DIAGNOSIS — E83.42 HYPOMAGNESEMIA: ICD-10-CM

## 2022-11-17 DIAGNOSIS — M25.561 ACUTE PAIN OF RIGHT KNEE: ICD-10-CM

## 2022-11-17 DIAGNOSIS — R10.84 GENERALIZED ABDOMINAL PAIN: ICD-10-CM

## 2022-11-17 DIAGNOSIS — K29.01 ACUTE SUPERFICIAL GASTRITIS WITH HEMORRHAGE: ICD-10-CM

## 2022-11-17 DIAGNOSIS — R53.83 FATIGUE, UNSPECIFIED TYPE: ICD-10-CM

## 2022-11-17 DIAGNOSIS — E78.2 HYPERLIPEMIA, MIXED: ICD-10-CM

## 2022-11-17 DIAGNOSIS — N52.9 ERECTILE DYSFUNCTION, UNSPECIFIED ERECTILE DYSFUNCTION TYPE: ICD-10-CM

## 2022-11-17 DIAGNOSIS — M54.41 ACUTE BILATERAL LOW BACK PAIN WITH RIGHT-SIDED SCIATICA: Primary | ICD-10-CM

## 2022-11-17 DIAGNOSIS — F32.5 DEPRESSION, MAJOR, IN REMISSION (HCC): ICD-10-CM

## 2022-11-17 DIAGNOSIS — I10 HYPERTENSION, UNCONTROLLED: ICD-10-CM

## 2022-11-17 LAB
ALBUMIN SERPL-MCNC: 3.5 G/DL (ref 3.5–5)
ALBUMIN/GLOB SERPL: 1.1 {RATIO} (ref 0.4–1.6)
ALP SERPL-CCNC: 80 U/L (ref 50–136)
ALT SERPL-CCNC: 40 U/L (ref 12–65)
ANION GAP SERPL CALC-SCNC: 5 MMOL/L (ref 2–11)
AST SERPL-CCNC: 25 U/L (ref 15–37)
BASOPHILS # BLD: 0 K/UL (ref 0–0.2)
BASOPHILS NFR BLD: 0 % (ref 0–2)
BILIRUB SERPL-MCNC: 0.5 MG/DL (ref 0.2–1.1)
BUN SERPL-MCNC: 14 MG/DL (ref 6–23)
CALCIUM SERPL-MCNC: 10 MG/DL (ref 8.3–10.4)
CHLORIDE SERPL-SCNC: 107 MMOL/L (ref 101–110)
CHOLEST SERPL-MCNC: 193 MG/DL
CO2 SERPL-SCNC: 30 MMOL/L (ref 21–32)
CREAT SERPL-MCNC: 0.9 MG/DL (ref 0.8–1.5)
DIFFERENTIAL METHOD BLD: NORMAL
EOSINOPHIL # BLD: 0.1 K/UL (ref 0–0.8)
EOSINOPHIL NFR BLD: 1 % (ref 0.5–7.8)
ERYTHROCYTE [DISTWIDTH] IN BLOOD BY AUTOMATED COUNT: 14.1 % (ref 11.9–14.6)
GLOBULIN SER CALC-MCNC: 3.3 G/DL (ref 2.8–4.5)
GLUCOSE SERPL-MCNC: 100 MG/DL (ref 65–100)
HCT VFR BLD AUTO: 43.8 % (ref 41.1–50.3)
HDLC SERPL-MCNC: 57 MG/DL (ref 40–60)
HDLC SERPL: 3.4 {RATIO}
HGB BLD-MCNC: 14.7 G/DL (ref 13.6–17.2)
IMM GRANULOCYTES # BLD AUTO: 0 K/UL (ref 0–0.5)
IMM GRANULOCYTES NFR BLD AUTO: 0 % (ref 0–5)
LDLC SERPL CALC-MCNC: 118.4 MG/DL
LYMPHOCYTES # BLD: 1.9 K/UL (ref 0.5–4.6)
LYMPHOCYTES NFR BLD: 23 % (ref 13–44)
MAGNESIUM SERPL-MCNC: 1.9 MG/DL (ref 1.8–2.4)
MCH RBC QN AUTO: 29.2 PG (ref 26.1–32.9)
MCHC RBC AUTO-ENTMCNC: 33.6 G/DL (ref 31.4–35)
MCV RBC AUTO: 87.1 FL (ref 82–102)
MONOCYTES # BLD: 0.6 K/UL (ref 0.1–1.3)
MONOCYTES NFR BLD: 8 % (ref 4–12)
NEUTS SEG # BLD: 5.7 K/UL (ref 1.7–8.2)
NEUTS SEG NFR BLD: 68 % (ref 43–78)
NRBC # BLD: 0 K/UL (ref 0–0.2)
PLATELET # BLD AUTO: 309 K/UL (ref 150–450)
PMV BLD AUTO: 9.4 FL (ref 9.4–12.3)
POTASSIUM SERPL-SCNC: 3.6 MMOL/L (ref 3.5–5.1)
PROT SERPL-MCNC: 6.8 G/DL (ref 6.3–8.2)
RBC # BLD AUTO: 5.03 M/UL (ref 4.23–5.6)
SODIUM SERPL-SCNC: 142 MMOL/L (ref 133–143)
TRIGL SERPL-MCNC: 88 MG/DL (ref 35–150)
VLDLC SERPL CALC-MCNC: 17.6 MG/DL (ref 6–23)
WBC # BLD AUTO: 8.3 K/UL (ref 4.3–11.1)

## 2022-11-17 PROCEDURE — 3078F DIAST BP <80 MM HG: CPT | Performed by: FAMILY MEDICINE

## 2022-11-17 PROCEDURE — 99214 OFFICE O/P EST MOD 30 MIN: CPT | Performed by: FAMILY MEDICINE

## 2022-11-17 PROCEDURE — 3074F SYST BP LT 130 MM HG: CPT | Performed by: FAMILY MEDICINE

## 2022-11-17 RX ORDER — LANSOPRAZOLE 30 MG/1
30 CAPSULE, DELAYED RELEASE ORAL DAILY
Qty: 90 CAPSULE | Refills: 1 | Status: SHIPPED | OUTPATIENT
Start: 2022-11-17

## 2022-11-17 RX ORDER — AMLODIPINE BESYLATE AND BENAZEPRIL HYDROCHLORIDE 10; 20 MG/1; MG/1
1 CAPSULE ORAL DAILY
Qty: 90 CAPSULE | Refills: 1 | Status: SHIPPED | OUTPATIENT
Start: 2022-11-17

## 2022-11-17 RX ORDER — CHLORTHALIDONE 25 MG/1
25 TABLET ORAL DAILY
Qty: 90 TABLET | Refills: 1 | Status: SHIPPED | OUTPATIENT
Start: 2022-11-17

## 2022-11-17 RX ORDER — PRAVASTATIN SODIUM 40 MG
40 TABLET ORAL DAILY
Qty: 90 TABLET | Refills: 1 | Status: SHIPPED | OUTPATIENT
Start: 2022-11-17

## 2022-11-17 RX ORDER — SUCRALFATE 1 G/1
1 TABLET ORAL 4 TIMES DAILY
Qty: 120 TABLET | Refills: 5 | Status: SHIPPED | OUTPATIENT
Start: 2022-11-17

## 2022-11-17 RX ORDER — MELOXICAM 15 MG/1
15 TABLET ORAL DAILY
Qty: 90 TABLET | Refills: 1 | Status: SHIPPED | OUTPATIENT
Start: 2022-11-17

## 2022-11-17 ASSESSMENT — ENCOUNTER SYMPTOMS
RESPIRATORY NEGATIVE: 1
APNEA: 0
EYE PAIN: 0
EYE DISCHARGE: 0
BACK PAIN: 1
BLURRED VISION: 0
EYE ITCHING: 0
ORTHOPNEA: 0
SHORTNESS OF BREATH: 0

## 2022-11-17 ASSESSMENT — PATIENT HEALTH QUESTIONNAIRE - PHQ9
SUM OF ALL RESPONSES TO PHQ QUESTIONS 1-9: 0
2. FEELING DOWN, DEPRESSED OR HOPELESS: 0
SUM OF ALL RESPONSES TO PHQ QUESTIONS 1-9: 0
1. LITTLE INTEREST OR PLEASURE IN DOING THINGS: 0
SUM OF ALL RESPONSES TO PHQ9 QUESTIONS 1 & 2: 0
SUM OF ALL RESPONSES TO PHQ QUESTIONS 1-9: 0
SUM OF ALL RESPONSES TO PHQ QUESTIONS 1-9: 0

## 2022-11-17 NOTE — PROGRESS NOTES
[address] [place] [address]  _______________________________________  Katherine Castorena MD                 [address] [place] [address],  [address]. [place], [address] [place] [address]                     [address]                                                                                    Phone: [phone]                                                                                    Fax: [phone]    Neeta Gandara is a 47 y.o. male who is seen for evaluation of   Chief Complaint   Patient presents with    Lower Back Pain    Hip Pain    Knee Pain     R knee       HPI:   Hip Pain   Incident location: hip pain on right side, pain in knee as well. twisted it falling at home a few weeks ago. Pain and can't bear weight. Knee Pain   Incident location: right side pain and sore and hurt it a few weeks ago, twisted at home, pain with weight bearing and stepping, out of work a week or so, needs paperwork filled out,   Hypertension  This is a chronic problem. Progression since onset: on meds, need refills and labs, no side effect noted. Pertinent negatives include no anxiety, blurred vision, chest pain, headaches, malaise/fatigue, neck pain, orthopnea, peripheral edema, PND or shortness of breath. Hyperlipidemia  This is a chronic problem. Condition status: on meds, need refills and labs, no side effect noted. Pertinent negatives include no chest pain or shortness of breath. Back Pain  Episode frequency: chronic pain in back, reduced ROM, ongoing issue, needs refills mobic. Pertinent negatives include no chest pain or headaches. Chief Complaint   Patient presents with    Lower Back Pain    Hip Pain    Knee Pain     R knee         Review of Systems:    Review of Systems   Constitutional:  Negative for activity change, chills, fatigue and malaise/fatigue. HENT: Negative. Eyes:  Negative for blurred vision, pain, discharge and itching. Respiratory: Negative.   Negative for apnea and shortness of breath. Cardiovascular: Negative. Negative for chest pain, orthopnea and PND. Endocrine: Negative. Genitourinary: Negative. Musculoskeletal:  Positive for back pain. Negative for neck pain. Neurological:  Negative for headaches. History:  Past Medical History:   Diagnosis Date    Arthritis of right acromioclavicular joint 9/29/2022    Borderline high cholesterol     controlled with medication    Chronic pain     [facility], [facility]    GERD (gastroesophageal reflux disease)     GERD (gastroesophageal reflux disease)     controlled with medication    Hypercholesterolemia     Hypertension     controlled with medication    Morbid obesity ([facility] [address]     BMI- 35.2  (9/20/18)- verbal    Sleep apnea     BMI- 35. Past Surgical History:   Procedure Laterality Date    [place] ABDOMEN SURG[place] PROC UNLISTED  2012    umb hernia       Family History   Problem Relation Age of Onset    Delayed Awakening Neg Hx     Post-op Nausea/Vomiting Neg Hx     Hypertension Mother     Pseudochol. Deficiency Neg Hx     Post-op Cognitive Dysfunction Neg Hx     Hypertension Father     Malig Hypertherm Neg Hx     Emergence Delirium Neg Hx     Hypertension Sister     Other Neg Hx        Social History     Tobacco Use    Smoking status: Never    Smokeless tobacco: Never   Substance Use Topics    Alcohol use: No       No Known Allergies    Current Outpatient Medications   Medication Sig Dispense Refill    amLODIPine-benazepril (LOTREL) 10-20 MG per capsule Take 1 capsule by mouth daily 90 capsule 1    chlorthalidone (HYGROTON) 25 MG tablet Take 1 tablet by mouth daily 90 tablet 1    lansoprazole (PREVACID) 30 MG delayed release capsule Take 1 capsule by mouth daily Takes every morning.  Indications: gastroesophageal reflux disease 90 capsule 1    meloxicam (MOBIC) 15 MG tablet Take 1 tablet by mouth daily 90 tablet 1    pravastatin (PRAVACHOL) 40 MG tablet Take 1 tablet by mouth daily 90 tablet 1    diclofenac sodium (VOLTAREN) 1 % GEL Apply 4 g topically 4 times daily as needed for Pain 150 g 5    sucralfate (CARAFATE) 1 GM tablet Take 1 tablet by mouth 4 times daily 120 tablet 5     No current facility-administered medications for this visit. Vitals:    /80 (Site: Left Upper Arm, Position: Sitting)   Ht 5' 10\" (1.778 m)   Wt 240 lb (108.9 kg)   BMI 34.44 kg/m²     Physical Exam:  Physical Exam  Vitals and nursing note reviewed. Constitutional:       Appearance: Normal appearance. He is obese. He is not ill-appearing or diaphoretic. HENT:      Head: Normocephalic and atraumatic. Nose: Nose normal.   Eyes:      Pupils: Pupils are equal, round, and reactive to light. Cardiovascular:      Rate and Rhythm: Normal rate and regular rhythm. Pulses: Normal pulses. Heart sounds: Normal heart sounds. Pulmonary:      Effort: Pulmonary effort is normal.      Breath sounds: Normal breath sounds. Musculoskeletal:         General: Tenderness present. No swelling. Cervical back: Normal range of motion and neck supple. Comments: Pain in lower back and pain in right knee, sore to touch   Skin:     General: Skin is warm and dry. Findings: No erythema or rash. Neurological:      General: No focal deficit present. Mental Status: He is alert and oriented to person, place, and time. Mental status is at baseline. Psychiatric:         Mood and Affect: Mood normal.     Assessment/Plan:     ICD-10-CM    1. Acute bilateral low back pain with right-sided sciatica  M54.41 meloxicam (MOBIC) 15 MG tablet     diclofenac sodium (VOLTAREN) 1 % GEL      2. Hypertension, uncontrolled  I10 amLODIPine-benazepril (LOTREL) 10-20 MG per capsule     chlorthalidone (HYGROTON) 25 MG tablet     CBC with Auto Differential     Comprehensive Metabolic Panel     Comprehensive Metabolic Panel     CBC with Auto Differential      3. Generalized abdominal pain  R10.84       4. Acute superficial gastritis with hemorrhage  K29.01 lansoprazole (PREVACID) 30 MG delayed release capsule     sucralfate (CARAFATE) 1 GM tablet      5. Hyperlipemia, mixed  E78.2 pravastatin (PRAVACHOL) 40 MG tablet     Lipid Panel     Lipid Panel      6. Depression, major, in remission ([facility] [address]  F32.5       7. Essential (primary) hypertension  I10       8. Erectile dysfunction, unspecified erectile dysfunction type  N52.9       9. Acute pain of right knee  M25.561 XR KNEE RIGHT (3 VIEWS)     Ambulatory referral to Orthopedic Surgery      10. High blood sugar  R73.9 Hemoglobin A1C     Hemoglobin A1C      11. Hypomagnesemia  E83.42 Magnesium     Magnesium      12. Fatigue, unspecified type  R53.83         Labs and medicines sent and pending as appropriate  Encourage low salt diet with exercise as tolerated  Encourage weight control efforts to reduce overall health risks in future  Encourage monitor BP at home   Recheck in 4 months or as needed for other problems.      Stormy Giordano MD

## 2022-11-18 LAB
EST. AVERAGE GLUCOSE BLD GHB EST-MCNC: 128 MG/DL
HBA1C MFR BLD: 6.1 % (ref 4.8–5.6)

## 2022-11-22 ENCOUNTER — OFFICE VISIT (OUTPATIENT)
Dept: ORTHOPEDIC SURGERY | Age: 52
End: 2022-11-22
Payer: COMMERCIAL

## 2022-11-22 DIAGNOSIS — M25.561 RIGHT KNEE PAIN, UNSPECIFIED CHRONICITY: ICD-10-CM

## 2022-11-22 DIAGNOSIS — E66.01 SEVERE OBESITY (BMI 35.0-39.9) WITH COMORBIDITY (HCC): ICD-10-CM

## 2022-11-22 DIAGNOSIS — M19.011 ARTHRITIS OF RIGHT ACROMIOCLAVICULAR JOINT: Primary | ICD-10-CM

## 2022-11-22 DIAGNOSIS — M25.511 ACUTE PAIN OF RIGHT SHOULDER: ICD-10-CM

## 2022-11-22 PROCEDURE — 20610 DRAIN/INJ JOINT/BURSA W/O US: CPT | Performed by: SPECIALIST/TECHNOLOGIST

## 2022-11-22 PROCEDURE — 99214 OFFICE O/P EST MOD 30 MIN: CPT | Performed by: SPECIALIST/TECHNOLOGIST

## 2022-11-22 RX ORDER — TRIAMCINOLONE ACETONIDE 40 MG/ML
40 INJECTION, SUSPENSION INTRA-ARTICULAR; INTRAMUSCULAR ONCE
Status: COMPLETED | OUTPATIENT
Start: 2022-11-22 | End: 2022-11-22

## 2022-11-22 RX ADMIN — TRIAMCINOLONE ACETONIDE 40 MG: 40 INJECTION, SUSPENSION INTRA-ARTICULAR; INTRAMUSCULAR at 10:40

## 2022-11-22 NOTE — PROGRESS NOTES
Name: Amandeep Boswell  YOB: 1970  Gender: male  MRN: [id number]      CC: Follow-up (R shoulder recheck)       HPI: Amandeep Boswell is a 47 y.o. male who returns for follow up on right shoulder pain. He received an [facility] joint injection last visit, which he notes provided only 2 weeks worth of relief. He reports continued right shoulder pain while reaching across his chest.  His primary complaint today is also right knee pain which has started at the end of September with no mechanism of injury. He reports that he has diffuse knee pain which is constant and has noticed increase in swelling. He reports that he has pain with prolonged standing and walking. Physical Examination:  General: no acute distress  Lungs: breathing easily  CV: regular rhythm by pulse  Right Shoulder: No obvious deformity of the biceps. Tenderness to palpation over the [facility] joint. Active forward flexion to 170 degrees with mild pain in the extreme. External rotation with elbows at side equal bilaterally. External rotation with elbows at side resisted range of motion 5/5 strength. Pain with empty can testing with 4+/5 strength in the empty can position. Positive Violetta Clos, negative Neer's. Pain with Teller's and speeds. Right knee: Minimal effusion present. Tenderness to palpation in both the medial and lateral joint lines. Active extension limited to 2 degrees shy of full extension with flexion limited to 90 degrees. Negative ligamentous exam x4. Mild pain with Jose's. Positive bounce home test.    Imaging:  Knee XR: 4 views     Clinical Indication  1. Arthritis of right acromioclavicular joint    2. Acute pain of right shoulder    3. Severe obesity (BMI 35.0-39. 9) with comorbidity ([facility] [address]    4.  Right knee pain, unspecified chronicity           Report: AP, lateral, PA flexion, sunrise views of the Right knee demonstrates no acute fracture dislocation mild degenerative changes localized to the medial compartment    Impression: Mild DJD as above               Assessment:     ICD-10-CM    1. Arthritis of right acromioclavicular joint  M19.011       2. Acute pain of right shoulder  M25.511       3. Severe obesity (BMI 35.0-39. 9) with comorbidity ([address]  E66.01       4. Right knee pain, unspecified chronicity  M25.561 XR KNEE RIGHT (MIN 4 VIEWS)          Plan: With regards to his right shoulder I think majority of his symptoms today are actually from subacromial impingement, he did get 2 weeks of relief from the [facility] [place], operated by [facility] joint injection. I think that he would benefit from a subacromial injection today and continued his home exercise program.  With regards to his right knee I think it is possible he could have a meniscal pathology and I discussed conservative treatment options to include corticosteroid injection versus advanced imaging. Think is reasonable based on clinical exam to evaluate this further with an MRI. The patient elected to proceed with a subacromial injection today. After verbal informed consent was obtained after sterile prep the right shoulder subacromial space was injected from a posterior lateral approach with 6 cc of 0.25% Marcaine and 1 cc of 40 mg Kenalog. The patient tolerated the procedure well was given postinjection flare precautions.               Kate Martínez, SOM  Orthopaedics and Sports Medicine

## 2022-11-28 ENCOUNTER — OFFICE VISIT (OUTPATIENT)
Dept: ORTHOPEDIC SURGERY | Age: 52
End: 2022-11-28

## 2022-11-28 DIAGNOSIS — M25.561 RIGHT KNEE PAIN, UNSPECIFIED CHRONICITY: Primary | ICD-10-CM

## 2022-11-28 NOTE — PROGRESS NOTES
Not seen today. Patient under the treatment of Dr. Teresa Gauthier and Christiano Franklin. Patient brought up his knee pain as his last visit with Christiano Franklin and has MRI schedules.

## 2022-12-06 ENCOUNTER — OFFICE VISIT (OUTPATIENT)
Dept: ORTHOPEDIC SURGERY | Age: 52
End: 2022-12-06

## 2022-12-06 VITALS — WEIGHT: 240 LBS | BODY MASS INDEX: 34.36 KG/M2 | HEIGHT: 70 IN

## 2022-12-06 DIAGNOSIS — M25.561 RIGHT KNEE PAIN, UNSPECIFIED CHRONICITY: ICD-10-CM

## 2022-12-06 DIAGNOSIS — M23.203 DEGENERATIVE TEAR OF MEDIAL MENISCUS, RIGHT: ICD-10-CM

## 2022-12-06 DIAGNOSIS — M84.453A: Primary | ICD-10-CM

## 2022-12-06 RX ORDER — ERGOCALCIFEROL 1.25 MG/1
1 CAPSULE ORAL
Qty: 16 CAPSULE | Refills: 0 | Status: SHIPPED | OUTPATIENT
Start: 2022-12-08 | End: 2023-02-02

## 2022-12-06 RX ORDER — CALCIUM CARBONATE 500(1250)
500 TABLET ORAL DAILY
Qty: 30 TABLET | Refills: 1 | Status: SHIPPED | OUTPATIENT
Start: 2022-12-06 | End: 2023-01-31

## 2022-12-06 RX ORDER — TRIAMCINOLONE ACETONIDE 40 MG/ML
40 INJECTION, SUSPENSION INTRA-ARTICULAR; INTRAMUSCULAR ONCE
Status: COMPLETED | OUTPATIENT
Start: 2022-12-06 | End: 2022-12-06

## 2022-12-06 RX ADMIN — TRIAMCINOLONE ACETONIDE 40 MG: 40 INJECTION, SUSPENSION INTRA-ARTICULAR; INTRAMUSCULAR at 09:50

## 2022-12-06 NOTE — PROGRESS NOTES
Patient was fitted and instruted on the use of a crutches. The crutches  was adjusted to the patient's height and arm length. Patient walked around with the crutches  to insure it was set at a comfortable height. I explained that patient needs tennis shoes on when using the crutches to insure no injury's . Patient read and signed documenting they understand and agree to [facility]'s current DME return policy.

## 2022-12-06 NOTE — LETTER
DME Patient Authorization Form    Name: Shweta Gage  : 1970  MRN: [id number]   Age: 47 y.o. Gender: male  Delivery Address: [facility]     Diagnosis:     ICD-10-CM    1. Right knee pain, unspecified chronicity  M25.561            Requested DME:  [id number] ($71.00) X 1 - right        Clinical Notes:     **Indicates non-covered items by insurance. Payment expected on date of service. Electronically signed by  Provider: Yoana Canseco MD__Date: 2022                            [facility] Tax ID # [id number]        Durable Medical Equipment and/or Orthotics Patient Consent     I understand that my physician has prescribed this medical supply as part of my treatment plan as a matter of Medical Necessity.  I understand that I have a choice in where I receive my prescribed orthopedic supplies and/or services.  I authorize [facility] to furnish this service/product and to provide my insurance carrier with any information requested in order to process for payment.  I instruct my insurance carrier to pay [facility] directly for these services/products.  I understand that my insurance carrier may deny payment for this supply because it is a non-covered item, deemed not medically necessary or considered experimental.   I understand that any cost not covered by my insurance carrier will be solely my financial responsibility.  I have received the Supplier Standards and have reviewed them.  I have received the prescribed item and have been fully instructed on the proper use of the above services/products.    ______ (Patient Initials) I understand that all DME items are non-returnable after being dispensed. Items still in sealed packaging may be returned up to 14 days after purchasing.  [address] will replace items that are defective.    ______ (Patient Initials) I understand that Sweta Salazar will not file a claim with my insurance carrier for this service/product and I am waiving my right to file a claim on my own for this service/product with my insurance company as this item is NON-COVERED (Denoted by the **) by my Insurance company/policy. ______ (Patient Initials) I understand that I am responsible to bring my equipment to the hospital for any surgery. ______________________________________________  ________________________  Patient / Inna Tidwell            Thank you for considering [facility]. Your physician has prescribed specific medical equipment or devices for your home use. The following describes your rights and responsibilities as our customer. Right to Choose Providers: You have a choice regarding which company supplies your home medical equipment and devices, and to consult your physician in this decision. You may choose a medical supply store, a home medical equipment provider, or a specialist such as [facility]. [facility] will coordinate with your physician to provide the medical equipment or devices prescribed for your home use. Right to Service:  You have the right to considerate, respectful and nondiscriminatory care. You have the right to receive accurate and easily understood information about your health care. If you speak a foreign language, or don't understand the discussions, assistance will be provided to allow you to make informed health care decisions. You have the right to know your treatment options and to participate in decisions about your care, including the right to accept or refuse treatment. You have the right to expect a reasonable response to your requests for treatment or service.   You have the right to talk in confidence with health care providers and to have information on the DMEPOS supplier application. Any changes to this information must be reported to the [facility] within 30 days. An authorized individual (one whose signature is binding) must sign the application for billing privileges. A supplier must fill orders from its own inventory, or must contract with other companies for the purchase of items necessary to fill the order. A supplier may not contract with any entity that is currently excluded from the Medicare program, any [facility] program, or from any other Federal procurement or Nonprocurement programs. A supplier must advise beneficiaries that they may rent or purchase inexpensive or routinely purchased durable medical equipment, and of the purchase option for capped rental equipment. A supplier must notify beneficiaries of warranty coverage and honor all warranties under applicable State Law, and repair or replace free of charge Medicare covered items that are under warranty. A supplier must maintain a physical facility on an appropriate site. A supplier must permit CMS, or its agents to conduct on-site inspections to ascertain the supplier's compliance with these standards. The supplier location must be accessible to beneficiaries during reasonable business hours, and must maintain a visible sign and posted hours of operation. A supplier must maintain a primary business telephone listed under the name of the business in a [employer] or a toll free number available through directory assistance. The exclusive use of a beeper, answering machine or cell phone is prohibited. A supplier must have comprehensive liability insurance in the amount of at least $300,000 that covers both the supplier's place of business and all customers and employees of the supplier. If the supplier manufactures its own items, this insurance must also cover product liability and completed operations.   A supplier must agree not to initiate telephone contact with beneficiaries, with a few exceptions allowed. This standard prohibits suppliers from calling beneficiaries in order to solicit new business. A supplier is responsible for delivery and must instruct beneficiaries on use of Medicare covered items, and maintain proof of delivery. A supplier must answer questions, and respond to complaints of the beneficiaries, and maintain documentation of such contacts. A supplier must maintain and replace at no charge or repair directly, or through a service contract with another company, Medicare covered items it has rented to beneficiaries. A supplier must accept returns of substandard (less than full quality for the particular item) or unsuitable items (inappropriate for the beneficiary at the time it was fitted and rented or sold) from beneficiaries. A supplier must disclose these supplier standards to each beneficiary to whom it supplies a Medicare-covered item. A supplier must disclose to the government any person having ownership, financial, or control interest in the supplier. A supplier must not convey or reassign a supplier number; i.e., the supplier may not sell or allow another entity to use its Medicare billing number. A supplier must have a complaint resolution protocol established to address beneficiary complaints that relate to these standards. A record of these complaints must be maintained at the physical facility. Complaint records must include: the name, address, telephone number and health insurance claim number of the beneficiary, a summary of the complaint, and any action taken to resolve it. A supplier must agree to furnish CMS any information required by the Medicare statute and implementing regulations. A supplier of DMEPOS and other items and services must be accredited by a CMS-approved accreditation organization in order to receive and retain a supplier billing number.  The accreditation must indicate the specific products and services, for which the supplier is accredited in order for the supplier to receive payment for those specific products and services. A DMEPOS supplier must notify their accreditation organization when a new DMEPOS location is opened. The accreditation organization may accredit the new supplier location for three months after it is operational without requiring a new site visit. All DMEPOS supplier locations, whether owned or subcontracted, must meet the [employer] and be separately accredited in order to bill Medicare. An accredited supplier may be denied enrollment or their enrollment may be revoked, if CMS determines that they are not in compliance with the DMEPOS quality standards. A DMEPOS supplier must disclose upon enrollment all products and services, including the addition of new product lines for which they are seeking accreditation. If a new product line is added after enrollment, the DMEPOS supplier will be responsible for notifying the accrediting body of the new product so that the DMEPOS supplier can be re-surveyed and accredited for these new products. Must meet the surety bond requirements specified in 42 C. F.R. 424.57(c). Implementation date- May 4, 2009. A supplier must obtain oxygen from a state-licensed oxygen supplier. A supplier must maintain ordering and referring documentation consistent with provisions found in 42 C. F.R. 424.516(f). DMEPOS suppliers are prohibited from sharing a practice location with certain other Medicare providers and suppliers. DMEPOS suppliers must remain open to the public for a minimum of 30 hours per week with certain exceptions.

## 2022-12-06 NOTE — LETTER
[address] [place] [address],[address] [address]  Phone: [phone]  Fax: [phone]    Monique Canseco MD        December 6, 2022     Patient: Meghna Rosenbaum   YOB: 1970   Date of Visit: 12/6/2022       To Whom It May Concern:    Appointment date:  December 6, 2022    Meghna Rosenbaum was in our office for an appointment on the date listed above. Please excuse him/her from work on this day. Please see notes regarding work status:    - out of work for 6 weeks    Follow-up appointment: 5 weeks     If you have any questions or concerns, please don't hesitate to call.     Sincerely,        Monique Canseco MD

## 2022-12-06 NOTE — PROGRESS NOTES
Name: Jose Dyer  YOB: 1970  Gender: male  MRN: [id number]      CC: Results (MRI Right knee 11/30/2022)       HPI: Jose Dyer is a 47 y.o. male who returns for follow up and MRI results on  right knee  11/30/2022. He was seen by Bebe Reid at his previous appointment given injection in his right shoulder which he says is feeling much better but his right knee is in severe pain worse with prolonged standing. He stands on concrete floors for 10 to 12 hours a day. Shabana Rey Physical Examination:  General: no acute distress  Lungs: breathing easily  CV: regular rhythm by pulse  Right Knee: Tenderness to palpation over the medial femoral condyle and the medial joint line with a mild effusion he has pain to the extremes of motion with medial joint line pain Jose's testing of the medial joint line. Ligamentously stable x4    Imaging:   I reviewed plain radiographs of his knee from November 22, 2022 which demonstrate moderate joint space narrowing medial compartment no acute findings. I reviewed an MRI scan from November 30, 2020 which demonstrates radial tear the posterior horn the medial meniscus but a large area of full-thickness chondromalacia with extensive subchondral edema and subchondral insufficiency fracture of the medial femoral condyle. All imaging interpreted by myself Librado Nathan MD independent of radiology review    Assessment:     ICD-10-CM    1. Subchondral insufficiency fracture of condyle of femur, initial encounter ([facility])  M84.453A triamcinolone acetonide (KENALOG-40) injection 40 mg     HI ARTHROCENTESIS ASPIR&/INJ MAJOR JT/BURSA W/O US     Crutches ()     Crutches ()      2. Right knee pain, unspecified chronicity  M25.561       3.  Degenerative tear of medial meniscus, right  M23.203 triamcinolone acetonide (KENALOG-40) injection 40 mg     HI ARTHROCENTESIS ASPIR&/INJ MAJOR JT/BURSA W/O US           Plan:   I think the majority of his symptoms are from a subchondral insufficiency fracture in the advanced degenerative changes of the medial compartment. We discussed definitive care for this would likely be total knee arthroplasty I do not think an arthroscopy at this point would help him due to the significant bone changes. We talked about conservative management of this pain including protected weightbearing he was fit with crutches or walker today. We also discussed an intra-articular injection for some of the meniscal and intra-articular pain which she elected to proceed with today. I prescribed ergocalciferol and [place] x8 weeks for treatment of the bone involvement. I will see him back in about 5 weeks to reevaluate. The patient elected to proceed with an intraarticular knee injection today. After verbal informed consent was obtained after sterile prep the right knee  was injected from a anterior lateral approach with 4 cc of 0.25% Marcaine and 1 cc of 40 mg Kenalog. The patient tolerated the procedure well was given postinjection flare precautions. Jose Alfredo Vnag MD, [facility]

## 2022-12-16 ENCOUNTER — TELEPHONE (OUTPATIENT)
Dept: ORTHOPEDIC SURGERY | Age: 52
End: 2022-12-16

## 2023-01-16 RX ORDER — CALCIUM CARBONATE 500(1250)
TABLET ORAL
Qty: 30 TABLET | Refills: 1 | OUTPATIENT
Start: 2023-01-16

## 2023-01-16 RX ORDER — ERGOCALCIFEROL 1.25 MG/1
CAPSULE ORAL
Qty: 8 CAPSULE | Refills: 1 | OUTPATIENT
Start: 2023-01-16

## 2023-01-17 ENCOUNTER — OFFICE VISIT (OUTPATIENT)
Dept: ORTHOPEDIC SURGERY | Age: 53
End: 2023-01-17

## 2023-01-17 DIAGNOSIS — M23.203 DEGENERATIVE TEAR OF MEDIAL MENISCUS, RIGHT: ICD-10-CM

## 2023-01-17 DIAGNOSIS — M17.11 ARTHRITIS OF RIGHT KNEE: ICD-10-CM

## 2023-01-17 DIAGNOSIS — M84.451D SUBCHONDRAL INSUFFICIENCY FRACTURE OF CONDYLE OF RIGHT FEMUR WITH ROUTINE HEALING, SUBSEQUENT ENCOUNTER: Primary | ICD-10-CM

## 2023-01-17 NOTE — PROGRESS NOTES
Name: Kerline St  YOB: 1970  Gender: male  MRN: [id number]      CC: Follow-up (R knee recheck)       HPI: Kerline St is a 47 y.o. male who returns for follow up on right knee pain. He reports getting 90% relief from the corticosteroid injection last visit. His notes that his pain is still present but not as severe. Continues to ambulate with a cane and notes compliance with his bone health medications. Physical Examination:  General: no acute distress  Lungs: breathing easily  CV: regular rhythm by pulse  Right Knee: Mild effusion present. Tenderness to palpation medial femoral condyle and medial joint line. Full active and passive range of motion with pain in the extremes. Negative ligamentous exam x4. Positive Jose's with reproduction of patient's symptoms medial joint line. Positive bounce home test.        Assessment:     ICD-10-CM    1. Subchondral insufficiency fracture of condyle of right femur with routine healing, subsequent encounter  M84.451D       2. Degenerative tear of medial meniscus, right  M23.203       3. Arthritis of right knee  M17.11           Plan: We reviewed his MRI images again together today. I discussed with the patient continued conservative treatment to include continued trial of nonweightbearing and continued bone health medications versus surgical intervention. Based on his osteochondral lesion with the extensive bone marrow edema and subchondral insufficiency fracture and medial compartment arthritis I do not think he would get much prolonged benefit from a knee arthroscopy. He likely would get some transient benefit but I think you will require arthroplasty in the future. He expressed his desire for the definitive surgical answer that would last. my recommendation at this time is that he be referred to total joints for consideration of a knee replacement. He may be a candidate for unicompartmental arthroplasty.   He has a job where he has to stand on his feet for 10 to 12 hours a day which I do not think is capable of currently.  We gave him a note to be out of work until March 1.  I would not be providing any further work notes for him as he will no longer be under my care.        Aidan Betancur NP dictating as a scribe for MD Librado Moody MD, FAAOS  Orthopaedics and Sports Medicine

## 2023-01-30 RX ORDER — ERGOCALCIFEROL 1.25 MG/1
CAPSULE ORAL
Qty: 8 CAPSULE | Refills: 1 | OUTPATIENT
Start: 2023-01-30

## 2023-02-01 ENCOUNTER — TELEPHONE (OUTPATIENT)
Dept: ORTHOPEDIC SURGERY | Age: 53
End: 2023-02-01

## 2023-02-01 ENCOUNTER — OFFICE VISIT (OUTPATIENT)
Dept: ORTHOPEDIC SURGERY | Age: 53
End: 2023-02-01
Payer: COMMERCIAL

## 2023-02-01 DIAGNOSIS — M84.451D SUBCHONDRAL INSUFFICIENCY FRACTURE OF CONDYLE OF RIGHT FEMUR WITH ROUTINE HEALING, SUBSEQUENT ENCOUNTER: Primary | ICD-10-CM

## 2023-02-01 PROCEDURE — 99214 OFFICE O/P EST MOD 30 MIN: CPT | Performed by: PHYSICIAN ASSISTANT

## 2023-02-01 NOTE — LETTER
[address] [address]  Phone: [phone]  Fax: [address], [place]        February 1, 2023     Patient: Bessy Saunders   YOB: 1970   Date of Visit: 2/1/2023       To Whom It May Concern: It is my medical opinion that Shay Sainz should remain out of work until 02/15/2023. If you have any questions or concerns, please don't hesitate to call.     Sincerely,        NELSON Hernandez

## 2023-02-01 NOTE — PROGRESS NOTES
Name: Vasile Terry  YOB: 1970  Gender: male  MRN: [id number]      Chief complaint:  RIGHT knee pain    History of Present Illness:     Vasile Terry is a 47 y.o. male  The pain has been present since November. He comes via referral from Dr. Tanja Tanner. He did have evaluation with x-rays as well as an MRI of the right knee showing advanced DJD as well as subchondral insufficiency fracture of the medial femoral condyle. He recently did have a cortisone injection the right knee which he states did help with his symptoms, but unfortunately his symptoms have returned and become quite severe again. He notices primarily pain over the medial joint line of the right knee. The patient describes the quality of the pain as a deep ache. The symptoms are exacerbated by weight bearing, walking and standing for long periods of time. The pain is also exacerbated by ascending and descending stairs, getting up and down from a chair. They deny any previous surgery on the knee. Previous treatment includes anti-inflammatories, use of cane/walker and activity modification. These treatment options have not helped. He does state that he has  disability at 100%. He has not worked since November because of right knee pain. Past Medical History: Allergies:  No Known Allergies    Medications:  Current Outpatient Medications   Medication Sig    calcium elemental (OSCAL) 500 MG TABS tablet Take 1 tablet by mouth daily    Vitamin D, Ergocalciferol, 38798 units CAPS Take 1 capsule by mouth Twice a Week    amLODIPine-benazepril (LOTREL) 10-20 MG per capsule Take 1 capsule by mouth daily    chlorthalidone (HYGROTON) 25 MG tablet Take 1 tablet by mouth daily    lansoprazole (PREVACID) 30 MG delayed release capsule Take 1 capsule by mouth daily Takes every morning.  Indications: gastroesophageal reflux disease    meloxicam (MOBIC) 15 MG tablet Take 1 tablet by mouth daily    pravastatin (PRAVACHOL) 40 MG tablet Take 1 tablet by mouth daily    diclofenac sodium (VOLTAREN) 1 % GEL Apply 4 g topically 4 times daily as needed for Pain    sucralfate (CARAFATE) 1 GM tablet Take 1 tablet by mouth 4 times daily     No current facility-administered medications for this visit. Past Medical history:  Past Medical History:   Diagnosis Date    Arthritis of right acromioclavicular joint 09/29/2022    Borderline high cholesterol     controlled with medication    Bursitis 2020    Chronic pain     Depression, major, in remission ([facility] [address] 8/25/2015    GERD (gastroesophageal reflux disease)     GERD (gastroesophageal reflux disease)     controlled with medication    Hypercholesterolemia     Hypertension     controlled with medication    Morbid obesity ([facility] [address]     BMI- 35.2  (9/20/18)- verbal    Osteoarthritis 2017    Sleep apnea     BMI- 35.        has a past surgical history that includes pr unlisted procedure abdomen peritoneum & omentum (2012); Foot surgery (August2019, again Oct 2020); and shoulder surgery (Sept 2018). Family History:  [unfilled]     Social History:  [unfilled]    Review of Systems:       Physical Exam:    General:   Alert and oriented    Gait:          Normal gait    Back:        No tenderness over lower back. RIGHT Hip:    Skin is intact. No pain with palpation over the greater trochanter.           No groin pain and restricted ROM of the hip     RIGHT Knee: Skin: normal                    Effusion: no                    Tenderness to palpation: Diffuse over the medial joint line                    Patella grind test: Positive                    Stability:  Valgus: yes Varus: yes AP: yes                    Jose's test: negative                    Quad Strength: good                    ROM   Extension: 0 Flexion: 125                    Popliteal cyst : no                    Calf pain : no                    Edema left leg: none noted    Neurovascular:  Patient has good pulses distally. They have intact motor and sensory function. X-rays of the right knee reviewed from November that do show significant medial space narrowing especially on skiers view. Also shows evidence of an osteochondral lesion over the medial femoral condyle on the AP view. MRI right knee:  1. Findings suggestive of subchondral insufficiency fracture of the   weightbearing surface medial femoral condyle. 2.  High-grade radial tear of the posterior root medial meniscus. 3.  Moderate to advanced degenerative changes of the medial compartment with   diffuse high-grade chondral loss. 4.  Edema-like signal within the distal biceps femoris muscle, which may   indicate low-grade muscle strain. Diagnoses: Moderate to advanced right knee DJD with medial femoral condyle subchondral insufficiency fracture    Plan:    The x-ray and MRI findings were again discussed with the patient. He voiced understanding that he does have quite severe degenerative changes, including the insufficiency fracture, that are causing his symptoms. Given their progressing symptoms and radiographic findings they are indicated for a RIGHTTKA and he was counseled this is a definitive option for treatment. We did briefly discussed the benefits versus risks of doing a partial versus a total knee replacement. After discussion he also was in agreement to proceed with a right TKA. They would like to proceed with surgical planning for their RIGHT knee. We did go over potential risks and benefits of procedure with the patient and she understands them and consents to surgery. We will plan on using [employer] robot assistance and use [employer] implants. Implant types and the reason they are used were discussed with the patient today while discussing pertinent details of their surgery. The patient will be a category 1 in complexity.  This reflects my expectation for surgical time and difficulty but does not indicate the overall complexity of the patient's care. He will plan on going home same day of surgery and the details of this were discussed as well. His work situation was also discussed and he will be out of work until 2/15/2023. we will otherwise proceed with surgical planning as mentioned above.        NELSON Roy

## 2023-02-02 ENCOUNTER — TELEPHONE (OUTPATIENT)
Dept: ORTHOPEDIC SURGERY | Age: 53
End: 2023-02-02

## 2023-02-14 DIAGNOSIS — M25.561 RIGHT KNEE PAIN, UNSPECIFIED CHRONICITY: Primary | ICD-10-CM

## 2023-02-14 PROBLEM — M17.11 OSTEOARTHRITIS OF RIGHT KNEE: Status: ACTIVE | Noted: 2023-02-14

## 2023-02-21 RX ORDER — CALCIUM CARBONATE 500(1250)
TABLET ORAL
Qty: 30 TABLET | Refills: 1 | OUTPATIENT
Start: 2023-02-21

## 2023-03-22 ENCOUNTER — OFFICE VISIT (OUTPATIENT)
Dept: FAMILY MEDICINE CLINIC | Facility: CLINIC | Age: 53
End: 2023-03-22

## 2023-03-22 VITALS
SYSTOLIC BLOOD PRESSURE: 110 MMHG | WEIGHT: 245 LBS | BODY MASS INDEX: 35.07 KG/M2 | DIASTOLIC BLOOD PRESSURE: 70 MMHG | HEIGHT: 70 IN

## 2023-03-22 DIAGNOSIS — E78.2 HYPERLIPEMIA, MIXED: ICD-10-CM

## 2023-03-22 DIAGNOSIS — E83.42 HYPOMAGNESEMIA: ICD-10-CM

## 2023-03-22 DIAGNOSIS — I10 ESSENTIAL (PRIMARY) HYPERTENSION: ICD-10-CM

## 2023-03-22 DIAGNOSIS — I10 HYPERTENSION, UNCONTROLLED: Primary | ICD-10-CM

## 2023-03-22 DIAGNOSIS — F32.5 DEPRESSION, MAJOR, IN REMISSION (HCC): ICD-10-CM

## 2023-03-22 DIAGNOSIS — N52.9 ERECTILE DYSFUNCTION, UNSPECIFIED ERECTILE DYSFUNCTION TYPE: ICD-10-CM

## 2023-03-22 DIAGNOSIS — E66.01 SEVERE OBESITY (BMI 35.0-39.9) WITH COMORBIDITY (HCC): ICD-10-CM

## 2023-03-22 DIAGNOSIS — R73.9 HIGH BLOOD SUGAR: ICD-10-CM

## 2023-03-22 LAB
ALBUMIN SERPL-MCNC: 3.7 G/DL (ref 3.5–5)
ALBUMIN/GLOB SERPL: 1.1 (ref 0.4–1.6)
ALP SERPL-CCNC: 85 U/L (ref 50–136)
ALT SERPL-CCNC: 31 U/L (ref 12–65)
ANION GAP SERPL CALC-SCNC: 3 MMOL/L (ref 2–11)
AST SERPL-CCNC: 21 U/L (ref 15–37)
BASOPHILS # BLD: 0 K/UL (ref 0–0.2)
BASOPHILS NFR BLD: 0 % (ref 0–2)
BILIRUB SERPL-MCNC: 0.5 MG/DL (ref 0.2–1.1)
BUN SERPL-MCNC: 17 MG/DL (ref 6–23)
CALCIUM SERPL-MCNC: 9.6 MG/DL (ref 8.3–10.4)
CHLORIDE SERPL-SCNC: 108 MMOL/L (ref 101–110)
CHOLEST SERPL-MCNC: 171 MG/DL
CO2 SERPL-SCNC: 30 MMOL/L (ref 21–32)
CREAT SERPL-MCNC: 1 MG/DL (ref 0.8–1.5)
DIFFERENTIAL METHOD BLD: NORMAL
EOSINOPHIL # BLD: 0.1 K/UL (ref 0–0.8)
EOSINOPHIL NFR BLD: 1 % (ref 0.5–7.8)
ERYTHROCYTE [DISTWIDTH] IN BLOOD BY AUTOMATED COUNT: 13.7 % (ref 11.9–14.6)
EST. AVERAGE GLUCOSE BLD GHB EST-MCNC: 120 MG/DL
GLOBULIN SER CALC-MCNC: 3.5 G/DL (ref 2.8–4.5)
GLUCOSE SERPL-MCNC: 96 MG/DL (ref 65–100)
HBA1C MFR BLD: 5.8 % (ref 4.8–5.6)
HCT VFR BLD AUTO: 46 % (ref 41.1–50.3)
HDLC SERPL-MCNC: 67 MG/DL (ref 40–60)
HDLC SERPL: 2.6
HGB BLD-MCNC: 14.8 G/DL (ref 13.6–17.2)
IMM GRANULOCYTES # BLD AUTO: 0 K/UL (ref 0–0.5)
IMM GRANULOCYTES NFR BLD AUTO: 0 % (ref 0–5)
LDLC SERPL CALC-MCNC: 84.8 MG/DL
LYMPHOCYTES # BLD: 2.1 K/UL (ref 0.5–4.6)
LYMPHOCYTES NFR BLD: 25 % (ref 13–44)
MCH RBC QN AUTO: 29.2 PG (ref 26.1–32.9)
MCHC RBC AUTO-ENTMCNC: 32.2 G/DL (ref 31.4–35)
MCV RBC AUTO: 90.9 FL (ref 82–102)
MONOCYTES # BLD: 0.7 K/UL (ref 0.1–1.3)
MONOCYTES NFR BLD: 9 % (ref 4–12)
NEUTS SEG # BLD: 5.4 K/UL (ref 1.7–8.2)
NEUTS SEG NFR BLD: 65 % (ref 43–78)
NRBC # BLD: 0 K/UL (ref 0–0.2)
PLATELET # BLD AUTO: 302 K/UL (ref 150–450)
PMV BLD AUTO: 9.6 FL (ref 9.4–12.3)
POTASSIUM SERPL-SCNC: 4.6 MMOL/L (ref 3.5–5.1)
PROT SERPL-MCNC: 7.2 G/DL (ref 6.3–8.2)
RBC # BLD AUTO: 5.06 M/UL (ref 4.23–5.6)
SODIUM SERPL-SCNC: 141 MMOL/L (ref 133–143)
TRIGL SERPL-MCNC: 96 MG/DL (ref 35–150)
VLDLC SERPL CALC-MCNC: 19.2 MG/DL (ref 6–23)
WBC # BLD AUTO: 8.3 K/UL (ref 4.3–11.1)

## 2023-03-22 PROCEDURE — 3078F DIAST BP <80 MM HG: CPT | Performed by: FAMILY MEDICINE

## 2023-03-22 PROCEDURE — 3074F SYST BP LT 130 MM HG: CPT | Performed by: FAMILY MEDICINE

## 2023-03-22 PROCEDURE — 99214 OFFICE O/P EST MOD 30 MIN: CPT | Performed by: FAMILY MEDICINE

## 2023-03-22 SDOH — ECONOMIC STABILITY: FOOD INSECURITY: WITHIN THE PAST 12 MONTHS, THE FOOD YOU BOUGHT JUST DIDN'T LAST AND YOU DIDN'T HAVE MONEY TO GET MORE.: NEVER TRUE

## 2023-03-22 SDOH — ECONOMIC STABILITY: FOOD INSECURITY: WITHIN THE PAST 12 MONTHS, YOU WORRIED THAT YOUR FOOD WOULD RUN OUT BEFORE YOU GOT MONEY TO BUY MORE.: NEVER TRUE

## 2023-03-22 SDOH — ECONOMIC STABILITY: INCOME INSECURITY: HOW HARD IS IT FOR YOU TO PAY FOR THE VERY BASICS LIKE FOOD, HOUSING, MEDICAL CARE, AND HEATING?: VERY HARD

## 2023-03-22 SDOH — ECONOMIC STABILITY: HOUSING INSECURITY
IN THE LAST 12 MONTHS, WAS THERE A TIME WHEN YOU DID NOT HAVE A STEADY PLACE TO SLEEP OR SLEPT IN A SHELTER (INCLUDING NOW)?: NO

## 2023-03-22 ASSESSMENT — PATIENT HEALTH QUESTIONNAIRE - PHQ9
SUM OF ALL RESPONSES TO PHQ9 QUESTIONS 1 & 2: 0
1. LITTLE INTEREST OR PLEASURE IN DOING THINGS: 0
SUM OF ALL RESPONSES TO PHQ QUESTIONS 1-9: 0
2. FEELING DOWN, DEPRESSED OR HOPELESS: 0
SUM OF ALL RESPONSES TO PHQ QUESTIONS 1-9: 0

## 2023-03-22 ASSESSMENT — ENCOUNTER SYMPTOMS
FACIAL SWELLING: 0
SHORTNESS OF BREATH: 0
SINUS PAIN: 0
COUGH: 0
APNEA: 0
EYE ITCHING: 0
ABDOMINAL PAIN: 0
EYE PAIN: 0
BLURRED VISION: 0
CHEST TIGHTNESS: 0
EYE REDNESS: 0
SINUS PRESSURE: 0
ORTHOPNEA: 0
EYE DISCHARGE: 0
RHINORRHEA: 0
BLOOD IN STOOL: 0
ANAL BLEEDING: 0
ABDOMINAL DISTENTION: 0
BACK PAIN: 1

## 2023-03-22 NOTE — PROGRESS NOTES
Complaint   Patient presents with    Hypertension    Gastroesophageal Reflux    Cholesterol Problem    Erectile Dysfunction    Mental Health Problem         Review of Systems:    Review of Systems   Constitutional:  Positive for malaise/fatigue. Negative for activity change, appetite change, chills, diaphoresis, fatigue and fever. HENT:  Negative for congestion, ear discharge, ear pain, facial swelling, hearing loss, mouth sores, rhinorrhea, sinus pressure and sinus pain. Eyes:  Negative for blurred vision, pain, discharge, redness and itching. Respiratory:  Negative for apnea, cough, chest tightness and shortness of breath. Cardiovascular:  Negative for chest pain, palpitations, orthopnea, leg swelling and PND. Gastrointestinal:  Negative for abdominal distention, abdominal pain, anal bleeding and blood in stool. Endocrine: Negative for cold intolerance and heat intolerance. Genitourinary:  Negative for difficulty urinating, dysuria, enuresis, flank pain, frequency and hematuria. Musculoskeletal:  Positive for back pain. Negative for arthralgias, gait problem, joint swelling, myalgias and neck pain. Skin:  Negative for pallor and rash. Neurological:  Negative for dizziness, facial asymmetry, light-headedness and headaches. Psychiatric/Behavioral:  Negative for agitation, behavioral problems, confusion and sleep disturbance. The patient is not nervous/anxious.       History:  Past Medical History:   Diagnosis Date    Arthritis of right acromioclavicular joint 09/29/2022    Borderline high cholesterol     controlled with medication    Bursitis 2020    Chronic pain     Depression, major, in remission ([facility] [address] 8/25/2015    GERD (gastroesophageal reflux disease)     GERD (gastroesophageal reflux disease)     controlled with medication    Hypercholesterolemia     Hypertension     controlled with medication    Morbid obesity ([address]     BMI- 35.2  (9/20/18)- verbal    Osteoarthritis 2017

## 2023-03-28 ENCOUNTER — HOSPITAL ENCOUNTER (OUTPATIENT)
Dept: REHABILITATION | Age: 53
Discharge: HOME | End: 2023-03-31
Payer: OTHER GOVERNMENT

## 2023-03-28 ENCOUNTER — HOSPITAL ENCOUNTER (OUTPATIENT)
Dept: SURGERY | Age: 53
Discharge: HOME | End: 2023-03-31
Payer: OTHER GOVERNMENT

## 2023-03-28 VITALS
OXYGEN SATURATION: 97 % | HEIGHT: 71 IN | DIASTOLIC BLOOD PRESSURE: 73 MMHG | WEIGHT: 254.2 LBS | SYSTOLIC BLOOD PRESSURE: 107 MMHG | BODY MASS INDEX: 35.59 KG/M2 | RESPIRATION RATE: 16 BRPM | HEART RATE: 81 BPM | TEMPERATURE: 98.2 F

## 2023-03-28 LAB
ANION GAP SERPL CALC-SCNC: 4 MMOL/L (ref 2–11)
BACTERIA SPEC CULT: NORMAL
BUN SERPL-MCNC: 15 MG/DL (ref 6–23)
CALCIUM SERPL-MCNC: 9.3 MG/DL (ref 8.3–10.4)
CHLORIDE SERPL-SCNC: 109 MMOL/L (ref 101–110)
CO2 SERPL-SCNC: 29 MMOL/L (ref 21–32)
CREAT SERPL-MCNC: 1.01 MG/DL (ref 0.8–1.5)
EKG ATRIAL RATE: 70 BPM
EKG DIAGNOSIS: NORMAL
EKG P AXIS: 63 DEGREES
EKG P-R INTERVAL: 160 MS
EKG Q-T INTERVAL: 346 MS
EKG QRS DURATION: 86 MS
EKG QTC CALCULATION (BAZETT): 373 MS
EKG R AXIS: 96 DEGREES
EKG T AXIS: -21 DEGREES
EKG VENTRICULAR RATE: 70 BPM
ERYTHROCYTE [DISTWIDTH] IN BLOOD BY AUTOMATED COUNT: 13.2 % (ref 11.9–14.6)
GLUCOSE SERPL-MCNC: 129 MG/DL (ref 65–100)
HCT VFR BLD AUTO: 43.2 % (ref 41.1–50.3)
HGB BLD-MCNC: 14.1 G/DL (ref 13.6–17.2)
MCH RBC QN AUTO: 28.7 PG (ref 26.1–32.9)
MCHC RBC AUTO-ENTMCNC: 32.6 G/DL (ref 31.4–35)
MCV RBC AUTO: 88 FL (ref 82–102)
NRBC # BLD: 0 K/UL (ref 0–0.2)
PLATELET # BLD AUTO: 283 K/UL (ref 150–450)
PMV BLD AUTO: 9.5 FL (ref 9.4–12.3)
POTASSIUM SERPL-SCNC: 3.7 MMOL/L (ref 3.5–5.1)
RBC # BLD AUTO: 4.91 M/UL (ref 4.23–5.6)
SERVICE CMNT-IMP: NORMAL
SODIUM SERPL-SCNC: 142 MMOL/L (ref 133–143)
WBC # BLD AUTO: 8.5 K/UL (ref 4.3–11.1)

## 2023-03-28 PROCEDURE — 80048 BASIC METABOLIC PNL TOTAL CA: CPT

## 2023-03-28 PROCEDURE — 85027 COMPLETE CBC AUTOMATED: CPT

## 2023-03-28 PROCEDURE — 94760 N-INVAS EAR/PLS OXIMETRY 1: CPT

## 2023-03-28 PROCEDURE — 93005 ELECTROCARDIOGRAM TRACING: CPT | Performed by: ANESTHESIOLOGY

## 2023-03-28 PROCEDURE — 87641 MR-STAPH DNA AMP PROBE: CPT

## 2023-03-28 PROCEDURE — 97161 PT EVAL LOW COMPLEX 20 MIN: CPT

## 2023-03-28 RX ORDER — BUSPIRONE HYDROCHLORIDE 15 MG/1
15 TABLET ORAL 2 TIMES DAILY
COMMUNITY

## 2023-03-28 RX ORDER — SPIRONOLACTONE 25 MG/1
25 TABLET ORAL DAILY
COMMUNITY

## 2023-03-28 ASSESSMENT — PROMIS GLOBAL HEALTH SCALE
IN GENERAL, PLEASE RATE HOW WELL YOU CARRY OUT YOUR USUAL SOCIAL ACTIVITIES (INCLUDES ACTIVITIES AT HOME, AT WORK, AND IN YOUR COMMUNITY, AND RESPONSIBILITIES AS A PARENT, CHILD, SPOUSE, EMPLOYEE, FRIEND, ETC) [ON A SCALE OF 1 (POOR) TO 5 (EXCELLENT)]?: 5
TO WHAT EXTENT ARE YOU ABLE TO CARRY OUT YOUR EVERYDAY PHYSICAL ACTIVITIES SUCH AS WALKING, CLIMBING STAIRS, CARRYING GROCERIES, OR MOVING A CHAIR [ON A SCALE OF 1 (NOT AT ALL) TO 5 (COMPLETELY)]?: 2
IN THE PAST 7 DAYS, HOW OFTEN HAVE YOU BEEN BOTHERED BY EMOTIONAL PROBLEMS, SUCH AS FEELING ANXIOUS, DEPRESSED, OR IRRITABLE [ON A SCALE FROM 1 (NEVER) TO 5 (ALWAYS)]?: 3
IN THE PAST 7 DAYS, HOW WOULD YOU RATE YOUR FATIGUE ON AVERAGE [ON A SCALE FROM 1 (NONE) TO 5 (VERY SEVERE)]?: 3
IN THE PAST 7 DAYS, HOW WOULD YOU RATE YOUR PAIN ON AVERAGE [ON A SCALE FROM 0 (NO PAIN) TO 10 (WORST IMAGINABLE PAIN)]?: 5
SUM OF RESPONSES TO QUESTIONS 3, 6, 7, & 8: 13
IN GENERAL, HOW WOULD YOU RATE YOUR PHYSICAL HEALTH [ON A SCALE OF 1 (POOR) TO 5 (EXCELLENT)]?: 3
IN GENERAL, WOULD YOU SAY YOUR QUALITY OF LIFE IS...[ON A SCALE OF 1 (POOR) TO 5 (EXCELLENT)]: 4
IN GENERAL, HOW WOULD YOU RATE YOUR MENTAL HEALTH, INCLUDING YOUR MOOD AND YOUR ABILITY TO THINK [ON A SCALE OF 1 (POOR) TO 5 (EXCELLENT)]?: 3
IN GENERAL, WOULD YOU SAY YOUR HEALTH IS...[ON A SCALE OF 1 (POOR) TO 5 (EXCELLENT)]: 4
IN GENERAL, HOW WOULD YOU RATE YOUR SATISFACTION WITH YOUR SOCIAL ACTIVITIES AND RELATIONSHIPS [ON A SCALE OF 1 (POOR) TO 5 (EXCELLENT)]?: 2
SUM OF RESPONSES TO QUESTIONS 2, 4, 5, & 10: 12

## 2023-03-28 ASSESSMENT — PULMONARY FUNCTION TESTS
FEV1 (%PREDICTED): 71
FEV1 (LITERS): 2.25

## 2023-03-28 ASSESSMENT — KOOS JR
STRAIGHTENING KNEE FULLY: 2
BENDING TO THE FLOOR TO PICK UP OBJECT: 2
TWISING OR PIVOTING ON KNEE: 2
HOW SEVERE IS YOUR KNEE STIFFNESS AFTER FIRST WAKING IN MORNING: 2
RISING FROM SITTING: 2
GOING UP OR DOWN STAIRS: 2
STANDING UPRIGHT: 2
KOOS JR TOTAL INTERVAL SCORE: 52.465

## 2023-03-28 ASSESSMENT — PAIN DESCRIPTION - DESCRIPTORS
DESCRIPTORS: SHARP
DESCRIPTORS: ACHING

## 2023-03-28 ASSESSMENT — PAIN - FUNCTIONAL ASSESSMENT: PAIN_FUNCTIONAL_ASSESSMENT: PREVENTS OR INTERFERES SOME ACTIVE ACTIVITIES AND ADLS

## 2023-03-28 ASSESSMENT — PAIN DESCRIPTION - ORIENTATION
ORIENTATION: RIGHT;ANTERIOR;INNER
ORIENTATION: RIGHT

## 2023-03-28 ASSESSMENT — PAIN DESCRIPTION - LOCATION
LOCATION: KNEE
LOCATION: KNEE

## 2023-03-28 ASSESSMENT — PAIN SCALES - GENERAL
PAINLEVEL_OUTOF10: 5
PAINLEVEL_OUTOF10: 7

## 2023-03-28 NOTE — PROGRESS NOTES
Guard Assistance,   Min=Minimal Assistance, Mod=Moderate Assistance, Max=Maximal Assistance, Total=Total Assistance, NT=Not Tested    TREATMENT:   EVALUATION: LOW COMPLEXITY: (Untimed Charge)    TREATMENT PLAN:   Effective Dates: 3/28/2023 TO 3/28/2023. Treatment/Session Assessment:  Patient was instructed in PT- HEP to increase strength and ROM in LEs. Answered all questions. Frequency/Duration: Patient to continue to perform home exercise program at least twice per day up until his surgery. EDUCATION: Education Given To: Patient  Education Provided: Role of Therapy, Plan of Care, Home Exercise Program, Precautions  Education Method: Demonstration, Verbal, Teach Back, Printed Information/Hand-outs  Education Outcome: Verbalized understanding, Demonstrated understanding    MEDICAL NECESSITY: Mr. Lin Home is expected to optimize hislower extremity strength and ROM in preparation for joint replacement surgery. REASON FOR CONTINUED SERVICES: Achieve baseline assesment of musculoskeletal system, functional mobility and home environment. , educate in PT HEP in preparation for surgery, educate in hospital plan of care. COMPLIANCE WITH PROGRAM/EXERCISE: compliant most of the time. TOTAL TREATMENT DURATION:  Time In: 6146  Time Out: 0710  Minutes: 15    Regarding Christiano Hopson's therapy, I certify that the treatment plan above will be carried out by a therapist or under their direction.   Thank you for this referral,  Eric Sinclair, PT

## 2023-03-28 NOTE — PROGRESS NOTES
Total Joint Surgery Preoperative Chart Review      Patient ID:  Rosi Cook  [id number]  6 y.o.  1970  Surgeon: Dr. Pablo Corona  Date of Surgery: 4/20/2023  Procedure: Total Right Knee Arthroplasty  Primary Care Physician: Milagro Bullock [phone]  Specialty Physician(s):      Subjective:   Rosi Cook is a 47 y.o. Black / [place] American male who presents for preoperative evaluation for Total Right Knee arthroplasty. This is a preoperative chart review note based on data collected by the nurse at the surgical Pre-Assessment visit. Past Medical History:   Diagnosis Date    Anxiety     managed with meds    Arthritis of right acromioclavicular joint 09/29/2022    Borderline high cholesterol     controlled with medication    Bursitis 2020    Chronic pain     Depression, major, in remission ([address] 8/25/2015    GERD (gastroesophageal reflux disease)     controlled with medication    Hypercholesterolemia     Hypertension     controlled with medication    Morbid obesity ([address]     BMI- 35.2  (9/20/18)- verbal    Osteoarthritis 2017    Sleep apnea     BMI- 35. C-pap nightly. Past Surgical History:   Procedure Laterality Date    FOOT SURGERY Left August2019, again Oct 2020    IN UNLISTED PROCEDURE ABDOMEN PERITONEUM & OMENTUM  2012    umb hernia    SHOULDER SURGERY Left Sept 2018    Left scope     Family History   Problem Relation Age of Onset    Delayed Awakening Neg Hx     Post-op Nausea/Vomiting Neg Hx     Hypertension Mother     Pseudochol.  Deficiency Neg Hx     Post-op Cognitive Dysfunction Neg Hx     Hypertension Father     Malig Hypertherm Neg Hx     Emergence Delirium Neg Hx     Hypertension Sister     Other Neg Hx       Social History     Tobacco Use    Smoking status: Never    Smokeless tobacco: Never   Substance Use Topics    Alcohol use: Not Currently     Alcohol/week: 6.0 standard drinks     Types: 6 Cans of beer per week       Prior to Admission medications    Medication

## 2023-03-28 NOTE — PERIOP NOTE
All pt prescription medications need to be faxed to the [place] in [facility]. Fax number is [phone].

## 2023-03-28 NOTE — PROGRESS NOTES
03/28/23 0720   Treatment   Treatment Type Bedside spirometry   Oxygen Therapy/Pulse Ox   O2 Therapy Room air   Heart Rate 81   SpO2 94 %   Pulse Oximeter Device Mode Intermittent   $Pulse Oximeter $Spot check (single)   Bedside Spirometry   FEV-1/Actual (Liters) 2.25 Liters   FEV-1/Predicted (Liters) 71 Liters   Initial respiratory Assessment completed with pt. Pt was interviewed and evaluated in Joint camp prior to surgery. Patient ID:  Melvin Bautista  [id number]  63 y.o.  1970  Surgeon: Dr. Levy Petersen  Date of Surgery: [email or web address]  Procedure: Total Right Knee Arthroplasty  Primary Care Physician: Inocencio Boss [phone]  Specialists:     BS:DIMINISHED      Pt taught proper COUGH technique  IS REVIEWED WITH PT AS WELL AS BENEFITS OF USING IS IN SEDENTARY PTS. DIAPHRAGMATIC BREATHING EXERCISE INSTRUCTIONS GIVEN    History of smoking:   DENIES                 Quit date:         Secondhand smoke:FATHER    Past procedures with Oxygen desaturation or delayed awakening:DENIES    Past Medical History:   Diagnosis Date    Anxiety     managed with meds    Arthritis of right acromioclavicular joint 09/29/2022    Borderline high cholesterol     controlled with medication    Bursitis 2020    Chronic pain     Depression, major, in remission ([address] 8/25/2015    GERD (gastroesophageal reflux disease)     controlled with medication    Hypercholesterolemia     Hypertension     controlled with medication    Morbid obesity ([address]     BMI- 35.2  (9/20/18)- verbal    Osteoarthritis 2017    Sleep apnea     BMI- 35. C-pap nightly.     COVID 61/2022  AYALA- CPAP    Respiratory history:DENIES SOB                                                                  Respiratory meds:  DENIES    FAMILY PRESENT:             NO     PAST SLEEP STUDY:        YES                [facility]  HX OF AYALA:                        YES              2/13/2018 AHI 11 REM AHI 34.5     SAT 69%  AYALA assessment:     DANGERS OF UNTREATED AYALA

## 2023-03-28 NOTE — PERIOP NOTE
How to Use Your Incentive Spirometer       About Your Incentive Spirometer  An incentive spirometer is a device that will expand your lungs by helping you to breathe more deeply and fully. The parts of your incentive spirometer are labeled in Figure 1. Using your incentive spirometer  When you're using your incentive spirometer, make sure to breathe through your mouth. If you breathe through your nose, the incentive spirometer won't work properly. You can hold your nose if you have trouble. DO NOT BLOW INTO THE DEVICE. If you feel dizzy at any time, stop and rest. Try again at a later time. Sit upright in a chair or in bed. Hold the incentive spirometer at eye level. Put the mouthpiece in your mouth and close your lips tightly around it. Slowly breathe out (exhale) completely. Breathe in (inhale) slowly through your mouth as deeply as you can. As you take the breath, you will see the piston rise inside the large column. While the piston rises, the indicator on the right should move upwards. It should stay in between the 2 arrows (see Figure 1). Try to get the piston as high as you can, while keeping the indicator between the arrows. If the indicator doesn't stay between the arrows, you're breathing either too fast or too slow. When you get it as high as you can, hold your breath for 10 seconds, or as long as possible. While you're holding your breath, the piston will slowly fall to the base of the spirometer. Once the piston reaches the bottom of the spirometer, breathe out slowly through your mouth. Rest for a few seconds. Repeat 10 times. Try to get the piston to the same level with each breath. After each set of 10 breaths, try to cough as coughing will help loosen or clear any mucus in your lungs. Put the marker at the level the piston reached on your incentive spirometer. This will be your goal next time. Repeat these steps every hour that you're awake.   Cover the mouthpiece of the incentive

## 2023-03-28 NOTE — PERIOP NOTE
PLEASE CONTINUE TAKING ALL PRESCRIPTION MEDICATIONS UP TO THE DAY OF SURGERY UNLESS OTHERWISE DIRECTED BELOW. DISCONTINUE all vitamins, herbals and supplements 21 days prior to surgery. DISCONTINUE Non-Steriodal Anti-Inflammatory (NSAIDS) such as Advil, Ibuprofen, Motrin, Naproxen and Aleve 5 days prior to surgery. Home Medications to take  the day of surgery    Buspar, lansoprazole, pravastatin           Home Medications   to Hold   Stop meloxicam and diclofenac gel five days prior to surgery         Comments      On the day before surgery please take Acetaminophen 1000mg in the morning and then again before bed. You may substitute for Tylenol 650 mg.    Bring Dynahex wash and Incentive Spirometer with you to hospital on the day of surgery. Please do not bring home medications with you on the day of surgery unless otherwise directed by your nurse. If you are instructed to bring home medications, please give them to your nurse as they will be administered by the nursing staff. If you have any questions, please call Clifford Petty [phone]. A copy of this note was provided to the patient for reference.

## 2023-03-28 NOTE — PERIOP NOTE
Patient verified name and . Order for consent not found in EHR. Type 3 surgery, PAT Joint assessment complete. Labs per surgeon: no orders received. Labs per anesthesia protocol: CBC, BMP; results pending. EKG:Done today- abnormal; will have anesthesia review. Pt states he had an EKG years ago but does not remember where it was done. No EKG found in EHR for comparison. MRSA/MSSA swab collected; pharmacy to review and dose antibiotic as appropriate. Hospital approved surgical skin cleanser and instructions to return bottle on DOS given per hospital policy. Patient provided with handouts including Guide to Surgery, Pain Management, Hand Hygiene, Blood Transfusion Education, and [facility] Brochure. Patient answered medical/surgical history questions at their best of ability. All prior to admission medications documented in [facility]. Original medication prescription bottle not visualized during patient appointment. Patient instructed to hold all vitamins 3 weeks prior to surgery and NSAIDS 5 days prior to surgery. Patient teach back successful and patient demonstrates knowledge of instruction.

## 2023-03-28 NOTE — CARE COORDINATION
Joint Camp Case Management note:  Patient screened in Prehab for discharge planning needs. Patient scheduled for a future total joint replacement. We discussed Home Health and equipment needed after surgery. List of Home Health providers offered. Pt asking we use [employer] that [place] will pay. [facility] chosen as they have [place] contract and his wife also works for them. HH referral sent to both [facility] care Dept as well as [facility]. SOC for 4-21. Pt will need a walker and 3-1 BSC. Order obtained and faxed to [facility] with hopes they will arrange Dme prior to surgery. Spoke with patients daughter and informed her to have [facility] continue supportive therapy and have a radiation oncology RN look at her arm the next time she is there. Monitor for signs of infection. Tanya verbalized understanding.

## 2023-04-02 ENCOUNTER — PREP FOR PROCEDURE (OUTPATIENT)
Dept: ORTHOPEDIC SURGERY | Age: 53
End: 2023-04-02

## 2023-04-02 DIAGNOSIS — M17.11 PRIMARY OSTEOARTHRITIS OF RIGHT KNEE: Primary | ICD-10-CM

## 2023-04-02 RX ORDER — ACETAMINOPHEN 500 MG
1000 TABLET ORAL ONCE
Status: CANCELLED | OUTPATIENT
Start: 2023-04-02 | End: 2023-04-02

## 2023-04-17 DIAGNOSIS — M17.11 PRIMARY OSTEOARTHRITIS OF RIGHT KNEE: Primary | ICD-10-CM

## 2023-04-17 RX ORDER — ASPIRIN 81 MG/1
81 TABLET ORAL 2 TIMES DAILY
Qty: 60 TABLET | Refills: 0 | Status: SHIPPED | OUTPATIENT
Start: 2023-04-17

## 2023-04-17 RX ORDER — OXYCODONE HYDROCHLORIDE 5 MG/1
5-10 TABLET ORAL EVERY 4 HOURS PRN
Qty: 60 TABLET | Refills: 0 | Status: SHIPPED | OUTPATIENT
Start: 2023-04-17 | End: 2023-04-24

## 2023-04-17 RX ORDER — CELECOXIB 200 MG/1
200 CAPSULE ORAL DAILY
Qty: 30 CAPSULE | Refills: 0 | Status: SHIPPED | OUTPATIENT
Start: 2023-04-17

## 2023-04-17 RX ORDER — ONDANSETRON 4 MG/1
4 TABLET, FILM COATED ORAL EVERY 6 HOURS PRN
Qty: 30 TABLET | Refills: 0 | Status: SHIPPED | OUTPATIENT
Start: 2023-04-17

## 2023-04-17 RX ORDER — METHOCARBAMOL 750 MG/1
TABLET, FILM COATED ORAL
Qty: 40 TABLET | Refills: 0 | Status: SHIPPED | OUTPATIENT
Start: 2023-04-17

## 2023-04-19 ENCOUNTER — ANESTHESIA EVENT (OUTPATIENT)
Dept: SURGERY | Age: 53
End: 2023-04-19
Payer: OTHER GOVERNMENT

## 2023-04-19 RX ORDER — PROCHLORPERAZINE EDISYLATE 5 MG/ML
5 INJECTION INTRAMUSCULAR; INTRAVENOUS
Status: CANCELLED | OUTPATIENT
Start: 2023-04-19 | End: 2023-04-20

## 2023-04-19 RX ORDER — ONDANSETRON 2 MG/ML
4 INJECTION INTRAMUSCULAR; INTRAVENOUS
Status: CANCELLED | OUTPATIENT
Start: 2023-04-19 | End: 2023-04-20

## 2023-04-19 RX ORDER — LABETALOL HYDROCHLORIDE 5 MG/ML
10 INJECTION, SOLUTION INTRAVENOUS
Status: CANCELLED | OUTPATIENT
Start: 2023-04-19

## 2023-04-19 RX ORDER — OXYCODONE HYDROCHLORIDE 5 MG/1
5 TABLET ORAL
Status: CANCELLED | OUTPATIENT
Start: 2023-04-19 | End: 2023-04-20

## 2023-04-19 RX ORDER — HYDRALAZINE HYDROCHLORIDE 20 MG/ML
10 INJECTION INTRAMUSCULAR; INTRAVENOUS
Status: CANCELLED | OUTPATIENT
Start: 2023-04-19

## 2023-04-20 ENCOUNTER — HOSPITAL ENCOUNTER (OUTPATIENT)
Age: 53
Discharge: HOME HEALTH | End: 2023-04-20
Attending: ORTHOPAEDIC SURGERY | Admitting: ORTHOPAEDIC SURGERY
Payer: OTHER GOVERNMENT

## 2023-04-20 ENCOUNTER — ANESTHESIA (OUTPATIENT)
Dept: SURGERY | Age: 53
End: 2023-04-20
Payer: OTHER GOVERNMENT

## 2023-04-20 VITALS
OXYGEN SATURATION: 96 % | BODY MASS INDEX: 35.12 KG/M2 | DIASTOLIC BLOOD PRESSURE: 76 MMHG | RESPIRATION RATE: 20 BRPM | HEART RATE: 74 BPM | HEIGHT: 71 IN | SYSTOLIC BLOOD PRESSURE: 137 MMHG | TEMPERATURE: 98 F | WEIGHT: 250.88 LBS

## 2023-04-20 PROBLEM — M17.11 ARTHRITIS OF RIGHT KNEE: Status: ACTIVE | Noted: 2023-04-20

## 2023-04-20 LAB — POTASSIUM BLD-SCNC: 4.5 MMOL/L (ref 3.5–5.1)

## 2023-04-20 PROCEDURE — 6370000000 HC RX 637 (ALT 250 FOR IP): Performed by: ANESTHESIOLOGY

## 2023-04-20 PROCEDURE — 7100000001 HC PACU RECOVERY - ADDTL 15 MIN: Performed by: ORTHOPAEDIC SURGERY

## 2023-04-20 PROCEDURE — 6360000002 HC RX W HCPCS: Performed by: ANESTHESIOLOGY

## 2023-04-20 PROCEDURE — 2500000003 HC RX 250 WO HCPCS: Performed by: ORTHOPAEDIC SURGERY

## 2023-04-20 PROCEDURE — 3700000001 HC ADD 15 MINUTES (ANESTHESIA): Performed by: ORTHOPAEDIC SURGERY

## 2023-04-20 PROCEDURE — 84132 ASSAY OF SERUM POTASSIUM: CPT

## 2023-04-20 PROCEDURE — 27447 TOTAL KNEE ARTHROPLASTY: CPT | Performed by: ORTHOPAEDIC SURGERY

## 2023-04-20 PROCEDURE — 97161 PT EVAL LOW COMPLEX 20 MIN: CPT

## 2023-04-20 PROCEDURE — 20985 CPTR-ASST DIR MS PX: CPT | Performed by: ORTHOPAEDIC SURGERY

## 2023-04-20 PROCEDURE — C1776 JOINT DEVICE (IMPLANTABLE): HCPCS | Performed by: ORTHOPAEDIC SURGERY

## 2023-04-20 PROCEDURE — 97535 SELF CARE MNGMENT TRAINING: CPT

## 2023-04-20 PROCEDURE — 2500000003 HC RX 250 WO HCPCS: Performed by: ANESTHESIOLOGY

## 2023-04-20 PROCEDURE — 2580000003 HC RX 258: Performed by: ANESTHESIOLOGY

## 2023-04-20 PROCEDURE — 2500000003 HC RX 250 WO HCPCS: Performed by: NURSE ANESTHETIST, CERTIFIED REGISTERED

## 2023-04-20 PROCEDURE — 6360000002 HC RX W HCPCS: Performed by: ORTHOPAEDIC SURGERY

## 2023-04-20 PROCEDURE — 3700000000 HC ANESTHESIA ATTENDED CARE: Performed by: ORTHOPAEDIC SURGERY

## 2023-04-20 PROCEDURE — 27447 TOTAL KNEE ARTHROPLASTY: CPT | Performed by: PHYSICIAN ASSISTANT

## 2023-04-20 PROCEDURE — 3600000005 HC SURGERY LEVEL 5 BASE: Performed by: ORTHOPAEDIC SURGERY

## 2023-04-20 PROCEDURE — 97165 OT EVAL LOW COMPLEX 30 MIN: CPT

## 2023-04-20 PROCEDURE — 6370000000 HC RX 637 (ALT 250 FOR IP): Performed by: PHYSICIAN ASSISTANT

## 2023-04-20 PROCEDURE — 7100000000 HC PACU RECOVERY - FIRST 15 MIN: Performed by: ORTHOPAEDIC SURGERY

## 2023-04-20 PROCEDURE — 97110 THERAPEUTIC EXERCISES: CPT

## 2023-04-20 PROCEDURE — 2580000003 HC RX 258: Performed by: ORTHOPAEDIC SURGERY

## 2023-04-20 PROCEDURE — 94760 N-INVAS EAR/PLS OXIMETRY 1: CPT

## 2023-04-20 PROCEDURE — 6360000002 HC RX W HCPCS: Performed by: NURSE ANESTHETIST, CERTIFIED REGISTERED

## 2023-04-20 PROCEDURE — 97530 THERAPEUTIC ACTIVITIES: CPT

## 2023-04-20 PROCEDURE — 2709999900 HC NON-CHARGEABLE SUPPLY: Performed by: ORTHOPAEDIC SURGERY

## 2023-04-20 PROCEDURE — 6360000002 HC RX W HCPCS: Performed by: PHYSICIAN ASSISTANT

## 2023-04-20 PROCEDURE — 3600000015 HC SURGERY LEVEL 5 ADDTL 15MIN: Performed by: ORTHOPAEDIC SURGERY

## 2023-04-20 PROCEDURE — 2720000010 HC SURG SUPPLY STERILE: Performed by: ORTHOPAEDIC SURGERY

## 2023-04-20 PROCEDURE — 2580000003 HC RX 258: Performed by: NURSE ANESTHETIST, CERTIFIED REGISTERED

## 2023-04-20 PROCEDURE — C1713 ANCHOR/SCREW BN/BN,TIS/BN: HCPCS | Performed by: ORTHOPAEDIC SURGERY

## 2023-04-20 PROCEDURE — 64447 NJX AA&/STRD FEMORAL NRV IMG: CPT | Performed by: ANESTHESIOLOGY

## 2023-04-20 PROCEDURE — 6370000000 HC RX 637 (ALT 250 FOR IP): Performed by: ORTHOPAEDIC SURGERY

## 2023-04-20 DEVICE — COMPONENT FEM PS 6 RT KNEE CMNTLS POROUS ATTUNE: Type: IMPLANTABLE DEVICE | Site: KNEE | Status: FUNCTIONAL

## 2023-04-20 DEVICE — KNEE K2 TOT HEMI ADV CMTLS IMPL CAPPED SYNTHES: Type: IMPLANTABLE DEVICE | Status: FUNCTIONAL

## 2023-04-20 DEVICE — ATTUNE RP TIB BASE SZ 6 POR: Type: IMPLANTABLE DEVICE | Site: KNEE | Status: FUNCTIONAL

## 2023-04-20 DEVICE — INSERT TIB SZ 6 THK6MM KNEE POST STBL ROT PLATFRM ATTUNE: Type: IMPLANTABLE DEVICE | Site: KNEE | Status: FUNCTIONAL

## 2023-04-20 RX ORDER — SPIRONOLACTONE 25 MG/1
25 TABLET ORAL DAILY
Status: DISCONTINUED | OUTPATIENT
Start: 2023-04-21 | End: 2023-04-20 | Stop reason: HOSPADM

## 2023-04-20 RX ORDER — SODIUM CHLORIDE 9 MG/ML
INJECTION, SOLUTION INTRAVENOUS CONTINUOUS
Status: DISCONTINUED | OUTPATIENT
Start: 2023-04-20 | End: 2023-04-20 | Stop reason: HOSPADM

## 2023-04-20 RX ORDER — AMLODIPINE BESYLATE 10 MG/1
10 TABLET ORAL DAILY
Status: DISCONTINUED | OUTPATIENT
Start: 2023-04-21 | End: 2023-04-20 | Stop reason: HOSPADM

## 2023-04-20 RX ORDER — DIPHENHYDRAMINE HYDROCHLORIDE 50 MG/ML
25 INJECTION INTRAMUSCULAR; INTRAVENOUS EVERY 6 HOURS PRN
Status: DISCONTINUED | OUTPATIENT
Start: 2023-04-20 | End: 2023-04-20 | Stop reason: HOSPADM

## 2023-04-20 RX ORDER — SODIUM CHLORIDE 9 MG/ML
INJECTION, SOLUTION INTRAVENOUS PRN
Status: DISCONTINUED | OUTPATIENT
Start: 2023-04-20 | End: 2023-04-20 | Stop reason: HOSPADM

## 2023-04-20 RX ORDER — ROPIVACAINE HYDROCHLORIDE 2 MG/ML
INJECTION, SOLUTION EPIDURAL; INFILTRATION; PERINEURAL
Status: COMPLETED | OUTPATIENT
Start: 2023-04-20 | End: 2023-04-20

## 2023-04-20 RX ORDER — SODIUM CHLORIDE, SODIUM LACTATE, POTASSIUM CHLORIDE, CALCIUM CHLORIDE 600; 310; 30; 20 MG/100ML; MG/100ML; MG/100ML; MG/100ML
INJECTION, SOLUTION INTRAVENOUS CONTINUOUS PRN
Status: DISCONTINUED | OUTPATIENT
Start: 2023-04-20 | End: 2023-04-20 | Stop reason: SDUPTHER

## 2023-04-20 RX ORDER — GLYCOPYRROLATE 0.2 MG/ML
INJECTION INTRAMUSCULAR; INTRAVENOUS PRN
Status: DISCONTINUED | OUTPATIENT
Start: 2023-04-20 | End: 2023-04-20 | Stop reason: SDUPTHER

## 2023-04-20 RX ORDER — ROPIVACAINE HYDROCHLORIDE 2 MG/ML
INJECTION, SOLUTION EPIDURAL; INFILTRATION; PERINEURAL PRN
Status: DISCONTINUED | OUTPATIENT
Start: 2023-04-20 | End: 2023-04-20 | Stop reason: HOSPADM

## 2023-04-20 RX ORDER — OXYCODONE HYDROCHLORIDE 5 MG/1
5 TABLET ORAL EVERY 4 HOURS PRN
Status: DISCONTINUED | OUTPATIENT
Start: 2023-04-20 | End: 2023-04-20 | Stop reason: HOSPADM

## 2023-04-20 RX ORDER — SODIUM CHLORIDE 0.9 % (FLUSH) 0.9 %
5-40 SYRINGE (ML) INJECTION EVERY 12 HOURS SCHEDULED
Status: DISCONTINUED | OUTPATIENT
Start: 2023-04-20 | End: 2023-04-20 | Stop reason: HOSPADM

## 2023-04-20 RX ORDER — FENTANYL CITRATE 50 UG/ML
100 INJECTION, SOLUTION INTRAMUSCULAR; INTRAVENOUS
Status: COMPLETED | OUTPATIENT
Start: 2023-04-20 | End: 2023-04-20

## 2023-04-20 RX ORDER — EPHEDRINE SULFATE/0.9% NACL/PF 50 MG/5 ML
SYRINGE (ML) INTRAVENOUS PRN
Status: DISCONTINUED | OUTPATIENT
Start: 2023-04-20 | End: 2023-04-20 | Stop reason: SDUPTHER

## 2023-04-20 RX ORDER — PRAVASTATIN SODIUM 20 MG
40 TABLET ORAL DAILY
Status: DISCONTINUED | OUTPATIENT
Start: 2023-04-21 | End: 2023-04-20 | Stop reason: HOSPADM

## 2023-04-20 RX ORDER — NALOXONE HYDROCHLORIDE 0.4 MG/ML
0.4 INJECTION, SOLUTION INTRAMUSCULAR; INTRAVENOUS; SUBCUTANEOUS PRN
Status: DISCONTINUED | OUTPATIENT
Start: 2023-04-20 | End: 2023-04-20 | Stop reason: HOSPADM

## 2023-04-20 RX ORDER — SODIUM CHLORIDE 0.9 % (FLUSH) 0.9 %
5-40 SYRINGE (ML) INJECTION PRN
Status: DISCONTINUED | OUTPATIENT
Start: 2023-04-20 | End: 2023-04-20 | Stop reason: HOSPADM

## 2023-04-20 RX ORDER — PROPOFOL 10 MG/ML
INJECTION, EMULSION INTRAVENOUS CONTINUOUS PRN
Status: DISCONTINUED | OUTPATIENT
Start: 2023-04-20 | End: 2023-04-20 | Stop reason: SDUPTHER

## 2023-04-20 RX ORDER — MIDAZOLAM HYDROCHLORIDE 2 MG/2ML
2 INJECTION, SOLUTION INTRAMUSCULAR; INTRAVENOUS
Status: COMPLETED | OUTPATIENT
Start: 2023-04-20 | End: 2023-04-20

## 2023-04-20 RX ORDER — SODIUM CHLORIDE, SODIUM LACTATE, POTASSIUM CHLORIDE, CALCIUM CHLORIDE 600; 310; 30; 20 MG/100ML; MG/100ML; MG/100ML; MG/100ML
INJECTION, SOLUTION INTRAVENOUS CONTINUOUS
Status: DISCONTINUED | OUTPATIENT
Start: 2023-04-20 | End: 2023-04-20 | Stop reason: HOSPADM

## 2023-04-20 RX ORDER — KETOROLAC TROMETHAMINE 30 MG/ML
INJECTION, SOLUTION INTRAMUSCULAR; INTRAVENOUS PRN
Status: DISCONTINUED | OUTPATIENT
Start: 2023-04-20 | End: 2023-04-20 | Stop reason: HOSPADM

## 2023-04-20 RX ORDER — ACETAMINOPHEN 325 MG/1
650 TABLET ORAL EVERY 6 HOURS
Status: DISCONTINUED | OUTPATIENT
Start: 2023-04-20 | End: 2023-04-20 | Stop reason: HOSPADM

## 2023-04-20 RX ORDER — DEXAMETHASONE SODIUM PHOSPHATE 10 MG/ML
INJECTION INTRAMUSCULAR; INTRAVENOUS PRN
Status: DISCONTINUED | OUTPATIENT
Start: 2023-04-20 | End: 2023-04-20 | Stop reason: SDUPTHER

## 2023-04-20 RX ORDER — OXYCODONE HYDROCHLORIDE 5 MG/1
10 TABLET ORAL EVERY 4 HOURS PRN
Status: DISCONTINUED | OUTPATIENT
Start: 2023-04-20 | End: 2023-04-20 | Stop reason: HOSPADM

## 2023-04-20 RX ORDER — CHLORTHALIDONE 25 MG/1
25 TABLET ORAL DAILY
Status: DISCONTINUED | OUTPATIENT
Start: 2023-04-21 | End: 2023-04-20 | Stop reason: HOSPADM

## 2023-04-20 RX ORDER — BUSPIRONE HYDROCHLORIDE 5 MG/1
15 TABLET ORAL 2 TIMES DAILY
Status: DISCONTINUED | OUTPATIENT
Start: 2023-04-20 | End: 2023-04-20 | Stop reason: HOSPADM

## 2023-04-20 RX ORDER — PANTOPRAZOLE SODIUM 40 MG/1
40 TABLET, DELAYED RELEASE ORAL
Status: DISCONTINUED | OUTPATIENT
Start: 2023-04-21 | End: 2023-04-20 | Stop reason: HOSPADM

## 2023-04-20 RX ORDER — METHOCARBAMOL 750 MG/1
750 TABLET, FILM COATED ORAL 4 TIMES DAILY PRN
Status: DISCONTINUED | OUTPATIENT
Start: 2023-04-20 | End: 2023-04-20 | Stop reason: HOSPADM

## 2023-04-20 RX ORDER — LISINOPRIL 20 MG/1
20 TABLET ORAL DAILY
Status: DISCONTINUED | OUTPATIENT
Start: 2023-04-21 | End: 2023-04-20 | Stop reason: HOSPADM

## 2023-04-20 RX ORDER — ASPIRIN 81 MG/1
81 TABLET ORAL 2 TIMES DAILY
Status: DISCONTINUED | OUTPATIENT
Start: 2023-04-20 | End: 2023-04-20 | Stop reason: HOSPADM

## 2023-04-20 RX ORDER — MIDAZOLAM HYDROCHLORIDE 1 MG/ML
INJECTION INTRAMUSCULAR; INTRAVENOUS PRN
Status: DISCONTINUED | OUTPATIENT
Start: 2023-04-20 | End: 2023-04-20 | Stop reason: SDUPTHER

## 2023-04-20 RX ORDER — ONDANSETRON 2 MG/ML
4 INJECTION INTRAMUSCULAR; INTRAVENOUS EVERY 6 HOURS PRN
Status: DISCONTINUED | OUTPATIENT
Start: 2023-04-20 | End: 2023-04-20 | Stop reason: HOSPADM

## 2023-04-20 RX ORDER — HYDROMORPHONE HYDROCHLORIDE 1 MG/ML
0.5 INJECTION, SOLUTION INTRAMUSCULAR; INTRAVENOUS; SUBCUTANEOUS
Status: DISCONTINUED | OUTPATIENT
Start: 2023-04-20 | End: 2023-04-20 | Stop reason: HOSPADM

## 2023-04-20 RX ORDER — DEXAMETHASONE SODIUM PHOSPHATE 10 MG/ML
10 INJECTION INTRAMUSCULAR; INTRAVENOUS EVERY 6 HOURS
Status: DISCONTINUED | OUTPATIENT
Start: 2023-04-21 | End: 2023-04-20 | Stop reason: HOSPADM

## 2023-04-20 RX ORDER — LIDOCAINE HYDROCHLORIDE 20 MG/ML
INJECTION, SOLUTION EPIDURAL; INFILTRATION; INTRACAUDAL; PERINEURAL PRN
Status: DISCONTINUED | OUTPATIENT
Start: 2023-04-20 | End: 2023-04-20 | Stop reason: SDUPTHER

## 2023-04-20 RX ORDER — PROMETHAZINE HYDROCHLORIDE 25 MG/1
25 TABLET ORAL EVERY 6 HOURS PRN
Status: DISCONTINUED | OUTPATIENT
Start: 2023-04-20 | End: 2023-04-20 | Stop reason: HOSPADM

## 2023-04-20 RX ORDER — MAGNESIUM HYDROXIDE/ALUMINUM HYDROXICE/SIMETHICONE 120; 1200; 1200 MG/30ML; MG/30ML; MG/30ML
15 SUSPENSION ORAL EVERY 6 HOURS PRN
Status: DISCONTINUED | OUTPATIENT
Start: 2023-04-20 | End: 2023-04-20 | Stop reason: HOSPADM

## 2023-04-20 RX ORDER — DIPHENHYDRAMINE HCL 25 MG
25 CAPSULE ORAL EVERY 6 HOURS PRN
Status: DISCONTINUED | OUTPATIENT
Start: 2023-04-20 | End: 2023-04-20 | Stop reason: HOSPADM

## 2023-04-20 RX ORDER — TRANEXAMIC ACID 100 MG/ML
INJECTION, SOLUTION INTRAVENOUS PRN
Status: DISCONTINUED | OUTPATIENT
Start: 2023-04-20 | End: 2023-04-20 | Stop reason: SDUPTHER

## 2023-04-20 RX ORDER — ONDANSETRON 2 MG/ML
INJECTION INTRAMUSCULAR; INTRAVENOUS PRN
Status: DISCONTINUED | OUTPATIENT
Start: 2023-04-20 | End: 2023-04-20 | Stop reason: SDUPTHER

## 2023-04-20 RX ORDER — HYDROMORPHONE HYDROCHLORIDE 1 MG/ML
1 INJECTION, SOLUTION INTRAMUSCULAR; INTRAVENOUS; SUBCUTANEOUS
Status: DISCONTINUED | OUTPATIENT
Start: 2023-04-20 | End: 2023-04-20 | Stop reason: HOSPADM

## 2023-04-20 RX ORDER — LIDOCAINE HYDROCHLORIDE 10 MG/ML
1 INJECTION, SOLUTION INFILTRATION; PERINEURAL
Status: DISCONTINUED | OUTPATIENT
Start: 2023-04-20 | End: 2023-04-20 | Stop reason: HOSPADM

## 2023-04-20 RX ORDER — SENNA AND DOCUSATE SODIUM 50; 8.6 MG/1; MG/1
1 TABLET, FILM COATED ORAL 2 TIMES DAILY
Status: DISCONTINUED | OUTPATIENT
Start: 2023-04-20 | End: 2023-04-20 | Stop reason: HOSPADM

## 2023-04-20 RX ORDER — ACETAMINOPHEN 500 MG
1000 TABLET ORAL ONCE
Status: DISCONTINUED | OUTPATIENT
Start: 2023-04-20 | End: 2023-04-20

## 2023-04-20 RX ORDER — AMLODIPINE BESYLATE AND BENAZEPRIL HYDROCHLORIDE 10; 20 MG/1; MG/1
1 CAPSULE ORAL DAILY
Status: DISCONTINUED | OUTPATIENT
Start: 2023-04-20 | End: 2023-04-20 | Stop reason: ALTCHOICE

## 2023-04-20 RX ORDER — ACETAMINOPHEN 500 MG
1000 TABLET ORAL ONCE
Status: COMPLETED | OUTPATIENT
Start: 2023-04-20 | End: 2023-04-20

## 2023-04-20 RX ORDER — CELECOXIB 200 MG/1
200 CAPSULE ORAL DAILY
Status: DISCONTINUED | OUTPATIENT
Start: 2023-04-20 | End: 2023-04-20 | Stop reason: HOSPADM

## 2023-04-20 RX ADMIN — Medication 3 AMPULE: at 07:53

## 2023-04-20 RX ADMIN — DEXAMETHASONE SODIUM PHOSPHATE 5 MG: 4 INJECTION, SOLUTION INTRAMUSCULAR; INTRAVENOUS at 09:07

## 2023-04-20 RX ADMIN — MIDAZOLAM HYDROCHLORIDE 2 MG: 1 INJECTION, SOLUTION INTRAMUSCULAR; INTRAVENOUS at 09:07

## 2023-04-20 RX ADMIN — OXYCODONE 10 MG: 5 TABLET ORAL at 12:50

## 2023-04-20 RX ADMIN — PHENYLEPHRINE HYDROCHLORIDE 100 MCG: 0.1 INJECTION, SOLUTION INTRAVENOUS at 09:47

## 2023-04-20 RX ADMIN — GLYCOPYRROLATE 0.2 MG: 0.2 INJECTION INTRAMUSCULAR; INTRAVENOUS at 09:48

## 2023-04-20 RX ADMIN — SODIUM CHLORIDE, SODIUM LACTATE, POTASSIUM CHLORIDE, AND CALCIUM CHLORIDE: 600; 310; 30; 20 INJECTION, SOLUTION INTRAVENOUS at 09:56

## 2023-04-20 RX ADMIN — SODIUM CHLORIDE, POTASSIUM CHLORIDE, SODIUM LACTATE AND CALCIUM CHLORIDE: 600; 310; 30; 20 INJECTION, SOLUTION INTRAVENOUS at 08:04

## 2023-04-20 RX ADMIN — TRANEXAMIC ACID 1000 MG: 100 INJECTION, SOLUTION INTRAVENOUS at 09:42

## 2023-04-20 RX ADMIN — Medication 10 MG: at 09:49

## 2023-04-20 RX ADMIN — ROPIVACAINE HYDROCHLORIDE 20 ML: 2 INJECTION, SOLUTION EPIDURAL; INFILTRATION at 09:07

## 2023-04-20 RX ADMIN — DEXAMETHASONE SODIUM PHOSPHATE 10 MG: 10 INJECTION INTRAMUSCULAR; INTRAVENOUS at 09:47

## 2023-04-20 RX ADMIN — MIDAZOLAM 2 MG: 1 INJECTION INTRAMUSCULAR; INTRAVENOUS at 10:07

## 2023-04-20 RX ADMIN — Medication 10 MG: at 09:53

## 2023-04-20 RX ADMIN — PROPOFOL 100 MCG/KG/MIN: 10 INJECTION, EMULSION INTRAVENOUS at 09:52

## 2023-04-20 RX ADMIN — ACETAMINOPHEN 1000 MG: 500 TABLET, FILM COATED ORAL at 07:51

## 2023-04-20 RX ADMIN — LIDOCAINE HYDROCHLORIDE 100 MG: 20 INJECTION, SOLUTION EPIDURAL; INFILTRATION; INTRACAUDAL; PERINEURAL at 09:52

## 2023-04-20 RX ADMIN — SENNOSIDES AND DOCUSATE SODIUM 1 TABLET: 50; 8.6 TABLET ORAL at 13:47

## 2023-04-20 RX ADMIN — MEPIVACAINE HYDROCHLORIDE 60 MG: 20 INJECTION, SOLUTION EPIDURAL; INFILTRATION at 09:43

## 2023-04-20 RX ADMIN — FENTANYL CITRATE 100 MCG: 50 INJECTION INTRAMUSCULAR; INTRAVENOUS at 09:08

## 2023-04-20 RX ADMIN — ONDANSETRON 4 MG: 2 INJECTION INTRAMUSCULAR; INTRAVENOUS at 09:47

## 2023-04-20 RX ADMIN — PROMETHAZINE HYDROCHLORIDE 25 MG: 25 TABLET ORAL at 13:47

## 2023-04-20 RX ADMIN — Medication 2 G: at 09:35

## 2023-04-20 RX ADMIN — SODIUM CHLORIDE, SODIUM LACTATE, POTASSIUM CHLORIDE, AND CALCIUM CHLORIDE: 600; 310; 30; 20 INJECTION, SOLUTION INTRAVENOUS at 09:35

## 2023-04-20 ASSESSMENT — KOOS JR
GOING UP OR DOWN STAIRS: 2
RISING FROM SITTING: 2
STANDING UPRIGHT: 2
TWISING OR PIVOTING ON KNEE: 2
HOW SEVERE IS YOUR KNEE STIFFNESS AFTER FIRST WAKING IN MORNING: 2
STRAIGHTENING KNEE FULLY: 2
BENDING TO THE FLOOR TO PICK UP OBJECT: 2
KOOS JR TOTAL INTERVAL SCORE: 52.465

## 2023-04-20 ASSESSMENT — PAIN SCALES - GENERAL
PAINLEVEL_OUTOF10: 8
PAINLEVEL_OUTOF10: 5

## 2023-04-20 ASSESSMENT — PAIN - FUNCTIONAL ASSESSMENT: PAIN_FUNCTIONAL_ASSESSMENT: 0-10

## 2023-04-20 ASSESSMENT — PAIN DESCRIPTION - ORIENTATION: ORIENTATION: RIGHT

## 2023-04-20 ASSESSMENT — PAIN DESCRIPTION - LOCATION: LOCATION: KNEE

## 2023-04-20 NOTE — INTERVAL H&P NOTE
Update History & Physical    The patient's History and Physical of April 14, 23 was reviewed with the patient and I examined the patient. There was no change. The surgical site was confirmed by the patient and me. Plan: The risks, benefits, expected outcome, and alternative to the recommended procedure have been discussed with the patient. Patient understands and wants to proceed with the procedure.      Electronically signed by NELSON Vital on 4/20/2023 at 9:12 AM

## 2023-04-20 NOTE — ANESTHESIA PROCEDURE NOTES
Spinal Block    Patient location during procedure: OR  End time: 4/20/2023 9:43 AM  Reason for block: primary anesthetic and at surgeon's request  Staffing  Performed: anesthesiologist   Anesthesiologist: Trish Carranza, DO  Spinal Block  Patient position: sitting  Prep: ChloraPrep  Patient monitoring: cardiac monitor, continuous pulse ox, continuous capnometry, frequent blood pressure checks and oxygen  Approach: midline  Location: L3/L4  Provider prep: mask and sterile gloves  Local infiltration: lidocaine  Needle  Needle type:  Sandra   Needle gauge: 25 G  Needle length: 3.5 in  Assessment  Swirl obtained: Yes  CSF: clear  Attempts: 1  Hemodynamics: stable  Additional Notes  Uneventful first pass spinal placement  Preanesthetic Checklist  Completed: patient identified, IV checked, site marked, risks and benefits discussed, surgical/procedural consents, equipment checked, pre-op evaluation, timeout performed, anesthesia consent given, oxygen available and monitors applied/VS acknowledged

## 2023-04-20 NOTE — DISCHARGE SUMMARY
CHANGED    Details   oxyCODONE (ROXICODONE) 5 MG immediate release tablet Take 1-2 tablets by mouth every 4 hours as needed for Pain for up to 7 days. Max Daily Amount: 60 mg  Qty: 60 tablet, Refills: 0    Comments: Reduce doses taken as pain becomes manageable  Associated Diagnoses: Primary osteoarthritis of right knee      ondansetron (ZOFRAN) 4 MG tablet Take 1 tablet by mouth every 6 hours as needed for Nausea or Vomiting  Qty: 30 tablet, Refills: 0    Associated Diagnoses: Primary osteoarthritis of right knee      methocarbamol (ROBAXIN-750) 750 MG tablet Take 1 tablet by mouth every 6 hours as needed for spasms. Qty: 40 tablet, Refills: 0    Associated Diagnoses: Primary osteoarthritis of right knee      aspirin EC 81 MG EC tablet Take 1 tablet by mouth in the morning and 1 tablet in the evening. Qty: 60 tablet, Refills: 0    Associated Diagnoses: Primary osteoarthritis of right knee      celecoxib (CELEBREX) 200 MG capsule Take 1 capsule by mouth daily  Qty: 30 capsule, Refills: 0    Associated Diagnoses: Primary osteoarthritis of right knee      spironolactone (ALDACTONE) 25 MG tablet Take 1 tablet by mouth daily      busPIRone (BUSPAR) 15 MG tablet Take 15 mg by mouth in the morning and at bedtime      amLODIPine-benazepril (LOTREL) 10-20 MG per capsule Take 1 capsule by mouth daily  Qty: 90 capsule, Refills: 1    Associated Diagnoses: Hypertension, uncontrolled      chlorthalidone (HYGROTON) 25 MG tablet Take 1 tablet by mouth daily  Qty: 90 tablet, Refills: 1    Associated Diagnoses: Hypertension, uncontrolled      lansoprazole (PREVACID) 30 MG delayed release capsule Take 1 capsule by mouth daily Takes every morning.  Indications: gastroesophageal reflux disease  Qty: 90 capsule, Refills: 1    Associated Diagnoses: Acute superficial gastritis with hemorrhage      pravastatin (PRAVACHOL) 40 MG tablet Take 1 tablet by mouth daily  Qty: 90 tablet, Refills: 1    Associated Diagnoses: Hyperlipemia, mixed

## 2023-04-20 NOTE — ANESTHESIA POSTPROCEDURE EVALUATION
Department of Anesthesiology  Postprocedure Note    Patient: Fide Arizmendi  MRN: [id number]  YOB: 1970  Date of evaluation: 4/20/2023      Procedure Summary     Date: 04/20/23 Room / Location: [facility] MAIN OR 06 / [facility] MAIN OR    Anesthesia Start: 0935 Anesthesia Stop: 1134    Procedure: KNEE TOTAL ARTHROPLASTY ROBOTIC-RIGHT VELYS SDD (Right: Knee) Diagnosis:       Osteoarthritis of right knee      (Osteoarthritis of right knee [M17.11])    Surgeons: Kia Charles MD Responsible Provider: Conner Valdez DO    Anesthesia Type: spinal ASA Status: 3          Anesthesia Type: No value filed.     Blade Phase I: Blade Score: 9    Blade Phase II:        Anesthesia Post Evaluation    Patient location during evaluation: PACU  Level of consciousness: awake and alert  Airway patency: patent  Nausea & Vomiting: no nausea  Complications: no  Cardiovascular status: hemodynamically stable  Respiratory status: acceptable  Hydration status: euvolemic

## 2023-04-20 NOTE — OP NOTE
operative leg. Sponge count and needle counts were correct. Zoe [employer] left the operating room     Implants:   Implant Name Type Inv.  Item Serial No.  Lot No. LRB No. Used Action   ATTUNE RP TIB BASE SZ 6 POR - [id number]  ATTUNE RP TIB BASE SZ 6 POR [id number] [facility] [id number] Right 1 Implanted   COMPONENT FEM PS 6 RT KNEE CMNTLS POROUS ATTUNE - [id number]  COMPONENT FEM PS 6 RT KNEE CMNTLS POROUS ATTUNE [id number] [facility] [id number] Right 1 Implanted   INSERT TIB SZ 6 THK6MM KNEE POST STBL ROT PLATFRM ATTUNE - [id number]  INSERT TIB SZ 6 THK6MM KNEE POST STBL ROT PLATFRM ATTUNE [id number] [facility] [place] [facility] [id number] Right 1 Implanted     Signed By: Liz Garay MD

## 2023-04-20 NOTE — PERIOP NOTE
TRANSFER - OUT REPORT:    Verbal report given to RN Dalia Reynolds on Benjamin Begun  being transferred to [facility]  for routine post-op       Report consisted of patient's Situation, Background, Assessment and   Recommendations(SBAR). Information from the following report(s) Nurse Handoff Report, Index, Adult Overview, Surgery Report, Intake/Output, and MAR was reviewed with the receiving nurse. [place] Assessment: No data recorded  Lines:   Peripheral IV 04/20/23 Left Hand (Active)   Site Assessment Clean, dry & intact 04/20/23 1139   Line Status Infusing 04/20/23 1139   Phlebitis Assessment No symptoms 04/20/23 1139   Infiltration Assessment 0 04/20/23 1139   Dressing Status Clean, dry & intact 04/20/23 1139   Dressing Type Transparent 04/20/23 1139        Opportunity for questions and clarification was provided.       Patient transported with:  O2 @ 2lpm

## 2023-04-20 NOTE — ANESTHESIA PRE PROCEDURE
pravastatin (PRAVACHOL) 40 MG tablet Take 1 tablet by mouth daily 11/17/22   Maria C Low MD   diclofenac sodium (VOLTAREN) 1 % GEL Apply 4 g topically 4 times daily as needed for Pain 11/17/22   Maria C Low MD       Current medications:    Current Facility-Administered Medications   Medication Dose Route Frequency Provider Last Rate Last Admin    lidocaine 1 % injection 1 mL  1 mL IntraDERmal Once PRN Aldona Orn Friskey, DO        lactated ringers IV soln infusion   IntraVENous Continuous Aldona Orn Erminio Filler,  mL/hr at 04/20/23 0804 New Bag at 04/20/23 0804    sodium chloride flush 0.9 % injection 5-40 mL  5-40 mL IntraVENous 2 times per day Aldona Orn Friskey, DO        sodium chloride flush 0.9 % injection 5-40 mL  5-40 mL IntraVENous PRN Aldona Orn Friskey, DO        0.9 % sodium chloride infusion   IntraVENous PRN Aldona Orn Friskey, DO        ceFAZolin (ANCEF) 2000 mg in sterile water 20 mL IV syringe  2,000 mg IntraVENous On Call to [address] [place] [address] Rd, [place]           Allergies:  No Known Allergies    Problem List:    Patient Active Problem List   Diagnosis Code    Erectile dysfunction N52.9    GERD (gastroesophageal reflux disease) K21.9    Hyperlipemia, mixed E78.2    Depression, major, in remission ([facility] F32.5    Chronic lower back pain M54.50, G89.29    Hypertension I10    Chronic mid back pain M54.9, G89.29    Hypertension, uncontrolled I10    Arthritis of right acromioclavicular joint M19.011    Acute bilateral low back pain with right-sided sciatica M54.41    Osteoarthritis of right knee M17.11    Arthritis of right knee M17.11       Past Medical History:        Diagnosis Date    Anxiety     managed with meds    Arthritis of right acromioclavicular joint 09/29/2022    Borderline high cholesterol     controlled with medication    Bursitis 2020    Chronic pain     Depression, major, in remission ([facility] 8/25/2015    GERD (gastroesophageal reflux

## 2023-04-20 NOTE — RT PROTOCOL NOTE
the earlobes. The face masks are better for patients who are dyspneic and tachypneic as they tend to mouth breathe with a nasal mask and reduce the effectiveness of the ventilation. Masks that are too tight are uncomfortable and may cause pressure areas. Masks that are too loose leak which reduce the efficiency of the system. When using a nasal mask the patient must keep their mouth closed to obtain the desired effect of the ventilation. May place an Allevyn Gentle Border dressing over bridge of nose to avoid skin breakdown. VII. Procedure for Non-invasive ventilation via Vivo 50:   Refer to 's recommendations. VIII. Procedure for non-invasive ventilation using the V60 or BiPAP ® Vision:            Refer to [facility] recommendations. IX. Monitoring:  While in use, the RCP should check the patient and non-invasive unit no less than every four hours. Failure of NIV should be suspected if:   The patient is unable to maintain adequate oxygenation, decreasing 02 saturations despite increases in 02. There is a reduction in neurological or conscious state. The patient has excessive secretions or increasing respiratory rate. Failure of PaCO2 or PH to improve on ABG sample. The patient has poorly compliant lungs. X. Weaning               A. Weaning or cessation of non-invasive ventilation should occur under the following                        conditions:  PaC02 returns to normal and patient maintains O2 saturations with minimal                oxygen. Respiratory rate is returned within normal limits. Patient is unable to tolerate non-invasive ventilation. Patient's dyspnea is reduced. Patient exhibits normal overnight ventilation. Patient is receiving palliative treatment. XI. Documentation:  Documentation should include the following:  Ventilator settings comply with physician order.   Ventilator is functioning properly as evidenced by a check of measured

## 2023-04-20 NOTE — CARE COORDINATION
Patient is a 47y.o. year old male admitted for Right TKA . Patient plans to return home on discharge. Order received to arrange home health. Patient's wife works for [employer] requesting we use them. ... We sent initial referral in  prehab. Refferral sent to [facility]. [facility] is [facility] - [facility] for [facility] and DME were sent to  [facility] in [place] [address]. DME is not scheduled for delivery until tomorrow 4-21. Pt given walker loaner. .  Will follow until discharge. 04/20/23 6137   Service Assessment   Patient Orientation Alert and Oriented   Cognition Alert   History Provided By Patient   Primary Caregiver Self   Support Systems Spouse/Significant Other   Patient's Healthcare Decision Maker is: Legal Next of Kin   PCP Verified by CM Yes   Last Visit to PCP Within last 3 months   Prior Functional Level Independent in ADLs/IADLs   Current Functional Level Independent in ADLs/IADLs   Can patient return to prior living arrangement Yes   Ability to make needs known: Good   Family able to assist with home care needs: Yes   Would you like for me to discuss the discharge plan with any other family members/significant others, and if so, who? No   Financial Resources None   Community Resources None   Social/Functional History   Lives With Spouse   Services At/After Discharge   Transition of Care Consult (CM Consult) Home Health   Internal Home Health No   Reason Outside Agency Chosen Managed care specific requirement;Script used patient chose alternate agency   Services At/After Discharge Home Health;PT   Mode of Transport at Discharge Self   Condition of Participation: Discharge Planning   The Plan for Transition of Care is related to the following treatment goals: improve mobility   The Patient and/or Patient Representative was provided with a Choice of Provider? Patient   The Patient and/Or Patient Representative agree with the Discharge Plan?  Yes   Freedom of Choice list was provided with basic dialogue that supports the patient's

## 2023-04-20 NOTE — ANESTHESIA PROCEDURE NOTES
Peripheral Block    Patient location during procedure: pre-op  Reason for block: post-op pain management and at surgeon's request  Start time: 4/20/2023 9:07 AM  End time: 4/20/2023 9:10 AM  Staffing  Performed: anesthesiologist   Anesthesiologist: Tom Moore DO  Preanesthetic Checklist  Completed: patient identified, IV checked, site marked, risks and benefits discussed, surgical/procedural consents, equipment checked, pre-op evaluation, timeout performed, anesthesia consent given, oxygen available and monitors applied/VS acknowledged  Peripheral Block   Patient position: supine  Prep: ChloraPrep  Provider prep: mask and sterile gloves  Patient monitoring: cardiac monitor, continuous pulse ox, frequent blood pressure checks, responsive to questions and oxygen  Block type: Femoral  Adductor canal  Laterality: right  Injection technique: single-shot  Guidance: ultrasound guided  Local infiltration: lidocaine  Infiltration strength: 1 %  Local infiltration: lidocaine  Dose: 3 mL    Needle   Needle type: insulated echogenic nerve stimulator needle   Needle gauge: 20 G  Needle localization: ultrasound guidance  Needle length: 10 cm  Assessment   Injection assessment: negative aspiration for heme, no paresthesia on injection, local visualized surrounding nerve on ultrasound and no intravascular symptoms  Paresthesia pain: none  Slow fractionated injection: yes  Hemodynamics: stable  Real-time US image taken/store: yes  Outcomes: uncomplicated and patient tolerated procedure well    Additional Notes  R/B/I discussed at length with pt including damage to nerve or muscle. Needle inserted under real time Ultrasound guidance and placed in close proximity to nerve being blocked. Ultrasound was used in real time to visualize spread of local anesthetic around nerve being blocked.   Nerve and surrounding structures appeared grossly normal.  A permanent Ultrasound image was stored in the chart  Medications

## 2023-04-20 NOTE — PROGRESS NOTES
Discharge instructions reviewed and copy provided for pt. Opportunity provided for questions. Iv was removed without difficulty.
Pt received to room. Pt alert and oriented. Pt oriented to room and bed controls. Spouse at bedside.
TRANSFER - IN REPORT:    Verbal report received from [place], RN on Lyly Brannon  being received from PACU for routine post-op      Report consisted of patient's Situation, Background, Assessment and   Recommendations(SBAR). Information from the following report(s) Nurse Handoff Report was reviewed with the receiving nurse. Opportunity for questions and clarification was provided. Assessment completed upon patient's arrival to unit and care assumed.
Toilet [] [] [] [] [] [] [] [] [] [] []     [] [] [] [] [] [] [] [] [] [] []    I=Independent, Mod I=Modified Independent, S=Supervision, SBA=Standby Assistance, CGA=Contact Guard Assistance,   Min=Minimal Assistance, Mod=Moderate Assistance, Max=Maximal Assistance, Total=Total Assistance, NT=Not Tested    BALANCE: Good Fair+ Fair Fair- Poor NT Comments   Sitting Static [x] [] [] [] [] []    Sitting Dynamic [x] [] [] [] [] []              Standing Static [] [x] [] [] [] [] With RW   Standing Dynamic [] [x] [x] [] [] [] With RW     PLAN:   FREQUENCY AND DURATION: BID for duration of hospital stay or until stated goals are met, whichever comes first.    THERAPY PROGNOSIS: Good    PROBLEM LIST:   (Skilled intervention is medically necessary to address:)  Decreased ADL/Functional Activities, Decreased Activity Tolerance, Decreased AROM/PROM, Decreased Gait Ability, Decreased Strength, Decreased Transfer Abilities, and Increased Pain   INTERVENTIONS PLANNED:   (Benefits and precautions of physical therapy have been discussed with the patient.)  Therapeutic Activity, Therapeutic Exercise/HEP, Gait Training, Modalities, and Education       TREATMENT:   EVALUATION: LOW COMPLEXITY: (Untimed Charge)    TREATMENT:   Therapeutic Activity (15 Minutes): Therapeutic activity included Transfer Training, Ambulation on level ground, Stair Training, Sitting balance , Standing balance, and review of PT D/C sheet to improve functional Activity tolerance, Balance, Coordination, Mobility, and Strength. Therapeutic Exercise (10 Minutes): Therapeutic exercises noted below to improve functional AROM and strength.      TREATMENT GRID:  THERAPEUTIC  EXERCISES: DATE:  4/20/23 DATE:   DATE:      AM PM AM PM AM PM    [] [] [] [] [] []   Ankle Pumps  10       Quad Sets  10       Gluteal Sets  10       Hip Abd/ADduction  10       Straight Leg Raises  10 AA       Knee Slides  10       Short Arc Quads  10 AA       Chair Slides
Family Education  Education Method: Demonstration, Verbal  Barriers to Learning: None  Education Outcome: Demonstrated understanding, Verbalized understanding  [x] Safe And Effective Hygiene  [x] Fall Precautions  [] Hip Precautions  [] D/C Instruction Review [] Prosthesis Review  [x] Walker Management/Safety  [x] Adaptive Equipment as Needed  [x] Therapeutic Resting Position of Joint       TOTAL TREATMENT DURATION AND TIME:  Time In: 1330  Time Out: [facility]  Minutes: [address] Nolan Fernandez, OT

## 2023-04-20 NOTE — DISCHARGE INSTRUCTIONS
knee replacement. Rehab will help you strengthen the muscles of the knee and help you regain movement. After you recover, your artificial knee will allow you to do normal daily activities with less pain or no pain at all. You may be able to hike, dance, or ride a bike. Talk to your doctor about whether you can do more strenuous activities. Always tell your caregivers that you have an artificial knee. How long it will take to walk on your own, return to normal activities, and go back to work depends on your health and how well your rehabilitation (rehab) program goes. The better you do with your rehab exercises, the quicker you will get your strength and movement back. This care sheet gives you a general idea about how long it will take for you to recover. But each person recovers at a different pace. Follow the steps below to get better as quickly as possible. How can you care for yourself at home? Activity    Rest when you feel tired. You may take a nap, but don't stay in bed all day. When you sit, use a chair with arms. You can use the arms to help you stand up. Work with your physical therapist to find the best way to exercise. What you can do as your knee heals will depend on whether your new knee is cemented or uncemented. You may not be able to do certain things for a while if your new knee is uncemented. After your knee has healed enough, you can do more strenuous activities with caution. You can golf, but you may want to use a golf cart for some time. And don't wear shoes with spikes. You can bike on a flat road or on a stationary bike. Talk to your doctor before biking uphill. Your doctor may suggest that you stay away from activities that put stress on your knee. These include tennis, badminton, contact sports like football, jumping (such as in basketball), jogging, and running. Avoid activities where you might fall. Do not sit for more than 1 hour at a time.  Get up and walk around for

## 2023-04-21 ENCOUNTER — TELEPHONE (OUTPATIENT)
Dept: ORTHOPEDICS UNIT | Age: 53
End: 2023-04-21

## 2023-04-21 NOTE — TELEPHONE ENCOUNTER
Called to follow up after TKA with SDD on 4/20/23. Doing \" pretty good. \"  Post op pain is managed. Interim [address] visit is scheduled for 1100. Reviewed importance of ambulating at least every hour during the day. Advised regarding expected increase in post op pain and swelling around POD # 3. Reviewed miralax protocol. Reviewed cool jet ice machine. No questions or concerns. Reviewed contact number for ortho navigator.

## 2023-04-26 DIAGNOSIS — M17.11 PRIMARY OSTEOARTHRITIS OF RIGHT KNEE: Primary | ICD-10-CM

## 2023-04-26 RX ORDER — OXYCODONE HYDROCHLORIDE 5 MG/1
5-10 TABLET ORAL EVERY 4 HOURS PRN
Qty: 60 TABLET | Refills: 0 | Status: SHIPPED | OUTPATIENT
Start: 2023-04-26 | End: 2023-05-03

## 2023-05-12 DIAGNOSIS — M17.11 PRIMARY OSTEOARTHRITIS OF RIGHT KNEE: ICD-10-CM

## 2023-05-15 RX ORDER — ASPIRIN 81 MG/1
TABLET, COATED ORAL
Qty: 60 TABLET | Refills: 0 | OUTPATIENT
Start: 2023-05-15

## 2023-05-22 ENCOUNTER — OFFICE VISIT (OUTPATIENT)
Dept: ORTHOPEDIC SURGERY | Age: 53
End: 2023-05-22

## 2023-05-22 DIAGNOSIS — Z96.651 HISTORY OF TOTAL KNEE ARTHROPLASTY, RIGHT: ICD-10-CM

## 2023-05-22 DIAGNOSIS — M17.11 PRIMARY OSTEOARTHRITIS OF RIGHT KNEE: Primary | ICD-10-CM

## 2023-05-22 DIAGNOSIS — Z96.651 HISTORY OF TOTAL KNEE ARTHROPLASTY, RIGHT: Primary | ICD-10-CM

## 2023-05-22 DIAGNOSIS — Z09 FOLLOW-UP EXAMINATION: ICD-10-CM

## 2023-05-22 PROCEDURE — 99024 POSTOP FOLLOW-UP VISIT: CPT | Performed by: PHYSICIAN ASSISTANT

## 2023-05-22 RX ORDER — CEPHALEXIN 500 MG/1
500 CAPSULE ORAL 2 TIMES DAILY
Qty: 14 CAPSULE | Refills: 0 | Status: SHIPPED | OUTPATIENT
Start: 2023-05-22 | End: 2023-05-29

## 2023-05-22 RX ORDER — OXYCODONE HYDROCHLORIDE 5 MG/1
5-10 TABLET ORAL EVERY 4 HOURS PRN
Qty: 60 TABLET | Refills: 0 | Status: SHIPPED | OUTPATIENT
Start: 2023-05-22 | End: 2023-05-29

## 2023-05-22 RX ORDER — DICLOFENAC SODIUM 75 MG/1
75 TABLET, DELAYED RELEASE ORAL 2 TIMES DAILY
Qty: 60 TABLET | Refills: 0 | Status: SHIPPED | OUTPATIENT
Start: 2023-05-22

## 2023-05-22 NOTE — PROGRESS NOTES
Name: Mimi Reeves  YOB: 1970  Gender: male  MRN: [id number]    RIGHT Post-Op TKA 4 week follow up    This patient returns now four week status post s/p TKA. Things have gone reasonably well with therapy and the patient is now weaning from the cane. The pain level has improved. There has been no significant problem since the patient was last seen. Incision: Over the right knee there are multiple suture abscesses especially over the medial and distal aspects of the incision. There is some very mild purulent residue noted, but no active discharge. These were cleaned with hydroperoxide and after being cleaned these overall seem to be very mild. No surrounding erythema or significant swelling noted. ROM is 45 lag (passively extends to 15 degrees) to 85. The patient has minimal antalgia in stance and good alignment in stance. Radiographs are obtained. Standing AP, flexion lateral and sunrise views of the RIGHT knee demonstrates well positioned TKA with good bone implant interfaces. No significant abnormalities are seen. RADIOGRAPHIC IMPRESSION: Stable RIGHT TKA. CLINICAL IMPRESSION AND PLAN: Now four weeks s/p TKA . The patient is doing reasonably well. I have made recommendations about activity. We will ask the patient to continue to wean from the cane. Recommendations for further therapy include OUTPATIENT THERAPY FOR 3-4 WEEKS. He was given a prescription of Keflex 5 mg take twice daily for 7 days to help with his suture abscesses that are formed. The patient will follow back up in 1 week for wound recheck. He will also ultimately follow-up in 3 weeks from now for a range of motion recheck.     Work/Activity Restrictions: OUT OF WORK    [place], [place]

## 2023-05-31 ENCOUNTER — TELEPHONE (OUTPATIENT)
Dept: ORTHOPEDIC SURGERY | Age: 53
End: 2023-05-31

## 2023-05-31 DIAGNOSIS — Z96.651 HISTORY OF TOTAL KNEE ARTHROPLASTY, RIGHT: Primary | ICD-10-CM

## 2023-06-15 DIAGNOSIS — Z96.651 HISTORY OF TOTAL KNEE ARTHROPLASTY, RIGHT: Primary | ICD-10-CM

## 2023-06-20 RX ORDER — TRAMADOL HYDROCHLORIDE 50 MG/1
50-100 TABLET ORAL EVERY 4 HOURS PRN
Qty: 40 TABLET | Refills: 0 | Status: SHIPPED | OUTPATIENT
Start: 2023-06-20 | End: 2023-06-27

## 2023-07-26 ENCOUNTER — OFFICE VISIT (OUTPATIENT)
Dept: FAMILY MEDICINE CLINIC | Facility: CLINIC | Age: 53
End: 2023-07-26
Payer: OTHER GOVERNMENT

## 2023-07-26 ENCOUNTER — OFFICE VISIT (OUTPATIENT)
Dept: ORTHOPEDIC SURGERY | Age: 53
End: 2023-07-26

## 2023-07-26 VITALS
HEIGHT: 71 IN | BODY MASS INDEX: 35.28 KG/M2 | SYSTOLIC BLOOD PRESSURE: 138 MMHG | WEIGHT: 252 LBS | DIASTOLIC BLOOD PRESSURE: 70 MMHG

## 2023-07-26 DIAGNOSIS — M54.41 CHRONIC BILATERAL LOW BACK PAIN WITH BILATERAL SCIATICA: ICD-10-CM

## 2023-07-26 DIAGNOSIS — G89.29 CHRONIC BILATERAL LOW BACK PAIN WITH BILATERAL SCIATICA: ICD-10-CM

## 2023-07-26 DIAGNOSIS — I10 HYPERTENSION, UNCONTROLLED: Primary | ICD-10-CM

## 2023-07-26 DIAGNOSIS — E83.42 HYPOMAGNESEMIA: ICD-10-CM

## 2023-07-26 DIAGNOSIS — Z96.651 PRESENCE OF TOTAL KNEE JOINT PROSTHESIS, RIGHT: ICD-10-CM

## 2023-07-26 DIAGNOSIS — E66.01 SEVERE OBESITY (BMI 35.0-39.9) WITH COMORBIDITY (HCC): ICD-10-CM

## 2023-07-26 DIAGNOSIS — E78.2 HYPERLIPEMIA, MIXED: ICD-10-CM

## 2023-07-26 DIAGNOSIS — M54.42 CHRONIC BILATERAL LOW BACK PAIN WITH BILATERAL SCIATICA: ICD-10-CM

## 2023-07-26 DIAGNOSIS — M54.9 CHRONIC MID BACK PAIN: ICD-10-CM

## 2023-07-26 DIAGNOSIS — N52.9 ERECTILE DYSFUNCTION, UNSPECIFIED ERECTILE DYSFUNCTION TYPE: ICD-10-CM

## 2023-07-26 DIAGNOSIS — Z09 SURGERY FOLLOW-UP: Primary | ICD-10-CM

## 2023-07-26 DIAGNOSIS — F32.5 DEPRESSION, MAJOR, IN REMISSION (HCC): ICD-10-CM

## 2023-07-26 DIAGNOSIS — G89.29 CHRONIC MID BACK PAIN: ICD-10-CM

## 2023-07-26 DIAGNOSIS — I10 ESSENTIAL (PRIMARY) HYPERTENSION: ICD-10-CM

## 2023-07-26 DIAGNOSIS — R73.9 HIGH BLOOD SUGAR: ICD-10-CM

## 2023-07-26 PROCEDURE — 3078F DIAST BP <80 MM HG: CPT | Performed by: FAMILY MEDICINE

## 2023-07-26 PROCEDURE — 3075F SYST BP GE 130 - 139MM HG: CPT | Performed by: FAMILY MEDICINE

## 2023-07-26 PROCEDURE — 99214 OFFICE O/P EST MOD 30 MIN: CPT | Performed by: FAMILY MEDICINE

## 2023-07-26 RX ORDER — AMLODIPINE BESYLATE AND BENAZEPRIL HYDROCHLORIDE 10; 20 MG/1; MG/1
1 CAPSULE ORAL DAILY
Qty: 90 CAPSULE | Refills: 1 | Status: SHIPPED | OUTPATIENT
Start: 2023-07-26

## 2023-07-26 ASSESSMENT — ENCOUNTER SYMPTOMS
EYES NEGATIVE: 1
EYE PAIN: 0
BLURRED VISION: 0
ORTHOPNEA: 0
SHORTNESS OF BREATH: 0
APNEA: 0
COUGH: 0

## 2023-07-26 NOTE — PROGRESS NOTES
every morning. Indications: gastroesophageal reflux disease    pravastatin (PRAVACHOL) 40 MG tablet Take 1 tablet by mouth daily     No current facility-administered medications for this visit. Past Medical history:  Past Medical History:   Diagnosis Date    Anxiety     managed with meds    Arthritis of right acromioclavicular joint 09/29/2022    Borderline high cholesterol     controlled with medication    Bursitis 2020    Chronic pain     Depression, major, in remission ([address]) 8/25/2015    GERD (gastroesophageal reflux disease)     controlled with medication    Hypercholesterolemia     Hypertension     controlled with medication    Morbid obesity ([address])     BMI- 35.2  (9/20/18)- verbal    Osteoarthritis 2017    Sleep apnea     BMI- 35. C-pap nightly. has a past surgical history that includes pr unlisted procedure abdomen peritoneum & omentum (2012); Foot surgery (Left, [id number], again Oct 2020); shoulder surgery (Left, Sept 2018); and Total knee arthroplasty (Right, 4/20/2023). Family History:  [unfilled]     Social History:  [unfilled]    Review of Systems:       PE: The patient is ambulating with reciprocal heel toe gait with a bit of a flexion contracture in the use of a cane. He is able to ambulate without the cane. On examination while seated his incision looks good. He lacks about 7 degrees from full extension actively but with a little effort to get to close to 5 degrees from full extension. He flexes 115 degrees. There is no significant effusion and good stability is evident. RADIOGRAPHS:  No radiographs are obtained today. IMPRESSION AND PLAN:  The patient is doing well postop. I advised them to continue with their home exercise program, including strengthening and stretching exercises. They were advised on the use of Tylenol or NSAIDs if they have acute pain or swelling. I recommend he continue to work diligently to work on getting his extension.   I explained to

## 2023-10-18 ENCOUNTER — OFFICE VISIT (OUTPATIENT)
Dept: FAMILY MEDICINE CLINIC | Facility: CLINIC | Age: 53
End: 2023-10-18
Payer: OTHER GOVERNMENT

## 2023-10-18 DIAGNOSIS — M25.561 CHRONIC PAIN OF BOTH KNEES: ICD-10-CM

## 2023-10-18 DIAGNOSIS — G89.29 CHRONIC MID BACK PAIN: ICD-10-CM

## 2023-10-18 DIAGNOSIS — M25.511 CHRONIC RIGHT SHOULDER PAIN: ICD-10-CM

## 2023-10-18 DIAGNOSIS — G89.29 CHRONIC BILATERAL LOW BACK PAIN WITH BILATERAL SCIATICA: Primary | ICD-10-CM

## 2023-10-18 DIAGNOSIS — M54.41 CHRONIC BILATERAL LOW BACK PAIN WITH BILATERAL SCIATICA: Primary | ICD-10-CM

## 2023-10-18 DIAGNOSIS — M54.42 CHRONIC BILATERAL LOW BACK PAIN WITH BILATERAL SCIATICA: Primary | ICD-10-CM

## 2023-10-18 DIAGNOSIS — G89.29 CHRONIC RIGHT SHOULDER PAIN: ICD-10-CM

## 2023-10-18 DIAGNOSIS — M54.9 CHRONIC MID BACK PAIN: ICD-10-CM

## 2023-10-18 DIAGNOSIS — M25.562 CHRONIC PAIN OF BOTH KNEES: ICD-10-CM

## 2023-10-18 DIAGNOSIS — G89.29 CHRONIC PAIN OF BOTH KNEES: ICD-10-CM

## 2023-10-18 PROCEDURE — 99214 OFFICE O/P EST MOD 30 MIN: CPT | Performed by: FAMILY MEDICINE

## 2023-10-18 ASSESSMENT — ENCOUNTER SYMPTOMS
EYES NEGATIVE: 1
BACK PAIN: 1
RESPIRATORY NEGATIVE: 1

## 2023-10-23 ENCOUNTER — OFFICE VISIT (OUTPATIENT)
Dept: ORTHOPEDIC SURGERY | Age: 53
End: 2023-10-23

## 2023-10-23 DIAGNOSIS — M17.11 PRIMARY OSTEOARTHRITIS OF RIGHT KNEE: Primary | ICD-10-CM

## 2023-10-23 DIAGNOSIS — M54.50 LOW BACK PAIN AT MULTIPLE SITES: ICD-10-CM

## 2023-10-23 DIAGNOSIS — Z96.651 HISTORY OF TOTAL KNEE ARTHROPLASTY, RIGHT: ICD-10-CM

## 2023-10-23 DIAGNOSIS — M70.61 GREATER TROCHANTERIC BURSITIS OF RIGHT HIP: ICD-10-CM

## 2023-10-23 DIAGNOSIS — Z09 FOLLOW-UP EXAMINATION: ICD-10-CM

## 2023-10-23 RX ORDER — METHYLPREDNISOLONE ACETATE 40 MG/ML
40 INJECTION, SUSPENSION INTRA-ARTICULAR; INTRALESIONAL; INTRAMUSCULAR; SOFT TISSUE ONCE
Status: COMPLETED | OUTPATIENT
Start: 2023-10-23 | End: 2023-10-23

## 2023-10-23 RX ORDER — DICLOFENAC SODIUM 75 MG/1
75 TABLET, DELAYED RELEASE ORAL 2 TIMES DAILY
Qty: 60 TABLET | Refills: 0 | Status: SHIPPED | OUTPATIENT
Start: 2023-10-23

## 2023-10-23 RX ADMIN — METHYLPREDNISOLONE ACETATE 40 MG: 40 INJECTION, SUSPENSION INTRA-ARTICULAR; INTRALESIONAL; INTRAMUSCULAR; SOFT TISSUE at 10:06

## 2023-10-23 NOTE — PROGRESS NOTES
Name: Rochelle Lara  YOB: 1970  Gender: male  MRN: [id number]      Chief complaint:  6 months s/p right TKA    History of Present Illness:     Rochelle Lara is a 47 y.o. male  He is in today for recheck guarding his right TKA as he is 6 months postop now. He is still recovering little slowly. He still complains of stiffness after sitting for long periods of time as well swelling after being on his knee for long period of time. He does use a cane for ambulation at times still not only due to his right knee but also due to underlying chronic low back issues. He has not yet returned to work and has applied for disability but was apparently denied. When asked if his knee pain is better than when he had prior to surgery, he did adamantly state yes and stated that he \"could not walk\" on the right knee prior to surgery. He still does note some swelling at times in the right knee. He has been taking meloxicam which she states helped some but does not complete relief of his symptoms. Past Medical History: Allergies: Allergies   Allergen Reactions    Duloxetine Anaphylaxis and Nausea And Vomiting    Fluoxetine      Other reaction(s): Partial therapeutic response    Sertraline      Other reaction(s): Partial therapeutic response       Medications:  Current Outpatient Medications   Medication Sig    amLODIPine-benazepril (LOTREL) 10-20 MG per capsule Take 1 capsule by mouth daily    diclofenac (VOLTAREN) 75 MG EC tablet Take 1 tablet by mouth 2 times daily    ondansetron (ZOFRAN) 4 MG tablet Take 1 tablet by mouth every 6 hours as needed for Nausea or Vomiting    methocarbamol (ROBAXIN-750) 750 MG tablet Take 1 tablet by mouth every 6 hours as needed for spasms. aspirin EC 81 MG EC tablet Take 1 tablet by mouth in the morning and 1 tablet in the evening.     celecoxib (CELEBREX) 200 MG capsule Take 1 capsule by mouth daily    spironolactone (ALDACTONE) 25 MG tablet Take 1 tablet by

## 2023-11-28 ENCOUNTER — OFFICE VISIT (OUTPATIENT)
Dept: FAMILY MEDICINE CLINIC | Facility: CLINIC | Age: 53
End: 2023-11-28
Payer: OTHER GOVERNMENT

## 2023-11-28 VITALS
DIASTOLIC BLOOD PRESSURE: 80 MMHG | SYSTOLIC BLOOD PRESSURE: 132 MMHG | BODY MASS INDEX: 35.28 KG/M2 | HEIGHT: 71 IN | WEIGHT: 252 LBS

## 2023-11-28 DIAGNOSIS — E66.01 SEVERE OBESITY (BMI 35.0-39.9) WITH COMORBIDITY (HCC): ICD-10-CM

## 2023-11-28 DIAGNOSIS — G89.29 CHRONIC MID BACK PAIN: Primary | ICD-10-CM

## 2023-11-28 DIAGNOSIS — N52.9 ERECTILE DYSFUNCTION, UNSPECIFIED ERECTILE DYSFUNCTION TYPE: ICD-10-CM

## 2023-11-28 DIAGNOSIS — M54.41 CHRONIC BILATERAL LOW BACK PAIN WITH BILATERAL SCIATICA: ICD-10-CM

## 2023-11-28 DIAGNOSIS — M54.42 CHRONIC BILATERAL LOW BACK PAIN WITH BILATERAL SCIATICA: ICD-10-CM

## 2023-11-28 DIAGNOSIS — I10 HYPERTENSION, UNCONTROLLED: ICD-10-CM

## 2023-11-28 DIAGNOSIS — M54.9 CHRONIC MID BACK PAIN: Primary | ICD-10-CM

## 2023-11-28 DIAGNOSIS — I10 ESSENTIAL (PRIMARY) HYPERTENSION: ICD-10-CM

## 2023-11-28 DIAGNOSIS — G89.29 CHRONIC BILATERAL LOW BACK PAIN WITH BILATERAL SCIATICA: ICD-10-CM

## 2023-11-28 DIAGNOSIS — R73.9 HIGH BLOOD SUGAR: ICD-10-CM

## 2023-11-28 PROCEDURE — 99214 OFFICE O/P EST MOD 30 MIN: CPT | Performed by: FAMILY MEDICINE

## 2023-11-28 PROCEDURE — 3075F SYST BP GE 130 - 139MM HG: CPT | Performed by: FAMILY MEDICINE

## 2023-11-28 PROCEDURE — 3079F DIAST BP 80-89 MM HG: CPT | Performed by: FAMILY MEDICINE

## 2023-11-28 ASSESSMENT — PATIENT HEALTH QUESTIONNAIRE - PHQ9
SUM OF ALL RESPONSES TO PHQ QUESTIONS 1-9: 0
SUM OF ALL RESPONSES TO PHQ9 QUESTIONS 1 & 2: 0
1. LITTLE INTEREST OR PLEASURE IN DOING THINGS: 0
SUM OF ALL RESPONSES TO PHQ QUESTIONS 1-9: 0
SUM OF ALL RESPONSES TO PHQ QUESTIONS 1-9: 0
2. FEELING DOWN, DEPRESSED OR HOPELESS: 0
SUM OF ALL RESPONSES TO PHQ QUESTIONS 1-9: 0

## 2023-11-28 ASSESSMENT — ENCOUNTER SYMPTOMS
EYE DISCHARGE: 0
EYE PAIN: 0
BLOOD IN STOOL: 0
RHINORRHEA: 0
SINUS PRESSURE: 0
BACK PAIN: 1
ABDOMINAL DISTENTION: 0
FACIAL SWELLING: 0
ORTHOPNEA: 0
APNEA: 0
ABDOMINAL PAIN: 0
BLURRED VISION: 0
SINUS PAIN: 0
EYE REDNESS: 0
CHEST TIGHTNESS: 0
EYE ITCHING: 0
COUGH: 0
ANAL BLEEDING: 0

## 2024-04-29 ENCOUNTER — OFFICE VISIT (OUTPATIENT)
Dept: ORTHOPEDIC SURGERY | Age: 54
End: 2024-04-29
Payer: OTHER GOVERNMENT

## 2024-04-29 DIAGNOSIS — Z96.651 HISTORY OF TOTAL KNEE ARTHROPLASTY, RIGHT: ICD-10-CM

## 2024-04-29 DIAGNOSIS — M17.11 PRIMARY OSTEOARTHRITIS OF RIGHT KNEE: Primary | ICD-10-CM

## 2024-04-29 DIAGNOSIS — Z09 FOLLOW-UP EXAMINATION: ICD-10-CM

## 2024-04-29 PROCEDURE — 99213 OFFICE O/P EST LOW 20 MIN: CPT | Performed by: PHYSICIAN ASSISTANT

## 2024-04-29 NOTE — PROGRESS NOTES
Diagnosis Date    Anxiety     managed with meds    Arthritis of right acromioclavicular joint 09/29/2022    Borderline high cholesterol     controlled with medication    Bursitis 2020    Chronic pain     Depression, major, in remission ([facility]) 8/25/2015    GERD (gastroesophageal reflux disease)     controlled with medication    Hypercholesterolemia     Hypertension     controlled with medication    Morbid obesity ([facility]CC)     BMI- 35.2  (9/20/18)- verbal    Osteoarthritis 2017    Sleep apnea     BMI- 35. C-pap nightly.        has a past surgical history that includes pr unlisted procedure abdomen peritoneum & omentum (2012); Foot surgery (Left, August2019, again Oct 2020); shoulder surgery (Left, Sept 2018); and Total knee arthroplasty (Right, 4/20/2023).     Family History:  [unfilled]     Social History:  [unfilled]    Review of Systems:       PHYSICAL EXAM: The patient is alert, awake and cooperative to examination. Ambulates with reciprocal heel toe with good alignment in the lower extremity. There is no significant limp. ROM is currently 0 to 115. There is minimal effusion present and the incision has healed well.    RADIOGRAPHS: Standing AP, flexion lateral, and sunrise views of the RIGHT knee demonstrates the implant to be in good position with acceptable alignment with good bone cement interfaces.                                                                                                                                                                    RADIOGRAPHIC IMPRESSION: Stable RIGHT knee 1 year post op             CLINICAL IMPRESSION: 1 year s/p RIGHT TKA doing well. I made recommendations for continued activity and I would recommend further clinical and radiographic FU in the future if there are any problems.     Work/Activity Restrictions: NOT APPLICABLE    NELSON Estevez

## 2024-07-19 ENCOUNTER — TELEPHONE (OUTPATIENT)
Dept: FAMILY MEDICINE CLINIC | Facility: CLINIC | Age: 54
End: 2024-07-19

## 2024-07-19 DIAGNOSIS — K29.01 ACUTE SUPERFICIAL GASTRITIS WITH HEMORRHAGE: ICD-10-CM

## 2024-07-19 RX ORDER — LANSOPRAZOLE 30 MG/1
30 CAPSULE, DELAYED RELEASE ORAL DAILY
Qty: 30 CAPSULE | Refills: 0 | Status: SHIPPED | OUTPATIENT
Start: 2024-07-19 | End: 2024-08-18

## 2024-07-19 NOTE — TELEPHONE ENCOUNTER
Patient called requesting refill(s) on     Requested Prescriptions     Pending Prescriptions Disp Refills    lansoprazole (PREVACID) 30 MG delayed release capsule 30 capsule 0     Sig: Take 1 capsule by mouth daily Takes every morning. Indications: gastroesophageal reflux disease       Last appointment- 11/28/23  Next appointment- NEEDS APT     30 QTY SENT needs apt for next refill

## 2024-07-22 ENCOUNTER — OFFICE VISIT (OUTPATIENT)
Dept: FAMILY MEDICINE CLINIC | Facility: CLINIC | Age: 54
End: 2024-07-22
Payer: OTHER GOVERNMENT

## 2024-07-22 VITALS
SYSTOLIC BLOOD PRESSURE: 136 MMHG | BODY MASS INDEX: 34.58 KG/M2 | HEIGHT: 71 IN | DIASTOLIC BLOOD PRESSURE: 80 MMHG | WEIGHT: 247 LBS

## 2024-07-22 DIAGNOSIS — M54.9 CHRONIC MID BACK PAIN: Primary | ICD-10-CM

## 2024-07-22 DIAGNOSIS — F32.5 DEPRESSION, MAJOR, IN REMISSION (HCC): ICD-10-CM

## 2024-07-22 DIAGNOSIS — R73.9 HIGH BLOOD SUGAR: ICD-10-CM

## 2024-07-22 DIAGNOSIS — K29.01 ACUTE SUPERFICIAL GASTRITIS WITH HEMORRHAGE: ICD-10-CM

## 2024-07-22 DIAGNOSIS — G89.29 CHRONIC BILATERAL LOW BACK PAIN WITH BILATERAL SCIATICA: ICD-10-CM

## 2024-07-22 DIAGNOSIS — M54.41 CHRONIC BILATERAL LOW BACK PAIN WITH BILATERAL SCIATICA: ICD-10-CM

## 2024-07-22 DIAGNOSIS — I10 HYPERTENSION, UNCONTROLLED: ICD-10-CM

## 2024-07-22 DIAGNOSIS — N52.9 ERECTILE DYSFUNCTION, UNSPECIFIED ERECTILE DYSFUNCTION TYPE: ICD-10-CM

## 2024-07-22 DIAGNOSIS — M54.42 CHRONIC BILATERAL LOW BACK PAIN WITH BILATERAL SCIATICA: ICD-10-CM

## 2024-07-22 DIAGNOSIS — G89.29 CHRONIC MID BACK PAIN: Primary | ICD-10-CM

## 2024-07-22 PROCEDURE — 3075F SYST BP GE 130 - 139MM HG: CPT | Performed by: FAMILY MEDICINE

## 2024-07-22 PROCEDURE — 99214 OFFICE O/P EST MOD 30 MIN: CPT | Performed by: FAMILY MEDICINE

## 2024-07-22 PROCEDURE — 3079F DIAST BP 80-89 MM HG: CPT | Performed by: FAMILY MEDICINE

## 2024-07-22 RX ORDER — LANSOPRAZOLE 30 MG/1
30 CAPSULE, DELAYED RELEASE ORAL DAILY
Qty: 90 CAPSULE | Refills: 1 | Status: SHIPPED | OUTPATIENT
Start: 2024-07-22 | End: 2025-01-18

## 2024-07-22 ASSESSMENT — ENCOUNTER SYMPTOMS
BACK PAIN: 0
EYE PAIN: 0
ABDOMINAL PAIN: 0
APNEA: 0
ANAL BLEEDING: 0
EYE DISCHARGE: 0
SINUS PAIN: 0
RHINORRHEA: 0
EYE REDNESS: 0
SINUS PRESSURE: 0
COUGH: 0
EYE ITCHING: 0
FACIAL SWELLING: 0
BLURRED VISION: 0
BLOOD IN STOOL: 0
ABDOMINAL DISTENTION: 0
CHEST TIGHTNESS: 0

## 2024-07-22 NOTE — PROGRESS NOTES
of motion.      Cervical back: Normal range of motion and neck supple.   Skin:     General: Skin is warm and dry.      Findings: No erythema or rash.   Neurological:      General: No focal deficit present.      Mental Status: He is alert and oriented to person, place, and time. Mental status is at baseline.   Psychiatric:         Mood and Affect: Mood normal.     Assessment/Plan:     ICD-10-CM    1. Chronic mid back pain  M54.9     G89.29       2. Acute superficial gastritis with hemorrhage  K29.01 lansoprazole (PREVACID) 30 MG delayed release capsule      3. Chronic bilateral low back pain with bilateral sciatica  M54.42     M54.41     G89.29       4. Hypertension, uncontrolled  I10       5. Erectile dysfunction, unspecified erectile dysfunction type  N52.9       6. High blood sugar  R73.9       7. Depression, major, in remission (HCC)  F32.5           1. Chronic mid back pain  2. Acute superficial gastritis with hemorrhage  -     lansoprazole (PREVACID) 30 MG delayed release capsule; Take 1 capsule by mouth daily Takes every morning. Indications: gastroesophageal reflux disease, Disp-90 capsule, R-1Normal  3. Chronic bilateral low back pain with bilateral sciatica  4. Hypertension, uncontrolled  5. Erectile dysfunction, unspecified erectile dysfunction type  6. High blood sugar  7. Depression, major, in remission (HCC)     Labs from outside source reviewed with pt today and medicines sent and pending as appropriate  Encourage low salt diet with exercise as tolerated  Encourage weight control efforts to reduce overall health risks in future  Encourage monitor BP at home   Recheck in 6 months or as needed for other problems.  Socrates Cruz MD

## 2024-08-21 ENCOUNTER — OFFICE VISIT (OUTPATIENT)
Dept: FAMILY MEDICINE CLINIC | Facility: CLINIC | Age: 54
End: 2024-08-21
Payer: OTHER GOVERNMENT

## 2024-08-21 VITALS
WEIGHT: 230 LBS | SYSTOLIC BLOOD PRESSURE: 138 MMHG | BODY MASS INDEX: 32.2 KG/M2 | HEIGHT: 71 IN | DIASTOLIC BLOOD PRESSURE: 78 MMHG

## 2024-08-21 DIAGNOSIS — R73.9 HIGH BLOOD SUGAR: ICD-10-CM

## 2024-08-21 DIAGNOSIS — R30.0 DYSURIA: Primary | ICD-10-CM

## 2024-08-21 DIAGNOSIS — I10 HYPERTENSION, UNCONTROLLED: ICD-10-CM

## 2024-08-21 DIAGNOSIS — R10.32 LEFT INGUINAL PAIN: ICD-10-CM

## 2024-08-21 DIAGNOSIS — E66.01 SEVERE OBESITY (BMI 35.0-39.9) WITH COMORBIDITY (HCC): ICD-10-CM

## 2024-08-21 LAB
BILIRUBIN, URINE, POC: NEGATIVE
BLOOD URINE, POC: ABNORMAL
GLUCOSE URINE, POC: NEGATIVE
KETONES, URINE, POC: NEGATIVE
LEUKOCYTE ESTERASE, URINE, POC: NEGATIVE
NITRITE, URINE, POC: NEGATIVE
PH, URINE, POC: 6 (ref 4.6–8)
PROTEIN,URINE, POC: ABNORMAL
SPECIFIC GRAVITY, URINE, POC: 1.02 (ref 1–1.03)
URINALYSIS CLARITY, POC: CLEAR
URINALYSIS COLOR, POC: ABNORMAL
UROBILINOGEN, POC: ABNORMAL

## 2024-08-21 PROCEDURE — 3075F SYST BP GE 130 - 139MM HG: CPT | Performed by: FAMILY MEDICINE

## 2024-08-21 PROCEDURE — 99214 OFFICE O/P EST MOD 30 MIN: CPT | Performed by: FAMILY MEDICINE

## 2024-08-21 PROCEDURE — 81003 URINALYSIS AUTO W/O SCOPE: CPT | Performed by: FAMILY MEDICINE

## 2024-08-21 PROCEDURE — 3078F DIAST BP <80 MM HG: CPT | Performed by: FAMILY MEDICINE

## 2024-08-21 ASSESSMENT — PATIENT HEALTH QUESTIONNAIRE - PHQ9
1. LITTLE INTEREST OR PLEASURE IN DOING THINGS: NOT AT ALL
SUM OF ALL RESPONSES TO PHQ QUESTIONS 1-9: 0
SUM OF ALL RESPONSES TO PHQ QUESTIONS 1-9: 0
SUM OF ALL RESPONSES TO PHQ9 QUESTIONS 1 & 2: 0
SUM OF ALL RESPONSES TO PHQ QUESTIONS 1-9: 0
2. FEELING DOWN, DEPRESSED OR HOPELESS: NOT AT ALL
SUM OF ALL RESPONSES TO PHQ QUESTIONS 1-9: 0

## 2024-08-21 ASSESSMENT — ENCOUNTER SYMPTOMS
EYE REDNESS: 0
APNEA: 0
SINUS PRESSURE: 0
FACIAL SWELLING: 0
BLURRED VISION: 0
ABDOMINAL DISTENTION: 0
BLOOD IN STOOL: 0
EYE DISCHARGE: 0
BACK PAIN: 0
ANAL BLEEDING: 0
RHINORRHEA: 0
CHEST TIGHTNESS: 0
SINUS PAIN: 0
COUGH: 0
ABDOMINAL PAIN: 1
EYE PAIN: 0
EYE ITCHING: 0

## 2024-08-21 NOTE — PROGRESS NOTES
use: Not Currently     Alcohol/week: 6.0 standard drinks of alcohol     Types: 6 Cans of beer per week       Allergies   Allergen Reactions   • Duloxetine Anaphylaxis and Nausea And Vomiting   • Fluoxetine      Other reaction(s): Partial therapeutic response   • Sertraline      Other reaction(s): Partial therapeutic response       Current Outpatient Medications   Medication Sig Dispense Refill   • lansoprazole (PREVACID) 30 MG delayed release capsule Take 1 capsule by mouth daily Takes every morning. Indications: gastroesophageal reflux disease 90 capsule 1   • amLODIPine-benazepril (LOTREL) 10-20 MG per capsule Take 1 capsule by mouth daily 90 capsule 1   • diclofenac (VOLTAREN) 75 MG EC tablet Take 1 tablet by mouth 2 times daily 60 tablet 0   • methocarbamol (ROBAXIN-750) 750 MG tablet Take 1 tablet by mouth every 6 hours as needed for spasms. 40 tablet 0   • busPIRone (BUSPAR) 15 MG tablet Take 15 mg by mouth in the morning and at bedtime     • chlorthalidone (HYGROTON) 25 MG tablet Take 1 tablet by mouth daily 90 tablet 1   • pravastatin (PRAVACHOL) 40 MG tablet Take 1 tablet by mouth daily 90 tablet 1     No current facility-administered medications for this visit.       Vitals:    /78   Ht 1.803 m (5' 11\")   Wt 104.3 kg (230 lb)   BMI 32.08 kg/m²     Physical Exam:  Physical Exam  Vitals and nursing note reviewed.   Constitutional:       Appearance: Normal appearance. He is obese. He is not ill-appearing or diaphoretic.   HENT:      Head: Normocephalic and atraumatic.      Nose: Nose normal.   Eyes:      Pupils: Pupils are equal, round, and reactive to light.   Cardiovascular:      Rate and Rhythm: Normal rate and regular rhythm.      Pulses: Normal pulses.      Heart sounds: Normal heart sounds.   Pulmonary:      Effort: Pulmonary effort is normal.      Breath sounds: Normal breath sounds.   Musculoskeletal:         General: No swelling or tenderness.      Cervical back: Normal range of motion and

## 2024-08-22 ENCOUNTER — TELEPHONE (OUTPATIENT)
Dept: FAMILY MEDICINE CLINIC | Facility: CLINIC | Age: 54
End: 2024-08-22

## 2024-08-22 DIAGNOSIS — R30.0 DYSURIA: Primary | ICD-10-CM

## 2024-08-22 PROBLEM — F43.12 CHRONIC POST-TRAUMATIC STRESS DISORDER (PTSD): Status: ACTIVE | Noted: 2024-08-22

## 2024-08-22 PROBLEM — G47.33 OBSTRUCTIVE SLEEP APNEA OF ADULT: Status: ACTIVE | Noted: 2024-08-22

## 2024-08-22 RX ORDER — CIPROFLOXACIN 500 MG/1
500 TABLET, FILM COATED ORAL 2 TIMES DAILY
Qty: 10 TABLET | Refills: 0 | Status: SHIPPED | OUTPATIENT
Start: 2024-08-22 | End: 2024-08-27

## 2024-09-16 NOTE — DISCHARGE INSTRUCTIONS
Post-Operative Instructions   For   [facility]  Shoulder Arthroscopy  Phone:  [phone]    1. Apply ice to the shoulder as needed. 2.   Keep dressings in place for the first two days. After two days you  may remove the dressings. Keep the stab wounds clean. The small stitches will be removed when you come to the office. You can cover them with small band aids. 3. You may discontinue use of your sling and begin active range of motion of the shoulder as tolerated by pain, unless you are instructed otherwise. 4. You may shower after the dressing comes off. Cover the small wounds with  waterproof band aids. 5. Use  pain medication as instructed on the bottle. If you have pain medication from another physcian do not use it with this medication. 6. You may have some side effects from your pain medication. If you have nausea, try taking your medication with food. For itching, you may take over the counter Benadryl. 7. If you have a problem, please call [address] [place] [address] at [phone]    Gisell Loving M.D. [facility] ACTIVITY  · As tolerated and as directed by your doctor. · Bathe or shower as directed by your doctor. DIET  · Clear liquids until no nausea or vomiting; then light diet for the first day. · Advance to regular diet on second day, unless your doctor orders otherwise. · If nausea and vomiting continues, call your doctor. PAIN  · Take pain medication as directed by your doctor. · Call your doctor if pain is NOT relieved by medication. · DO NOT take aspirin of blood thinners unless directed by your doctor.      DRESSING CARE       CALL YOUR DOCTOR IF   · Excessive bleeding that does not stop after holding pressure over the area  · Temperature of 101 degrees F or above  · Excessive redness, swelling or bruising, and/ or green or yellow, smelly discharge from incision    AFTER ANESTHESIA   · For the first 24 hours: DO NOT Drive, Drink alcoholic beverages, or Make important decisions. · Be aware of dizziness following anesthesia and while taking pain medication. APPOINTMENT DATE/ TIME    YOUR DOCTOR'S PHONE NUMBER       DISCHARGE SUMMARY from Nurse    PATIENT INSTRUCTIONS:    After general anesthesia or intravenous sedation, for 24 hours or while taking prescription Narcotics:  · Limit your activities  · Do not drive and operate hazardous machinery  · Do not make important personal or business decisions  · Do  not drink alcoholic beverages  · If you have not urinated within 8 hours after discharge, please contact your surgeon on call. *  Please give a list of your current medications to your Primary Care Provider. *  Please update this list whenever your medications are discontinued, doses are      changed, or new medications (including over-the-counter products) are added. *  Please carry medication information at all times in case of emergency situations. These are general instructions for a healthy lifestyle:    No smoking/ No tobacco products/ Avoid exposure to second hand smoke    Surgeon General's Warning:  Quitting smoking now greatly reduces serious risk to your health. Obesity, smoking, and sedentary lifestyle greatly increases your risk for illness    A healthy diet, regular physical exercise & weight monitoring are important for maintaining a healthy lifestyle    You may be retaining fluid if you have a history of heart failure or if you experience any of the following symptoms:  Weight gain of 3 pounds or more overnight or 5 pounds in a week, increased swelling in our hands or feet or shortness of breath while lying flat in bed. Please call your doctor as soon as you notice any of these symptoms; do not wait until your next office visit.     Recognize signs and symptoms of STROKE:    F-face looks uneven    A-arms unable to move or move unevenly    S-speech slurred or non-existent    T-time-call 911 as soon as signs and symptoms begin-DO NOT go       Back to bed or wait to see if you get better-TIME IS BRAIN. No

## 2024-11-25 ENCOUNTER — OFFICE VISIT (OUTPATIENT)
Dept: FAMILY MEDICINE CLINIC | Facility: CLINIC | Age: 54
End: 2024-11-25

## 2024-11-25 VITALS
BODY MASS INDEX: 35.28 KG/M2 | DIASTOLIC BLOOD PRESSURE: 74 MMHG | WEIGHT: 252 LBS | SYSTOLIC BLOOD PRESSURE: 136 MMHG | HEIGHT: 71 IN

## 2024-11-25 DIAGNOSIS — I10 HYPERTENSION, UNCONTROLLED: Primary | ICD-10-CM

## 2024-11-25 DIAGNOSIS — H81.10 BENIGN PAROXYSMAL POSITIONAL VERTIGO, UNSPECIFIED LATERALITY: ICD-10-CM

## 2024-11-25 DIAGNOSIS — K29.01 ACUTE SUPERFICIAL GASTRITIS WITH HEMORRHAGE: ICD-10-CM

## 2024-11-25 PROBLEM — M48.061 SPINAL STENOSIS OF LUMBAR REGION: Status: ACTIVE | Noted: 2024-05-22

## 2024-11-25 PROBLEM — M25.551 PAIN OF BOTH HIP JOINTS: Status: ACTIVE | Noted: 2022-05-02

## 2024-11-25 PROBLEM — M47.817 LUMBOSACRAL SPONDYLOSIS WITHOUT MYELOPATHY: Status: ACTIVE | Noted: 2024-05-22

## 2024-11-25 PROBLEM — M54.16 LUMBAR RADICULOPATHY: Status: ACTIVE | Noted: 2022-05-02

## 2024-11-25 PROBLEM — M25.552 PAIN OF BOTH HIP JOINTS: Status: ACTIVE | Noted: 2022-05-02

## 2024-11-25 PROCEDURE — 99214 OFFICE O/P EST MOD 30 MIN: CPT | Performed by: FAMILY MEDICINE

## 2024-11-25 PROCEDURE — 3078F DIAST BP <80 MM HG: CPT | Performed by: FAMILY MEDICINE

## 2024-11-25 PROCEDURE — 3075F SYST BP GE 130 - 139MM HG: CPT | Performed by: FAMILY MEDICINE

## 2024-11-25 RX ORDER — LANSOPRAZOLE 30 MG/1
30 CAPSULE, DELAYED RELEASE ORAL DAILY
Qty: 90 CAPSULE | Refills: 1 | Status: SHIPPED | OUTPATIENT
Start: 2024-11-25 | End: 2025-05-24

## 2024-11-25 RX ORDER — MECLIZINE HYDROCHLORIDE 25 MG/1
25 TABLET ORAL 3 TIMES DAILY PRN
Qty: 30 TABLET | Refills: 2 | Status: SHIPPED | OUTPATIENT
Start: 2024-11-25 | End: 2024-12-25

## 2024-11-25 ASSESSMENT — ENCOUNTER SYMPTOMS
SINUS PRESSURE: 0
SINUS PAIN: 0
BLURRED VISION: 0
CHEST TIGHTNESS: 0
RHINORRHEA: 0
ABDOMINAL PAIN: 0
EYE DISCHARGE: 0
APNEA: 0
FACIAL SWELLING: 0
ORTHOPNEA: 0
EYE PAIN: 0
BLOOD IN STOOL: 0
COUGH: 0
ABDOMINAL DISTENTION: 0
EYE ITCHING: 0
SHORTNESS OF BREATH: 0
EYE REDNESS: 0
BACK PAIN: 0
ANAL BLEEDING: 0

## 2024-11-25 NOTE — PROGRESS NOTES
40 MG tablet Take 1 tablet by mouth daily 90 tablet 1     No current facility-administered medications for this visit.       Vitals:    /74   Ht 1.803 m (5' 10.98\")   Wt 114.3 kg (252 lb)   BMI 35.16 kg/m²     Physical Exam:  Physical Exam  Vitals and nursing note reviewed.   Constitutional:       Appearance: Normal appearance. He is obese. He is not ill-appearing or diaphoretic.   HENT:      Head: Normocephalic and atraumatic.      Nose: Nose normal.   Eyes:      Pupils: Pupils are equal, round, and reactive to light.   Cardiovascular:      Rate and Rhythm: Normal rate and regular rhythm.      Pulses: Normal pulses.      Heart sounds: Normal heart sounds.   Pulmonary:      Effort: Pulmonary effort is normal.      Breath sounds: Normal breath sounds.   Musculoskeletal:         General: Tenderness present. No swelling.      Cervical back: Normal range of motion and neck supple.      Comments: Chronic pain in back, multiple levels, walks with cane, unchanged from baseline     Skin:     General: Skin is warm and dry.      Findings: No erythema or rash.   Neurological:      General: No focal deficit present.      Mental Status: He is alert and oriented to person, place, and time. Mental status is at baseline.   Psychiatric:         Mood and Affect: Mood normal.     Assessment/Plan:     ICD-10-CM    1. Hypertension, uncontrolled  I10       2. Acute superficial gastritis with hemorrhage  K29.01 lansoprazole (PREVACID) 30 MG delayed release capsule      3. Benign paroxysmal positional vertigo, unspecified laterality  H81.10 meclizine (ANTIVERT) 25 MG tablet          1. Hypertension, uncontrolled  2. Acute superficial gastritis with hemorrhage  -     lansoprazole (PREVACID) 30 MG delayed release capsule; Take 1 capsule by mouth daily Takes every morning. Indications: gastroesophageal reflux disease, Disp-90 capsule, R-1Normal  3. Benign paroxysmal positional vertigo, unspecified laterality  -     meclizine

## 2025-01-23 ENCOUNTER — HOSPITAL ENCOUNTER (OUTPATIENT)
Dept: MRI IMAGING | Age: 55
Discharge: HOME | End: 2025-01-26
Payer: OTHER GOVERNMENT

## 2025-01-23 DIAGNOSIS — M17.0 PRIMARY OSTEOARTHRITIS OF BOTH KNEES: ICD-10-CM

## 2025-01-23 PROCEDURE — 73721 MRI JNT OF LWR EXTRE W/O DYE: CPT

## 2025-02-11 ENCOUNTER — TELEMEDICINE (OUTPATIENT)
Dept: FAMILY MEDICINE CLINIC | Facility: CLINIC | Age: 55
End: 2025-02-11
Payer: OTHER GOVERNMENT

## 2025-02-11 DIAGNOSIS — R73.9 HIGH BLOOD SUGAR: ICD-10-CM

## 2025-02-11 DIAGNOSIS — U07.1 COVID-19: Primary | ICD-10-CM

## 2025-02-11 DIAGNOSIS — H81.10 BENIGN PAROXYSMAL POSITIONAL VERTIGO, UNSPECIFIED LATERALITY: ICD-10-CM

## 2025-02-11 DIAGNOSIS — F32.5 DEPRESSION, MAJOR, IN REMISSION (HCC): ICD-10-CM

## 2025-02-11 DIAGNOSIS — I10 HYPERTENSION, UNCONTROLLED: ICD-10-CM

## 2025-02-11 DIAGNOSIS — E66.01 SEVERE OBESITY (BMI 35.0-39.9) WITH COMORBIDITY: ICD-10-CM

## 2025-02-11 PROBLEM — R42 VERTIGO: Status: ACTIVE | Noted: 2025-02-11

## 2025-02-11 PROBLEM — M17.0 BILATERAL PRIMARY OSTEOARTHRITIS OF KNEE: Status: ACTIVE | Noted: 2025-02-11

## 2025-02-11 PROCEDURE — 99214 OFFICE O/P EST MOD 30 MIN: CPT | Performed by: FAMILY MEDICINE

## 2025-02-11 RX ORDER — DEXAMETHASONE 4 MG/1
4 TABLET ORAL 2 TIMES DAILY WITH MEALS
Qty: 10 TABLET | Refills: 0 | Status: SHIPPED | OUTPATIENT
Start: 2025-02-11 | End: 2025-02-16

## 2025-02-11 NOTE — PROGRESS NOTES
[facility]  _______________________________________  Socrates Gross MD                 [address] [place] [address]        Abdias Elkins [place]                  [place] [address]                                                                                    Phone: [phone]                                                                                    Fax: [phone]    Subjective:  Abdias Hopson is a 54 y.o.year old male presenting with complaints of cough, drainage, low grade fever, mild occasional wheezes with intermittent headache and sore throat with drainage and cough.   HTN: stable on meds at home, no recent flare noted  GERD: Stable on meds, no breakhtrough sx noted  Depression: stable on meds, no recent flare, no SI, ,no HI  Prior vertigo: not active at present  Obesity: continues to work on this with lifestyle changes    Allergies:  Allergies   Allergen Reactions    Duloxetine Anaphylaxis and Nausea And Vomiting    Fluoxetine      Other reaction(s): Partial therapeutic response    Sertraline      Other reaction(s): Partial therapeutic response    Paroxetine      Other Reaction(s): Partial therapeutic response       Medications:  Current Outpatient Medications   Medication Sig Dispense Refill    dexAMETHasone (DECADRON) 4 MG tablet Take 1 tablet by mouth 2 times daily (with meals) for 5 days 10 tablet 0    lansoprazole (PREVACID) 30 MG delayed release capsule Take 1 capsule by mouth daily Takes every morning. Indications: gastroesophageal reflux disease 90 capsule 1    amLODIPine-benazepril (LOTREL) 10-20 MG per capsule Take 1 capsule by mouth daily 90 capsule 1    diclofenac (VOLTAREN) 75 MG EC tablet Take 1 tablet by mouth 2 times daily 60 tablet 0    methocarbamol (ROBAXIN-750) 750 MG tablet Take 1 tablet by mouth every 6 hours as needed for spasms. 40 tablet 0    busPIRone (BUSPAR) 15 MG tablet Take 15 mg by mouth in the morning and at bedtime      chlorthalidone

## 2025-03-25 ENCOUNTER — OFFICE VISIT (OUTPATIENT)
Dept: FAMILY MEDICINE CLINIC | Facility: CLINIC | Age: 55
End: 2025-03-25
Payer: OTHER GOVERNMENT

## 2025-03-25 VITALS
HEIGHT: 71 IN | BODY MASS INDEX: 35.84 KG/M2 | WEIGHT: 256 LBS | SYSTOLIC BLOOD PRESSURE: 130 MMHG | DIASTOLIC BLOOD PRESSURE: 84 MMHG

## 2025-03-25 DIAGNOSIS — G89.29 CHRONIC BILATERAL LOW BACK PAIN WITH BILATERAL SCIATICA: ICD-10-CM

## 2025-03-25 DIAGNOSIS — H81.10 BENIGN PAROXYSMAL POSITIONAL VERTIGO, UNSPECIFIED LATERALITY: ICD-10-CM

## 2025-03-25 DIAGNOSIS — M54.41 CHRONIC BILATERAL LOW BACK PAIN WITH BILATERAL SCIATICA: ICD-10-CM

## 2025-03-25 DIAGNOSIS — M54.30 SCIATIC NERVE PAIN, UNSPECIFIED LATERALITY: Primary | ICD-10-CM

## 2025-03-25 DIAGNOSIS — M54.42 CHRONIC BILATERAL LOW BACK PAIN WITH BILATERAL SCIATICA: ICD-10-CM

## 2025-03-25 DIAGNOSIS — E66.01 SEVERE OBESITY (BMI 35.0-39.9) WITH COMORBIDITY: ICD-10-CM

## 2025-03-25 DIAGNOSIS — M54.9 CHRONIC MID BACK PAIN: ICD-10-CM

## 2025-03-25 DIAGNOSIS — R73.9 HIGH BLOOD SUGAR: ICD-10-CM

## 2025-03-25 DIAGNOSIS — I10 HYPERTENSION, UNCONTROLLED: ICD-10-CM

## 2025-03-25 DIAGNOSIS — G89.29 CHRONIC MID BACK PAIN: ICD-10-CM

## 2025-03-25 DIAGNOSIS — F32.5 DEPRESSION, MAJOR, IN REMISSION: ICD-10-CM

## 2025-03-25 DIAGNOSIS — I10 ESSENTIAL (PRIMARY) HYPERTENSION: ICD-10-CM

## 2025-03-25 PROCEDURE — 99214 OFFICE O/P EST MOD 30 MIN: CPT | Performed by: FAMILY MEDICINE

## 2025-03-25 PROCEDURE — 3079F DIAST BP 80-89 MM HG: CPT | Performed by: FAMILY MEDICINE

## 2025-03-25 PROCEDURE — 3075F SYST BP GE 130 - 139MM HG: CPT | Performed by: FAMILY MEDICINE

## 2025-03-25 RX ORDER — PREDNISONE 20 MG/1
20 TABLET ORAL 2 TIMES DAILY
Qty: 14 TABLET | Refills: 0 | Status: SHIPPED | OUTPATIENT
Start: 2025-03-25 | End: 2025-04-01

## 2025-03-25 SDOH — ECONOMIC STABILITY: FOOD INSECURITY: WITHIN THE PAST 12 MONTHS, THE FOOD YOU BOUGHT JUST DIDN'T LAST AND YOU DIDN'T HAVE MONEY TO GET MORE.: PATIENT DECLINED

## 2025-03-25 SDOH — ECONOMIC STABILITY: FOOD INSECURITY: WITHIN THE PAST 12 MONTHS, YOU WORRIED THAT YOUR FOOD WOULD RUN OUT BEFORE YOU GOT MONEY TO BUY MORE.: PATIENT DECLINED

## 2025-03-25 ASSESSMENT — PATIENT HEALTH QUESTIONNAIRE - PHQ9
SUM OF ALL RESPONSES TO PHQ QUESTIONS 1-9: 0
1. LITTLE INTEREST OR PLEASURE IN DOING THINGS: NOT AT ALL
2. FEELING DOWN, DEPRESSED OR HOPELESS: NOT AT ALL

## 2025-03-25 ASSESSMENT — ENCOUNTER SYMPTOMS
ABDOMINAL PAIN: 0
BLURRED VISION: 0
ABDOMINAL DISTENTION: 0
FACIAL SWELLING: 0
BLOOD IN STOOL: 0
ORTHOPNEA: 0
EYE DISCHARGE: 0
BACK PAIN: 1
EYE PAIN: 0
EYE REDNESS: 0
APNEA: 0
SINUS PAIN: 0
RHINORRHEA: 0
ANAL BLEEDING: 0
EYE ITCHING: 0
SINUS PRESSURE: 0
CHEST TIGHTNESS: 0
COUGH: 0

## 2025-03-25 NOTE — PROGRESS NOTES
and hip pain with sciatic pain in past few weeks after working in the yard, prednisone taper usually helps, he has tried celebrex, topicals, ice, heat, muscle relaxer as well as ibuprofen with no help.  Encourage low salt diet with exercise as tolerated  Encourage weight control efforts to reduce overall health risks in future  Encourage monitor BP at home   Recheck in 6 months or as needed for other problems.   Socrates Cruz MD

## 2025-07-30 ENCOUNTER — OFFICE VISIT (OUTPATIENT)
Dept: FAMILY MEDICINE CLINIC | Facility: CLINIC | Age: 55
End: 2025-07-30

## 2025-07-30 VITALS — HEIGHT: 71 IN | WEIGHT: 254 LBS | BODY MASS INDEX: 35.56 KG/M2

## 2025-07-30 DIAGNOSIS — H81.10 BENIGN PAROXYSMAL POSITIONAL VERTIGO, UNSPECIFIED LATERALITY: ICD-10-CM

## 2025-07-30 DIAGNOSIS — F32.5 DEPRESSION, MAJOR, IN REMISSION: ICD-10-CM

## 2025-07-30 DIAGNOSIS — I10 HYPERTENSION, UNCONTROLLED: Primary | ICD-10-CM

## 2025-07-30 DIAGNOSIS — K29.01 ACUTE SUPERFICIAL GASTRITIS WITH HEMORRHAGE: ICD-10-CM

## 2025-07-30 DIAGNOSIS — E66.01 SEVERE OBESITY (BMI 35.0-39.9) WITH COMORBIDITY (HCC): ICD-10-CM

## 2025-07-30 PROCEDURE — 99214 OFFICE O/P EST MOD 30 MIN: CPT | Performed by: FAMILY MEDICINE

## 2025-07-30 RX ORDER — LANSOPRAZOLE 30 MG/1
30 CAPSULE, DELAYED RELEASE ORAL DAILY
Qty: 90 CAPSULE | Refills: 1 | Status: SHIPPED | OUTPATIENT
Start: 2025-07-30 | End: 2026-01-26

## 2025-07-30 ASSESSMENT — ENCOUNTER SYMPTOMS
CHEST TIGHTNESS: 0
BACK PAIN: 0
COUGH: 0
EYE PAIN: 0
ORTHOPNEA: 0
SINUS PRESSURE: 0
EYE ITCHING: 0
SINUS PAIN: 0
EYE REDNESS: 0
FACIAL SWELLING: 0
BLURRED VISION: 0
ABDOMINAL PAIN: 0
ANAL BLEEDING: 0
APNEA: 0
BLOOD IN STOOL: 0
ABDOMINAL DISTENTION: 0
RHINORRHEA: 0
EYE DISCHARGE: 0

## 2025-07-30 ASSESSMENT — PATIENT HEALTH QUESTIONNAIRE - PHQ9
SUM OF ALL RESPONSES TO PHQ QUESTIONS 1-9: 0
1. LITTLE INTEREST OR PLEASURE IN DOING THINGS: NOT AT ALL
SUM OF ALL RESPONSES TO PHQ QUESTIONS 1-9: 0
2. FEELING DOWN, DEPRESSED OR HOPELESS: NOT AT ALL

## 2025-07-30 NOTE — PROGRESS NOTES
[facility]  _______________________________________  Socrates Gross MD                 [address] [place] [address]        Abdias [place]kins [place] [address]                                                                                    Phone: [phone]                                                                                    Fax: [phone]    Abdias Hopson is a 55 y.o. male who is seen for evaluation of   Chief Complaint   Patient presents with   • Gastroesophageal Reflux   • Sleep Apnea   • Hypertension   • Cholesterol Problem       HPI:   Gastroesophageal Reflux  He reports no abdominal pain, no chest pain or no coughing. Chronicity: on meds, no danyelle eeffect or breakthrough noted, Pertinent negatives include no fatigue.   Hypertension  This is a chronic problem. The current episode started more than 1 year ago. The problem is unchanged. The problem is controlled. Pertinent negatives include no anxiety, blurred vision, chest pain, headaches, malaise/fatigue, neck pain or orthopnea. (on meds, need refills and labs, no side effect noted.   )   Hyperlipidemia  Episode onset: on meds , no side effect noted, need refills. Pertinent negatives include no chest pain or myalgias.   Depression  Visit Type: follow-up  Patient is not experiencing: confusion and nervousness/anxiety.  Episode frequency: reports good control on meds, no side effect noted.       Chief Complaint   Patient presents with   • Gastroesophageal Reflux   • Sleep Apnea   • Hypertension   • Cholesterol Problem         Review of Systems:    Review of Systems   Constitutional:  Negative for activity change, appetite change, chills, diaphoresis, fatigue, fever and malaise/fatigue.   HENT:  Negative for congestion, ear discharge, ear pain, facial swelling, hearing loss, mouth sores, rhinorrhea, sinus pressure and sinus pain.    Eyes:  Negative for blurred vision, pain, discharge, redness and

## (undated) DEVICE — TOTAL KNEE DR RIDGEWAY: Brand: MEDLINE INDUSTRIES, INC.

## (undated) DEVICE — SOLUTION IRRIG 1000ML 0.9% SOD CHL USP POUR PLAS BTL

## (undated) DEVICE — 90-S ACCELERATOR, SUCTION PROBE, NON-BENDABLE, MAX CUT LEVEL 11: Brand: SERFAS ENERGY

## (undated) DEVICE — STERILE SLEEVE: Brand: CONVERTORS

## (undated) DEVICE — STERILE PVP: Brand: MEDLINE INDUSTRIES, INC.

## (undated) DEVICE — PIN BNE FIX TEMP L110MM DIA4MM MAKO

## (undated) DEVICE — DRAPE EQUIP ROBOT STRL VELYS 20/PK ORDER IN MULTIIPLES OF 20 EACH

## (undated) DEVICE — DRAPE,SHOULDER,BEACH CHAIR,STERILE: Brand: MEDLINE

## (undated) DEVICE — Device

## (undated) DEVICE — 3M™ COBAN™ SELF-ADHERENT WRAP, 1586S, STERILE, 6 IN X 5 YD (15 CM X 4,5 M), 12 ROLLS/CASE: Brand: 3M™ COBAN™

## (undated) DEVICE — STOCKINETTE ORTH W9XL36IN COT 2 PLY HLLW FOR HANDLING LMB

## (undated) DEVICE — NDL SPNE QNCKE 18GX3.5IN LF --

## (undated) DEVICE — CLOSURE SKIN FACILITATES COMPATIBILITY W/ CERTAIN IS DSG

## (undated) DEVICE — KIT TRK KNEE PROC VIZADISC

## (undated) DEVICE — OUTFLOW CASSETTE TUBING, DO NOT USE IF PACKAGE IS DAMAGED: Brand: CROSSFLOW

## (undated) DEVICE — ABDOMINAL PAD: Brand: DERMACEA

## (undated) DEVICE — SUT ETHLN 3-0 18IN PS1 BLK --

## (undated) DEVICE — SOLUTION IRRIG 3000ML 0.9% SOD CHL USP UROMATIC PLAS CONT

## (undated) DEVICE — 2.3MM ICONIX DISPOSABLE DRILL: Brand: ICONIX

## (undated) DEVICE — KENDALL SCD EXPRESS SLEEVES, KNEE LENGTH, MEDIUM: Brand: KENDALL SCD

## (undated) DEVICE — SUTURE VCRL SZ 1 L36IN ABSRB UD CTX L48MM 1/2 CIR J977H

## (undated) DEVICE — SURGICAL PROCEDURE PACK BASIC ST FRANCIS

## (undated) DEVICE — YANKAUER,FLEXIBLE HANDLE,REGLR CAPACITY: Brand: MEDLINE INDUSTRIES, INC.

## (undated) DEVICE — STERILE PRESSURE PROTECTOR PAD® FOR DE MAYO UNIVERSAL DISTRACTOR® (10/CASE): Brand: DE MAYO UNIVERSAL DISTRACTOR®

## (undated) DEVICE — BIPOLAR SEALER 23-112-1 AQM 6.0: Brand: AQUAMANTYS ®

## (undated) DEVICE — SUTURE VCRL SZ 2-0 L27IN ABSRB UD L36MM CP-1 1/2 CIR REV J266H

## (undated) DEVICE — SOLUTION IRRIG 1000ML STRL H2O USP PLAS POUR BTL

## (undated) DEVICE — CLOSURE SKIN FLX NONINVASIVE PRELOC TECHNOLOGY FOR 24IN

## (undated) DEVICE — (D)PREP SKN CHLRAPRP APPL 26ML -- CONVERT TO ITEM 371833

## (undated) DEVICE — DRESSING HYDROFIBER AQUACEL AG ADVANTAGE 3.5X14 IN

## (undated) DEVICE — SOLUTION IV 250ML 0.9% SOD CHL PH 5 INJ USP VIAFLX PLAS

## (undated) DEVICE — PIN BNE FIX TEMP L140MM DIA4MM MAKO

## (undated) DEVICE — SYRINGE MED 50ML LUERLOCK TIP

## (undated) DEVICE — [AUGER BUR, ARTHROSCOPIC SHAVER BLADE,  DO NOT RESTERILIZE,  DO NOT USE IF PACKAGE IS DAMAGED,  KEEP DRY,  KEEP AWAY FROM SUNLIGHT]: Brand: FORMULA

## (undated) DEVICE — SUTURE VCRL SZ 1 L27IN ABSRB UD L36MM CP-1 1/2 CIR REV CUT J268H

## (undated) DEVICE — SOLUTION IRRIG 3000ML 0.9% SOD CHL FLX CONT 0797208] ICU MEDICAL INC]

## (undated) DEVICE — INFLOW CASSETTE TUBING, DO NOT USE IF PACKAGE IS DAMAGED: Brand: CROSSFLOW

## (undated) DEVICE — AMD ANTIMICROBIAL GAUZE SPONGES,12 PLY USP TYPE VII, 0.2% POLYHEXAMETHYLENE BIGUANIDE HCI (PHMB): Brand: CURITY

## (undated) DEVICE — BLADE SAW OSCILLATING 85X19X2 MM ROBOTIC-ASSISTED VELYS

## (undated) DEVICE — KIT DRP FOR RIO ROBOTIC ARM ASST SYS

## (undated) DEVICE — RESTRAINT UNIVERSAL HEAD DISP --

## (undated) DEVICE — DRAPE EQUIP SATELLITE STN STRL VELYS

## (undated) DEVICE — BLADE SHV DIA4MM RED RESECT FOR GEN DEB REM OF PERIOST FR

## (undated) DEVICE — PIN DRL 4X125 MM ROBOTIC-ASSISTED SOLUTION ARRY VELYS

## (undated) DEVICE — SUTURE STRATAFIX SPRL SZ 1 L14IN ABSRB VLT L48CM CTX 1/2 SXPD2B405

## (undated) DEVICE — DUAL CUT SAGITTAL BLADE

## (undated) DEVICE — KIT CHECKPOINT MARKER SURGICAL TIBIAL KNEE STERILE LATEX IMPLANTABLE DISPOSABLE MAKO ROBOT